# Patient Record
Sex: FEMALE | Race: WHITE | NOT HISPANIC OR LATINO | Employment: OTHER | URBAN - METROPOLITAN AREA
[De-identification: names, ages, dates, MRNs, and addresses within clinical notes are randomized per-mention and may not be internally consistent; named-entity substitution may affect disease eponyms.]

---

## 2021-01-22 ENCOUNTER — OFFICE VISIT (OUTPATIENT)
Dept: OTOLARYNGOLOGY | Facility: CLINIC | Age: 61
End: 2021-01-22

## 2021-01-22 VITALS — WEIGHT: 244.06 LBS | DIASTOLIC BLOOD PRESSURE: 84 MMHG | SYSTOLIC BLOOD PRESSURE: 130 MMHG | HEART RATE: 74 BPM

## 2021-01-22 DIAGNOSIS — S08.112S: Primary | ICD-10-CM

## 2021-01-22 PROCEDURE — 99999 PR PBB SHADOW E&M-NEW PATIENT-LVL II: CPT | Mod: PBBFAC,,, | Performed by: OTOLARYNGOLOGY

## 2021-01-22 PROCEDURE — 99203 PR OFFICE/OUTPT VISIT, NEW, LEVL III, 30-44 MIN: ICD-10-PCS | Mod: S$PBB,,, | Performed by: OTOLARYNGOLOGY

## 2021-01-22 PROCEDURE — 99202 OFFICE O/P NEW SF 15 MIN: CPT | Mod: PBBFAC | Performed by: OTOLARYNGOLOGY

## 2021-01-22 PROCEDURE — 99203 OFFICE O/P NEW LOW 30 MIN: CPT | Mod: S$PBB,,, | Performed by: OTOLARYNGOLOGY

## 2021-01-22 PROCEDURE — 99999 PR PBB SHADOW E&M-NEW PATIENT-LVL II: ICD-10-PCS | Mod: PBBFAC,,, | Performed by: OTOLARYNGOLOGY

## 2023-05-22 ENCOUNTER — TELEPHONE (OUTPATIENT)
Dept: NEUROSURGERY | Facility: CLINIC | Age: 63
End: 2023-05-22
Payer: OTHER GOVERNMENT

## 2023-05-22 NOTE — TELEPHONE ENCOUNTER
Spoke with patient confirmed that we accept her insurance and patient has been scheduled with Dr. Walters. Patient has been informed that she needs [facility].

## 2023-05-22 NOTE — TELEPHONE ENCOUNTER
----- Message from Barb Vaca sent at 5/17/2023 11:58 AM CDT -----  Regarding: Need Call back  Hi, pt was told to contact you about her insurance from billing. Pt has a question because she would like to see Dr Walters. Pls call the pt at

## 2023-05-25 ENCOUNTER — TELEPHONE (OUTPATIENT)
Dept: NEUROSURGERY | Facility: CLINIC | Age: 63
End: 2023-05-25
Payer: OTHER GOVERNMENT

## 2023-05-25 DIAGNOSIS — M54.16 LUMBAR RADICULOPATHY: Primary | ICD-10-CM

## 2023-05-25 NOTE — TELEPHONE ENCOUNTER
Spoke to patient and confirmed r/s time and date for appointment with Anne Marie. She confirmed she will bring Xrays and MRIs on disc to appointment        ----- Message from Daysi Herrmann sent at 5/25/2023  2:04 PM CDT -----  Regarding: PATIENT REQUEST  Name of Who is Calling: RENETTA CHIANG [[id number]]      What is the request in detail: Pt has an appt scheduled for 5/30 and she is requesting to reschedule to 6/1/2023. Please advise!        Can the clinic reply by [facility]: NO        [facility] [phone]

## 2023-06-01 ENCOUNTER — OFFICE VISIT (OUTPATIENT)
Dept: NEUROSURGERY | Facility: CLINIC | Age: 63
End: 2023-06-01
Payer: OTHER GOVERNMENT

## 2023-06-01 ENCOUNTER — HOSPITAL ENCOUNTER (OUTPATIENT)
Dept: RADIOLOGY | Facility: HOSPITAL | Age: 63
Discharge: HOME | End: 2023-06-01
Attending: NURSE PRACTITIONER
Payer: OTHER GOVERNMENT

## 2023-06-01 VITALS — WEIGHT: 235.69 LBS | DIASTOLIC BLOOD PRESSURE: 80 MMHG | HEART RATE: 73 BPM | SYSTOLIC BLOOD PRESSURE: 120 MMHG

## 2023-06-01 DIAGNOSIS — M48.07 SPINAL STENOSIS, LUMBOSACRAL REGION: ICD-10-CM

## 2023-06-01 DIAGNOSIS — M54.16 LUMBAR RADICULOPATHY: ICD-10-CM

## 2023-06-01 DIAGNOSIS — M54.9 DORSALGIA, UNSPECIFIED: Primary | ICD-10-CM

## 2023-06-01 DIAGNOSIS — Z98.890 HX OF LAMINECTOMY: ICD-10-CM

## 2023-06-01 PROCEDURE — 72110 X-RAY EXAM L-2 SPINE 4/>VWS: CPT | Mod: TC

## 2023-06-01 PROCEDURE — 72110 XR LUMBAR SPINE AP AND LAT WITH FLEX/EXT: ICD-10-PCS | Mod: 26,,, | Performed by: RADIOLOGY

## 2023-06-01 PROCEDURE — 99204 PR OFFICE/OUTPT VISIT, NEW, LEVL IV, 45-59 MIN: ICD-10-PCS | Mod: S$PBB,,, | Performed by: NURSE PRACTITIONER

## 2023-06-01 PROCEDURE — 99204 OFFICE O/P NEW MOD 45 MIN: CPT | Mod: S$PBB,,, | Performed by: NURSE PRACTITIONER

## 2023-06-01 PROCEDURE — 72110 X-RAY EXAM L-2 SPINE 4/>VWS: CPT | Mod: 26,,, | Performed by: RADIOLOGY

## 2023-06-01 PROCEDURE — 99999 PR PBB SHADOW E&M-EST. PATIENT-LVL III: ICD-10-PCS | Mod: PBBFAC,,, | Performed by: NURSE PRACTITIONER

## 2023-06-01 PROCEDURE — 99213 OFFICE O/P EST LOW 20 MIN: CPT | Mod: PBBFAC | Performed by: NURSE PRACTITIONER

## 2023-06-01 PROCEDURE — 99999 PR PBB SHADOW E&M-EST. PATIENT-LVL III: CPT | Mod: PBBFAC,,, | Performed by: NURSE PRACTITIONER
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
## 2023-07-31 ENCOUNTER — TELEPHONE (OUTPATIENT)
Dept: NEUROSURGERY | Facility: CLINIC | Age: 63
End: 2023-07-31
Payer: OTHER GOVERNMENT

## 2023-07-31 NOTE — TELEPHONE ENCOUNTER
Spoke to patient and confirmed rescheduled date and time for imaging and follow up appointment with Dr. Walters          ----- Message from Radha Patel sent at 7/31/2023  3:51 PM CDT -----  Type:  Appointment Request    Name of Caller:RENETTA CHIANG [[id number]]  When is the first available appointment?No access  Symptoms: reschedule   Would the patient rather a call back or a response via [facility]? Call   Best Call Back Number:[phone]  Additional Information: Patient indicates she needs to reschedule all her appointments for 8/1/23. Patient indicates she will not be able to make the appointment for 8/1/23. Patient indicates she would like to schedule for the next appointment available what the provider. Please call patient back with further assistance in scheduling labs and follow up appointment. Please call back with further information.

## 2023-08-11 DIAGNOSIS — M25.552 BILATERAL HIP PAIN: Primary | ICD-10-CM

## 2023-08-11 DIAGNOSIS — M25.551 BILATERAL HIP PAIN: Primary | ICD-10-CM

## 2023-08-14 ENCOUNTER — HOSPITAL ENCOUNTER (OUTPATIENT)
Dept: RADIOLOGY | Facility: HOSPITAL | Age: 63
Discharge: HOME | End: 2023-08-14
Attending: ORTHOPAEDIC SURGERY
Payer: OTHER GOVERNMENT

## 2023-08-14 ENCOUNTER — TELEPHONE (OUTPATIENT)
Dept: ORTHOPEDICS | Facility: CLINIC | Age: 63
End: 2023-08-14

## 2023-08-14 ENCOUNTER — OFFICE VISIT (OUTPATIENT)
Dept: ORTHOPEDICS | Facility: CLINIC | Age: 63
End: 2023-08-14
Payer: OTHER GOVERNMENT

## 2023-08-14 VITALS — BODY MASS INDEX: 36.89 KG/M2 | HEIGHT: 68 IN | WEIGHT: 243.38 LBS

## 2023-08-14 DIAGNOSIS — Z96.642 PRESENCE OF LEFT ARTIFICIAL HIP JOINT: ICD-10-CM

## 2023-08-14 DIAGNOSIS — M25.552 PAIN OF LEFT HIP: Primary | ICD-10-CM

## 2023-08-14 DIAGNOSIS — Z96.642 STATUS POST LEFT HIP REPLACEMENT: ICD-10-CM

## 2023-08-14 DIAGNOSIS — M25.552 BILATERAL HIP PAIN: ICD-10-CM

## 2023-08-14 DIAGNOSIS — M25.551 BILATERAL HIP PAIN: ICD-10-CM

## 2023-08-14 PROCEDURE — 99213 OFFICE O/P EST LOW 20 MIN: CPT | Mod: PBBFAC | Performed by: ORTHOPAEDIC SURGERY

## 2023-08-14 PROCEDURE — 73521 XR HIPS BILATERAL 2 VIEW INCL AP PELVIS: ICD-10-PCS | Mod: 26,,, | Performed by: RADIOLOGY

## 2023-08-14 PROCEDURE — 99204 OFFICE O/P NEW MOD 45 MIN: CPT | Mod: S$PBB,,, | Performed by: ORTHOPAEDIC SURGERY

## 2023-08-14 PROCEDURE — 99999 PR PBB SHADOW E&M-EST. PATIENT-LVL III: ICD-10-PCS | Mod: PBBFAC,,, | Performed by: ORTHOPAEDIC SURGERY

## 2023-08-14 PROCEDURE — 73521 X-RAY EXAM HIPS BI 2 VIEWS: CPT | Mod: 26,,, | Performed by: RADIOLOGY

## 2023-08-14 PROCEDURE — 73521 X-RAY EXAM HIPS BI 2 VIEWS: CPT | Mod: TC

## 2023-08-14 PROCEDURE — 99999 PR PBB SHADOW E&M-EST. PATIENT-LVL III: CPT | Mod: PBBFAC,,, | Performed by: ORTHOPAEDIC SURGERY

## 2023-08-14 PROCEDURE — 99204 PR OFFICE/OUTPT VISIT, NEW, LEVL IV, 45-59 MIN: ICD-10-PCS | Mod: S$PBB,,, | Performed by: ORTHOPAEDIC SURGERY

## 2023-08-14 RX ORDER — LEVETIRACETAM 750 MG/1
500 TABLET ORAL 2 TIMES DAILY
COMMUNITY
End: 2024-02-08

## 2023-08-14 RX ORDER — BACLOFEN 10 MG/1
10 TABLET ORAL 3 TIMES DAILY
COMMUNITY

## 2023-08-14 RX ORDER — PREGABALIN 150 MG/1
150 CAPSULE ORAL 2 TIMES DAILY
COMMUNITY

## 2023-08-14 RX ORDER — VENLAFAXINE HYDROCHLORIDE 75 MG/1
75 CAPSULE, EXTENDED RELEASE ORAL DAILY
Status: ON HOLD | COMMUNITY
End: 2023-10-24 | Stop reason: HOSPADM

## 2023-08-14 RX ORDER — PANTOPRAZOLE SODIUM 20 MG/1
20 TABLET, DELAYED RELEASE ORAL DAILY
COMMUNITY

## 2023-08-14 RX ORDER — FERROUS SULFATE, DRIED 160(50) MG
1 TABLET, EXTENDED RELEASE ORAL 2 TIMES DAILY WITH MEALS
Status: ON HOLD | COMMUNITY
End: 2023-10-24 | Stop reason: HOSPADM

## 2023-08-14 RX ORDER — MELATONIN 10 MG
CAPSULE ORAL NIGHTLY
COMMUNITY

## 2023-08-14 RX ORDER — CARBOXYMETHYLCELLULOSE SODIUM 5 MG/ML
1 SOLUTION/ DROPS OPHTHALMIC 3 TIMES DAILY PRN
COMMUNITY

## 2023-08-14 RX ORDER — FERROUS GLUCONATE 324(38)MG
324 TABLET ORAL
COMMUNITY

## 2023-08-14 RX ORDER — IBUPROFEN 200 MG
200 TABLET ORAL EVERY 6 HOURS PRN
Status: ON HOLD | COMMUNITY
End: 2023-10-24 | Stop reason: HOSPADM

## 2023-08-14 RX ORDER — LEVOCETIRIZINE DIHYDROCHLORIDE 5 MG/1
5 TABLET, FILM COATED ORAL NIGHTLY
COMMUNITY

## 2023-08-14 RX ORDER — UBIDECARENONE 75 MG
500 CAPSULE ORAL DAILY
Status: ON HOLD | COMMUNITY
End: 2023-10-24 | Stop reason: HOSPADM

## 2023-08-14 RX ORDER — ZINC GLUCONATE 50 MG
50 TABLET ORAL DAILY
Status: ON HOLD | COMMUNITY
End: 2023-10-24 | Stop reason: HOSPADM

## 2023-08-14 RX ORDER — LANOLIN ALCOHOL/MO/W.PET/CERES
400 CREAM (GRAM) TOPICAL DAILY
COMMUNITY

## 2023-08-14 RX ORDER — ERGOCALCIFEROL 1.25 MG/1
50000 CAPSULE ORAL
Status: ON HOLD | COMMUNITY
End: 2023-10-24 | Stop reason: HOSPADM

## 2023-08-14 NOTE — TELEPHONE ENCOUNTER
Called patient and results given. Patient verbalized understanding.    ----- Message from João Mckeon MD sent at 8/14/2023  3:44 PM CDT -----  Please call pt.  No infection.  We will call her after her MRI

## 2023-08-14 NOTE — PROGRESS NOTES
Subjective:      Patient ID: Fior Dc is a 62 y.o. female.    Chief Complaint: Pain of the Left Hip    HPI  Fior Dc is a 62 year old female here with a 5 year history of left hip pain.  Bilateral hip replacements in [facility].  The right was in 2018 the left was in 2016.  She initially did well with the left hip started having problems a few years later.  She also has significant lumbar disease for which he is being treated.  She has an upcoming appointment with Dr. Walters for that.  The patient is on disability. She fell three weeks ago.   The pain is moderate The pain is located in the lateral.  There is is radiation.  The pain radaites to the lateral thigh.  The pain is described as throbbing. It is aggravated by sitting, standing, and walking.  It  is alleviated by rest. There is numbness or tingling of the lower extremity.  There is back pain.  She  has not tried medications or injections recently.   She does have difficulty getting in or out of a car, getting dressed, or going up or down stairs.  The patient does use an assistive device.    Past Medical History:   Diagnosis Date    Arthritis     Bulging lumbar disc     Cancer     Osteoarthritis      Past Surgical History:   Procedure Laterality Date    ABDOMINOPLASTY      APPENDECTOMY      BACK SURGERY      breast aug Bilateral     CHOLECYSTECTOMY      FOOT SURGERY      GANGLION CYST EXCISION      GASTRIC BYPASS      HIP SURGERY      HYSTERECTOMY      KNEE SURGERY      SHOULDER SURGERY       Family History   Problem Relation Age of Onset    Hypertension Mother     Hypertension Father     Diabetes Father     Cancer Father      Social History     Socioeconomic History    Marital status: Single   Tobacco Use    Smoking status: Every Day     Current packs/day: 0.50     Average packs/day: 0.5 packs/day for 0.3 years (0.1 ttl pk-yrs)     Types: Vaping with nicotine, Cigarettes     Start date: 4/1/2023    Smokeless tobacco:  Never   Substance and Sexual Activity    Alcohol use: Yes    Drug use: Never     Current Outpatient Medications on File Prior to Visit   Medication Sig Dispense Refill    baclofen (LIORESAL) 10 MG tablet Take 10 mg by mouth 3 (three) times daily.      calcium-vitamin D3 (CALCIUM 500 + D) 500 mg-5 mcg (200 unit) per tablet Take 1 tablet by mouth 2 (two) times daily with meals.      carboxymethylcellulose (REFRESH PLUS) 0.5 % Dpet 1 drop 3 (three) times daily as needed.      cyanocobalamin 500 MCG tablet Take 500 mcg by mouth once daily.      ergocalciferol (VITAMIN D2) 50,000 unit Cap Take 50,000 Units by mouth every 7 days.      ferrous gluconate (FERGON) 324 MG tablet Take 324 mg by mouth daily with breakfast.      ibuprofen (ADVIL,MOTRIN) 200 MG tablet Take 200 mg by mouth every 6 (six) hours as needed for Pain.      levETIRAcetam (KEPPRA) 750 MG Tab Take 500 mg by mouth 2 (two) times daily.      levocetirizine (XYZAL) 5 MG tablet Take 5 mg by mouth every evening.      magnesium oxide (MAG-OX) 400 mg (241.3 mg magnesium) tablet Take 400 mg by mouth once daily.      melatonin 10 mg Cap Take by mouth.      pantoprazole (PROTONIX) 20 MG tablet Take 20 mg by mouth once daily.      pregabalin (LYRICA) 150 MG capsule Take 150 mg by mouth 2 (two) times daily.      venlafaxine (EFFEXOR-XR) 75 MG 24 hr capsule Take 75 mg by mouth once daily.      zinc gluconate 50 mg tablet Take 50 mg by mouth once daily.       No current facility-administered medications on file prior to visit.     Review of patient's allergies indicates:   Allergen Reactions    Adhesive tape-silicones     Codeine     Flunisolide     Gabapentin     Hydrocodone     Oxycodone        Review of Systems   Constitutional: Negative for chills, fever and night sweats.   HENT:  Negative for hearing loss.    Eyes:  Negative for blurred vision and double vision.   Cardiovascular:  Negative for chest pain, claudication and leg swelling.   Respiratory:  Negative for  "shortness of breath.    Endocrine: Negative for polydipsia, polyphagia and polyuria.   Hematologic/Lymphatic: Negative for adenopathy and bleeding problem. Does not bruise/bleed easily.   Skin:  Negative for poor wound healing.   Gastrointestinal:  Negative for diarrhea and heartburn.   Genitourinary:  Negative for bladder incontinence.   Neurological:  Negative for focal weakness, headaches, numbness, paresthesias and sensory change.   Psychiatric/Behavioral:  The patient is not nervous/anxious.    Allergic/Immunologic: Negative for persistent infections.         Objective:      Body mass index is 37.01 kg/m².  Vitals:    08/14/23 1332   Weight: 110.4 kg (243 lb 6.2 oz)   Height: 5' 8" (1.727 m)         General    Constitutional: She is oriented to person, place, and time. She appears well-developed and well-nourished.   HENT:   Head: Normocephalic and atraumatic.   Eyes: EOM are normal.   Cardiovascular:  Normal rate.            Pulmonary/Chest: Effort normal.   Neurological: She is alert and oriented to person, place, and time.   Psychiatric: She has a normal mood and affect. Her behavior is normal.     General Musculoskeletal Exam   Gait: normal   Pelvic Obliquity: none      Right Knee Exam     Inspection   Alignment:  normal  Effusion: absent    Left Knee Exam     Inspection   Alignment:  normal  Effusion: absent    Right Hip Exam     Inspection   Scars: present (anterior)  Swelling: absent  Bruising: absent  No deformity of hip.  Quadriceps Atrophy:  Negative  Erythema: absent    Range of Motion   Abduction:  25   Adduction:  20   Extension:  0   Flexion:  100   External rotation:  30   Internal rotation:  25     Tests   Pain w/ forced internal rotation (GARTH): absent  Stinchfield test: negative    Other   Sensation: normal  Left Hip Exam     Inspection   Scars: present (anterior)  Swelling: absent  No deformity of hip.  Quadriceps Atrophy:  negative  Erythema: absent  Bruising: absent    Tenderness   The " patient tender to palpation of the psoas tendon.    Range of Motion   Abduction:  25   Adduction:  20   Extension:  0   Flexion:  100   External rotation:  30   Internal rotation: 25     Tests   Pain w/ forced internal rotation (GARTH): absent  Stinchfield test: negative    Other   Sensation: normal      Back (L-Spine & T-Spine) / Neck (C-Spine) Exam   Back exam is normal.      Muscle Strength   Right Lower Extremity   Hip Abduction: 5/5   Hip Adduction: 5/5   Hip Flexion: 5/5   Ankle Dorsiflexion:  5/5   Left Lower Extremity   Hip Abduction: 5/5   Hip Adduction: 5/5   Hip Flexion: 5/5   Ankle Dorsiflexion:  5/5     Reflexes     Left Side  Quadriceps:  2+    Right Side   Quadriceps:  2+    Vascular Exam     Right Pulses  Dorsalis Pedis:      2+          Left Pulses  Dorsalis Pedis:      2+          Capillary Refill  Right Hand: normal capillary refill  Left Hand: normal capillary refill        Edema  Right Upper Leg: absent  Left Upper Leg: absent      Radiographs taken today were reviewed by me.  There is a prosthetic replacement of the bilateral hip(s).  The prostheses is well positioned.  There is not evidence of bone loss, osteolysis, or loosening. There is not a fracture.            Assessment:       Encounter Diagnoses   Name Primary?    Pain of left hip Yes    Status post left hip replacement     Presence of left artificial hip joint           Plan:       Fior was seen today for pain.    Diagnoses and all orders for this visit:    Pain of left hip  -     C-Reactive Protein; Future  -     Sedimentation rate; Future    Status post left hip replacement  -     C-Reactive Protein; Future  -     Sedimentation rate; Future    Presence of left artificial hip joint  -     C-Reactive Protein; Future  -     Sedimentation rate; Future  -     MRI Hip Without Contrast Left; Future        Options discussed.  ESR/CRP.  MARS MRI LEFT HIP

## 2023-08-28 ENCOUNTER — TELEPHONE (OUTPATIENT)
Dept: NEUROSURGERY | Facility: CLINIC | Age: 63
End: 2023-08-28
Payer: OTHER GOVERNMENT

## 2023-08-28 NOTE — TELEPHONE ENCOUNTER
----- Message from Patricia Daley sent at 8/28/2023  8:26 AM CDT -----  Regarding: Pt called to cancel/reschedule her 8/29/23 appts and would like a call back to r/s  Appointment Access Request:    Pt called to cancel/reschedule her 8/29/23 appts and would like a call back to r/s    Pt can be reached at [phone]

## 2023-09-19 ENCOUNTER — OFFICE VISIT (OUTPATIENT)
Dept: NEUROSURGERY | Facility: CLINIC | Age: 63
End: 2023-09-19
Payer: OTHER GOVERNMENT

## 2023-09-19 DIAGNOSIS — Z98.890 HX OF LAMINECTOMY: ICD-10-CM

## 2023-09-19 DIAGNOSIS — M54.16 LUMBAR RADICULOPATHY: Primary | ICD-10-CM

## 2023-09-19 PROCEDURE — 99214 PR OFFICE/OUTPT VISIT, EST, LEVL IV, 30-39 MIN: ICD-10-PCS | Mod: 95,,, | Performed by: NEUROLOGICAL SURGERY

## 2023-09-19 PROCEDURE — 99214 OFFICE O/P EST MOD 30 MIN: CPT | Mod: 95,,, | Performed by: NEUROLOGICAL SURGERY

## 2023-09-28 DIAGNOSIS — M48.061 SPINAL STENOSIS OF LUMBAR REGION, UNSPECIFIED WHETHER NEUROGENIC CLAUDICATION PRESENT: Primary | ICD-10-CM

## 2023-09-28 DIAGNOSIS — M48.07 SPINAL STENOSIS, LUMBOSACRAL REGION: ICD-10-CM
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
## 2023-10-03 ENCOUNTER — PATIENT MESSAGE (OUTPATIENT)
Dept: NEUROSURGERY | Facility: CLINIC | Age: 63
End: 2023-10-03
Payer: OTHER GOVERNMENT

## 2023-10-04 ENCOUNTER — TELEPHONE (OUTPATIENT)
Dept: PREADMISSION TESTING | Facility: HOSPITAL | Age: 63
End: 2023-10-04
Payer: OTHER GOVERNMENT

## 2023-10-04 DIAGNOSIS — Z01.818 PREOPERATIVE TESTING: Primary | ICD-10-CM

## 2023-10-04 NOTE — PRE-PROCEDURE INSTRUCTIONS
Patient stated has not had any problem with anesthesia in the past. Will need labs and ua.  Our  will call to set up these appts.    Preop instructions given. Hold aspirin, aspirin containing products, nsaids(aleve, advil, motrin, ibuprofen, naprosyn, naproxen, voltaren, diclofenac), vitamins( Calcium with Vitamin D, cyanocobalamin, D2) and supplements( Collagen, Zinc) one week prior to surgery.     May take Tylenol.  Medication instructions given:  baclofen (LIORESAL) 10 MG tablet -- --  --   Sig: Take 10 mg by mouth 3 (three) times daily.   Class: Historical Med   Route: Oral       Baylee Cerrato RN 10/4/2023 10:02 AM   TAKE IN AM OF SURGERY.              calcium-vitamin D3 (CALCIUM 500 + D) 500 mg-5 mcg (200 unit) per tablet -- --  --   Sig: Take 1 tablet by mouth 2 (two) times daily with meals.   Class: Historical Med   Route: Baylee Waters RN 10/4/2023 10:01 AM   HOLD ONE WEEK PRIOR TO SURGERY.             carboxymethylcellulose (REFRESH PLUS) 0.5 % Dpet -- --  --   Si drop 3 (three) times daily as needed.   Class: Historical Med       Baylee Cerrato RN 10/4/2023 10:03 AM   TAKE IF NEEDED            collagen/biotin/ascorbic acid (COLLAGEN 1500 PLUS C ORAL) -- --  --   Sig: Take by mouth Daily.   Class: Historical Med   Route: Baylee Waters RN 10/4/2023 10:07 AM   HOLD ONE WEEK PRIOR TO SURGERY.             cyanocobalamin 500 MCG tablet -- --  --   Sig: Take 500 mcg by mouth once daily.   Class: Historical Med   Route: Oral       Baylee Cerrato RN 10/4/2023 10:01 AM   HOLD ONE WEEK PRIOR TO SURGERY.             ergocalciferol (VITAMIN D2) 50,000 unit Cap -- --  --   Sig: Take 50,000 Units by mouth every 7 days.   Class: Historical Med   Route: Baylee Waters RN 10/4/2023 10:01 AM   HOLD ONE WEEK PRIOR TO SURGERY.             ferrous gluconate (FERGON) 324 MG tablet -- --  --   Sig: Take 324 mg by mouth daily with breakfast.   Class: Historical Med   Route: Oral        Baylee Cerrato RN 10/4/2023 10:03 AM   HOLD IN AM OF SURGERY.             ibuprofen (ADVIL,MOTRIN) 200 MG tablet -- --  --   Sig: Take 200 mg by mouth every 6 (six) hours as needed for Pain.   Class: Historical Med   Route: Baylee Waters RN 10/4/2023 10:01 AM   HOLD ONE WEEK PRIOR TO SURGERY.             levETIRAcetam (KEPPRA) 750 MG Tab -- --  --   Sig: Take 500 mg by mouth 2 (two) times daily.   Class: Historical Med   Route: Baylee Waters RN 10/4/2023 10:03 AM   TAKE IN AM OF SURGERY.              levocetirizine (XYZAL) 5 MG tablet -- --  --   Sig: Take 5 mg by mouth every evening.   Class: Historical Med   Route: Baylee Waters RN 10/4/2023 10:03 AM   TAKE THE NIGHT BEFORE SURGERY.              magnesium oxide (MAG-OX) 400 mg (241.3 mg magnesium) tablet -- --  --   Sig: Take 400 mg by mouth once daily.   Class: Historical Med   Route: Baylee Waters RN 10/4/2023 10:02 AM   HOLD IN AM OF SURGERY.             melatonin 10 mg Cap -- --  --   Sig: Take by mouth every evening.   Class: Historical Med   Route: Baylee Waters RN 10/4/2023 10:04 AM   TAKE THE NIGHT BEFORE SURGERY.              pantoprazole (PROTONIX) 20 MG tablet -- --  --   Sig: Take 20 mg by mouth once daily.   Class: Historical Med   Route: Baylee Waters RN 10/4/2023 10:04 AM   TAKE IN AM OF SURGERY.              pregabalin (LYRICA) 150 MG capsule -- --  --   Sig: Take 150 mg by mouth 2 (two) times daily.   Class: Historical Med   Route: Baylee Waters RN 10/4/2023 10:04 AM   TAKE IN AM OF SURGERY.              venlafaxine (EFFEXOR-XR) 75 MG 24 hr capsule -- --  --   Sig: Take 75 mg by mouth once daily.   Class: Historical Med   Route: Baylee Waters RN 10/4/2023 10:04 AM   TAKE IN AM OF SURGERY.              zinc gluconate 50 mg tablet -- --  --   Sig: Take 50 mg by mouth once daily.   Class: Historical Med   Route: Oral       Baylee Cerrato RN 10/4/2023 10:02 AM   HOLD  ONE WEEK PRIOR TO SURGERY            Your surgery has been scheduled for:_Monday 10/23/2023_________________________________________  [facility] ( [facility]) [phone]  You should report to:  ____[facility], located on the [place] side of the first floor of the           [facility] ([phone])  _x___The Second Floor [facility] Center, located on the [facility] side of the            Second floor of the [facility] ([phone])  ____3rd Floor [facility] located on the [facility] side of the [facility] [phone]  Please Note   Tell your doctor if you take Aspirin, products containing Aspirin, herbal medications  or blood thinners, such as Coumadin, Ticlid, or Plavix.  (Consult your provider regarding holding or stopping before surgery).  Arrange for someone to drive you home following surgery.  You will not be allowed to leave the surgical facility alone or drive yourself home following sedation and anesthesia.  Before Surgery  Stop taking all vitamins/ herbal medications 14days prior to surgery  No Motrin/Advil (Ibuprofen) 7 days before surgery  No Aleve (Naproxen) 7 days before surgery  Stop Taking Asprin, products containing Asprin _7____days before surgery  Stop taking blood thinners_______days before surgery  No Goody's/BC  Powder 7 days before surgery  Refrain from drinking alcoholic beverages for 24hours before and after surgery  Stop or limit smoking / vaping____7_____days before surgery. No vaping the day of surgery.  You may take Tylenol for pain  Night before Surgery  Nothing to eat or drink after midnight.  Take a shower or bath (shower is recommended).  Bathe with Hibiclens soap or an antibacterial soap from the neck down.  If not supplied by your surgeon, hibiclens soap will need to be purchased over the counter in pharmacy.  Rinse soap off thoroughly.  Shampoo your hair with your regular shampoo  The Day of  Surgery  NOTHING TO  DRINK 2 hours before arrival time. If you are told to take medication on the morning of surgery, it may be taken with a sip of water.   Take another bath or shower with hibiclens or any antibacterial soap, to reduce the chance of infection.  Take heart and blood pressure medications with a small sip of water, as advised by the perioperative team.  Do not take fluid pills  You may brush your teeth and rinse your mouth, but do not swall any additional water.   Do not apply perfumes, powder, body lotions or deodorant on the day of surgery.  Nail polish should be removed.  Do not wear makeup or moisturizer  Wear comfortable clothes, such as a button front shirt and loose fitting pants.  Leave all jewelry, including body piercings, and valuables at home.    Bring any devices you will neeed after surgery such as crutches or canes.  If you have sleep apnea, please bring your CPAP machine  In the event that your physical condition changes including the onset of a cold or respiratory illness, or if you have to delay or cancel your surgery, please notify your surgeon.    Directions given to the surgery center. Verbalizes understanding. Also sent preop and med instructions to [facility] portal.
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
## 2023-10-04 NOTE — TELEPHONE ENCOUNTER
----- Message from Baylee Cerrato RN sent at 10/4/2023 10:32 AM CDT -----  Surgery 10/23  Please schedule labs and ua.  Thanks!

## 2023-10-09 ENCOUNTER — LAB VISIT (OUTPATIENT)
Dept: LAB | Facility: HOSPITAL | Age: 63
End: 2023-10-09
Attending: ANESTHESIOLOGY
Payer: OTHER GOVERNMENT

## 2023-10-09 DIAGNOSIS — Z01.818 PREOPERATIVE TESTING: ICD-10-CM

## 2023-10-09 LAB
ABO + RH BLD: NORMAL
ANION GAP SERPL CALC-SCNC: 10 MMOL/L (ref 8–16)
APTT PPP: 25.9 SEC (ref 21–32)
BLD GP AB SCN CELLS X3 SERPL QL: NORMAL
BUN SERPL-MCNC: 11 MG/DL (ref 8–23)
CALCIUM SERPL-MCNC: 9.4 MG/DL (ref 8.7–10.5)
CHLORIDE SERPL-SCNC: 103 MMOL/L (ref 95–110)
CO2 SERPL-SCNC: 25 MMOL/L (ref 23–29)
CREAT SERPL-MCNC: 0.8 MG/DL (ref 0.5–1.4)
ERYTHROCYTE [DISTWIDTH] IN BLOOD BY AUTOMATED COUNT: 14.7 % (ref 11.5–14.5)
EST. GFR  (NO RACE VARIABLE): >60 ML/MIN/1.73 M^2
GLUCOSE SERPL-MCNC: 81 MG/DL (ref 70–110)
HCT VFR BLD AUTO: 42.7 % (ref 37–48.5)
HGB BLD-MCNC: 13.4 G/DL (ref 12–16)
INR PPP: 1 (ref 0.8–1.2)
MCH RBC QN AUTO: 29.5 PG (ref 27–31)
MCHC RBC AUTO-ENTMCNC: 31.4 G/DL (ref 32–36)
MCV RBC AUTO: 94 FL (ref 82–98)
PLATELET # BLD AUTO: 250 K/UL (ref 150–450)
PMV BLD AUTO: 11.8 FL (ref 9.2–12.9)
POTASSIUM SERPL-SCNC: 4.4 MMOL/L (ref 3.5–5.1)
PROTHROMBIN TIME: 10.3 SEC (ref 9–12.5)
RBC # BLD AUTO: 4.54 M/UL (ref 4–5.4)
SODIUM SERPL-SCNC: 138 MMOL/L (ref 136–145)
SPECIMEN OUTDATE: NORMAL
WBC # BLD AUTO: 20.94 K/UL (ref 3.9–12.7)

## 2023-10-09 PROCEDURE — 86900 BLOOD TYPING SEROLOGIC ABO: CPT | Performed by: ANESTHESIOLOGY

## 2023-10-09 PROCEDURE — 85730 THROMBOPLASTIN TIME PARTIAL: CPT | Performed by: ANESTHESIOLOGY

## 2023-10-09 PROCEDURE — 80048 BASIC METABOLIC PNL TOTAL CA: CPT | Performed by: ANESTHESIOLOGY

## 2023-10-09 PROCEDURE — 85610 PROTHROMBIN TIME: CPT | Performed by: ANESTHESIOLOGY

## 2023-10-09 PROCEDURE — 36415 COLL VENOUS BLD VENIPUNCTURE: CPT | Performed by: ANESTHESIOLOGY

## 2023-10-09 PROCEDURE — 85027 COMPLETE CBC AUTOMATED: CPT | Performed by: ANESTHESIOLOGY

## 2023-10-18 ENCOUNTER — TELEPHONE (OUTPATIENT)
Dept: INTERVENTIONAL RADIOLOGY/VASCULAR | Facility: CLINIC | Age: 63
End: 2023-10-18
Payer: OTHER GOVERNMENT

## 2023-10-19 ENCOUNTER — PATIENT MESSAGE (OUTPATIENT)
Dept: NEUROSURGERY | Facility: CLINIC | Age: 63
End: 2023-10-19
Payer: OTHER GOVERNMENT

## 2023-10-20 ENCOUNTER — TELEPHONE (OUTPATIENT)
Dept: NEUROSURGERY | Facility: CLINIC | Age: 63
End: 2023-10-20
Payer: OTHER GOVERNMENT

## 2023-10-20 NOTE — TELEPHONE ENCOUNTER
Spoke to pt regarding message requesting call back.  She stated she had a question, but has since figured it out.  We confirmed time and location for sx arrival on 10/23/23

## 2023-10-21 ENCOUNTER — ANESTHESIA EVENT (OUTPATIENT)
Dept: SURGERY | Facility: HOSPITAL | Age: 63
DRG: 519 | End: 2023-10-21
Payer: OTHER GOVERNMENT

## 2023-10-23 ENCOUNTER — ANESTHESIA (OUTPATIENT)
Dept: SURGERY | Facility: HOSPITAL | Age: 63
DRG: 519 | End: 2023-10-23
Payer: OTHER GOVERNMENT

## 2023-10-23 ENCOUNTER — HOSPITAL ENCOUNTER (INPATIENT)
Facility: HOSPITAL | Age: 63
LOS: 1 days | Discharge: HOME | DRG: 519 | End: 2023-10-24
Attending: NEUROLOGICAL SURGERY | Admitting: NEUROLOGICAL SURGERY
Payer: OTHER GOVERNMENT

## 2023-10-23 VITALS — RESPIRATION RATE: 16 BRPM

## 2023-10-23 DIAGNOSIS — M48.061 LUMBAR STENOSIS: ICD-10-CM

## 2023-10-23 PROBLEM — G40.909 SEIZURE DISORDER: Chronic | Status: ACTIVE | Noted: 2023-10-23

## 2023-10-23 PROBLEM — M51.36 DDD (DEGENERATIVE DISC DISEASE), LUMBAR: Status: ACTIVE | Noted: 2023-10-23

## 2023-10-23 PROBLEM — C91.10 CLL (CHRONIC LYMPHOCYTIC LEUKEMIA): Status: ACTIVE | Noted: 2023-10-23

## 2023-10-23 PROBLEM — F32.A DEPRESSION: Status: ACTIVE | Noted: 2023-10-23

## 2023-10-23 PROBLEM — C91.10 CLL (CHRONIC LYMPHOCYTIC LEUKEMIA): Chronic | Status: ACTIVE | Noted: 2023-10-23

## 2023-10-23 PROBLEM — F32.A DEPRESSION: Chronic | Status: ACTIVE | Noted: 2023-10-23

## 2023-10-23 PROBLEM — E66.09 CLASS 2 OBESITY DUE TO EXCESS CALORIES WITH BODY MASS INDEX (BMI) OF 37.0 TO 37.9 IN ADULT: Status: ACTIVE | Noted: 2023-10-23

## 2023-10-23 PROBLEM — E66.812 CLASS 2 OBESITY DUE TO EXCESS CALORIES WITH BODY MASS INDEX (BMI) OF 37.0 TO 37.9 IN ADULT: Status: ACTIVE | Noted: 2023-10-23

## 2023-10-23 PROBLEM — G40.909 SEIZURE DISORDER: Status: ACTIVE | Noted: 2023-10-23

## 2023-10-23 PROBLEM — M51.369 DDD (DEGENERATIVE DISC DISEASE), LUMBAR: Status: ACTIVE | Noted: 2023-10-23

## 2023-10-23 LAB
ABO + RH BLD: NORMAL
BLD GP AB SCN CELLS X3 SERPL QL: NORMAL
SPECIMEN OUTDATE: NORMAL

## 2023-10-23 PROCEDURE — 94761 N-INVAS EAR/PLS OXIMETRY MLT: CPT

## 2023-10-23 PROCEDURE — 71000015 HC POSTOP RECOV 1ST HR: Performed by: NEUROLOGICAL SURGERY

## 2023-10-23 PROCEDURE — 37000009 HC ANESTHESIA EA ADD 15 MINS: Performed by: NEUROLOGICAL SURGERY

## 2023-10-23 PROCEDURE — 25000003 PHARM REV CODE 250: Performed by: NEUROLOGICAL SURGERY

## 2023-10-23 PROCEDURE — 27201423 OPTIME MED/SURG SUP & DEVICES STERILE SUPPLY: Performed by: NEUROLOGICAL SURGERY

## 2023-10-23 PROCEDURE — 25000003 PHARM REV CODE 250: Performed by: NURSE ANESTHETIST, CERTIFIED REGISTERED

## 2023-10-23 PROCEDURE — D9220A PRA ANESTHESIA: Mod: ANES,,, | Performed by: SURGERY

## 2023-10-23 PROCEDURE — 86901 BLOOD TYPING SEROLOGIC RH(D): CPT | Performed by: NEUROLOGICAL SURGERY

## 2023-10-23 PROCEDURE — 37000008 HC ANESTHESIA 1ST 15 MINUTES: Performed by: NEUROLOGICAL SURGERY

## 2023-10-23 PROCEDURE — 63600175 PHARM REV CODE 636 W HCPCS: Performed by: NEUROLOGICAL SURGERY

## 2023-10-23 PROCEDURE — D9220A PRA ANESTHESIA: ICD-10-PCS | Mod: ANES,,, | Performed by: SURGERY

## 2023-10-23 PROCEDURE — 27000221 HC OXYGEN, UP TO 24 HOURS

## 2023-10-23 PROCEDURE — 36000710: Performed by: NEUROLOGICAL SURGERY

## 2023-10-23 PROCEDURE — 71000033 HC RECOVERY, INTIAL HOUR: Performed by: NEUROLOGICAL SURGERY

## 2023-10-23 PROCEDURE — 63047 LAM FACETEC & FORAMOT LUMBAR: CPT | Mod: 22,,, | Performed by: NEUROLOGICAL SURGERY

## 2023-10-23 PROCEDURE — 63600175 PHARM REV CODE 636 W HCPCS: Performed by: STUDENT IN AN ORGANIZED HEALTH CARE EDUCATION/TRAINING PROGRAM

## 2023-10-23 PROCEDURE — 11000001 HC ACUTE MED/SURG PRIVATE ROOM

## 2023-10-23 PROCEDURE — D9220A PRA ANESTHESIA: Mod: CRNA,,, | Performed by: NURSE ANESTHETIST, CERTIFIED REGISTERED

## 2023-10-23 PROCEDURE — 63600175 PHARM REV CODE 636 W HCPCS: Performed by: NURSE ANESTHETIST, CERTIFIED REGISTERED

## 2023-10-23 PROCEDURE — D9220A PRA ANESTHESIA: ICD-10-PCS | Mod: CRNA,,, | Performed by: NURSE ANESTHETIST, CERTIFIED REGISTERED

## 2023-10-23 PROCEDURE — 36000711: Performed by: NEUROLOGICAL SURGERY

## 2023-10-23 PROCEDURE — 25000003 PHARM REV CODE 250: Performed by: STUDENT IN AN ORGANIZED HEALTH CARE EDUCATION/TRAINING PROGRAM

## 2023-10-23 PROCEDURE — 63047 PR LAMINEC/FACETECT/FORAMIN,LUMBAR 1 SEG: ICD-10-PCS | Mod: 22,,, | Performed by: NEUROLOGICAL SURGERY

## 2023-10-23 PROCEDURE — 99900035 HC TECH TIME PER 15 MIN (STAT)

## 2023-10-23 RX ORDER — ENOXAPARIN SODIUM 100 MG/ML
40 INJECTION SUBCUTANEOUS EVERY 24 HOURS
Status: DISCONTINUED | OUTPATIENT
Start: 2023-10-24 | End: 2023-10-24 | Stop reason: HOSPADM

## 2023-10-23 RX ORDER — MUPIROCIN 20 MG/G
1 OINTMENT TOPICAL 2 TIMES DAILY
Status: DISCONTINUED | OUTPATIENT
Start: 2023-10-23 | End: 2023-10-23 | Stop reason: HOSPADM

## 2023-10-23 RX ORDER — DEXAMETHASONE SODIUM PHOSPHATE 4 MG/ML
INJECTION, SOLUTION INTRA-ARTICULAR; INTRALESIONAL; INTRAMUSCULAR; INTRAVENOUS; SOFT TISSUE
Status: DISCONTINUED | OUTPATIENT
Start: 2023-10-23 | End: 2023-10-23

## 2023-10-23 RX ORDER — PROPOFOL 10 MG/ML
VIAL (ML) INTRAVENOUS
Status: DISCONTINUED | OUTPATIENT
Start: 2023-10-23 | End: 2023-10-23

## 2023-10-23 RX ORDER — IBUPROFEN 200 MG
16 TABLET ORAL
Status: DISCONTINUED | OUTPATIENT
Start: 2023-10-23 | End: 2023-10-24 | Stop reason: HOSPADM

## 2023-10-23 RX ORDER — DIPHENHYDRAMINE HYDROCHLORIDE 50 MG/ML
12.5 INJECTION INTRAMUSCULAR; INTRAVENOUS EVERY 4 HOURS PRN
Status: DISCONTINUED | OUTPATIENT
Start: 2023-10-23 | End: 2023-10-24 | Stop reason: HOSPADM

## 2023-10-23 RX ORDER — METHYLPREDNISOLONE ACETATE 40 MG/ML
INJECTION, SUSPENSION INTRA-ARTICULAR; INTRALESIONAL; INTRAMUSCULAR; SOFT TISSUE
Status: DISCONTINUED | OUTPATIENT
Start: 2023-10-23 | End: 2023-10-23 | Stop reason: HOSPADM

## 2023-10-23 RX ORDER — HYDROMORPHONE HYDROCHLORIDE 1 MG/ML
0.2 INJECTION, SOLUTION INTRAMUSCULAR; INTRAVENOUS; SUBCUTANEOUS EVERY 5 MIN PRN
Status: DISCONTINUED | OUTPATIENT
Start: 2023-10-23 | End: 2023-10-23 | Stop reason: HOSPADM

## 2023-10-23 RX ORDER — CETIRIZINE HYDROCHLORIDE 10 MG/1
10 TABLET ORAL NIGHTLY
Status: DISCONTINUED | OUTPATIENT
Start: 2023-10-23 | End: 2023-10-24 | Stop reason: HOSPADM

## 2023-10-23 RX ORDER — SODIUM CHLORIDE 0.9 % (FLUSH) 0.9 %
10 SYRINGE (ML) INJECTION
Status: DISCONTINUED | OUTPATIENT
Start: 2023-10-23 | End: 2023-10-23 | Stop reason: HOSPADM

## 2023-10-23 RX ORDER — HYDROMORPHONE HYDROCHLORIDE 1 MG/ML
1 INJECTION, SOLUTION INTRAMUSCULAR; INTRAVENOUS; SUBCUTANEOUS EVERY 6 HOURS PRN
Status: DISCONTINUED | OUTPATIENT
Start: 2023-10-23 | End: 2023-10-24 | Stop reason: HOSPADM

## 2023-10-23 RX ORDER — ONDANSETRON 2 MG/ML
4 INJECTION INTRAMUSCULAR; INTRAVENOUS EVERY 6 HOURS PRN
Status: DISCONTINUED | OUTPATIENT
Start: 2023-10-23 | End: 2023-10-24 | Stop reason: HOSPADM

## 2023-10-23 RX ORDER — ACETAMINOPHEN 500 MG
500 TABLET ORAL EVERY 6 HOURS PRN
Status: ON HOLD | COMMUNITY
End: 2023-10-24 | Stop reason: HOSPADM

## 2023-10-23 RX ORDER — PREGABALIN 150 MG/1
150 CAPSULE ORAL 2 TIMES DAILY
Status: DISCONTINUED | OUTPATIENT
Start: 2023-10-23 | End: 2023-10-24 | Stop reason: HOSPADM

## 2023-10-23 RX ORDER — PANTOPRAZOLE SODIUM 20 MG/1
20 TABLET, DELAYED RELEASE ORAL DAILY
Status: DISCONTINUED | OUTPATIENT
Start: 2023-10-23 | End: 2023-10-24 | Stop reason: HOSPADM

## 2023-10-23 RX ORDER — EPHEDRINE SULFATE 50 MG/ML
INJECTION, SOLUTION INTRAVENOUS
Status: DISCONTINUED | OUTPATIENT
Start: 2023-10-23 | End: 2023-10-23

## 2023-10-23 RX ORDER — VENLAFAXINE HYDROCHLORIDE 75 MG/1
75 CAPSULE, EXTENDED RELEASE ORAL DAILY
Status: DISCONTINUED | OUTPATIENT
Start: 2023-10-23 | End: 2023-10-23

## 2023-10-23 RX ORDER — FENTANYL CITRATE 50 UG/ML
25 INJECTION, SOLUTION INTRAMUSCULAR; INTRAVENOUS EVERY 5 MIN PRN
Status: DISCONTINUED | OUTPATIENT
Start: 2023-10-23 | End: 2023-10-23 | Stop reason: HOSPADM

## 2023-10-23 RX ORDER — OXYCODONE HYDROCHLORIDE 5 MG/1
5 TABLET ORAL
Status: DISCONTINUED | OUTPATIENT
Start: 2023-10-23 | End: 2023-10-24 | Stop reason: HOSPADM

## 2023-10-23 RX ORDER — MIDAZOLAM HYDROCHLORIDE 1 MG/ML
INJECTION, SOLUTION INTRAMUSCULAR; INTRAVENOUS
Status: DISCONTINUED | OUTPATIENT
Start: 2023-10-23 | End: 2023-10-23

## 2023-10-23 RX ORDER — BACLOFEN 10 MG/1
10 TABLET ORAL 3 TIMES DAILY
Status: DISCONTINUED | OUTPATIENT
Start: 2023-10-23 | End: 2023-10-24 | Stop reason: HOSPADM

## 2023-10-23 RX ORDER — MUPIROCIN 20 MG/G
OINTMENT TOPICAL
Status: DISCONTINUED | OUTPATIENT
Start: 2023-10-23 | End: 2023-10-23 | Stop reason: HOSPADM

## 2023-10-23 RX ORDER — LEVETIRACETAM 500 MG/1
500 TABLET ORAL 2 TIMES DAILY
Status: DISCONTINUED | OUTPATIENT
Start: 2023-10-23 | End: 2023-10-24 | Stop reason: HOSPADM

## 2023-10-23 RX ORDER — CEFAZOLIN SODIUM 1 G/3ML
INJECTION, POWDER, FOR SOLUTION INTRAMUSCULAR; INTRAVENOUS
Status: DISCONTINUED | OUTPATIENT
Start: 2023-10-23 | End: 2023-10-23

## 2023-10-23 RX ORDER — PHENYLEPHRINE HYDROCHLORIDE 10 MG/ML
INJECTION INTRAVENOUS CONTINUOUS PRN
Status: DISCONTINUED | OUTPATIENT
Start: 2023-10-23 | End: 2023-10-23

## 2023-10-23 RX ORDER — GLUCAGON 1 MG
1 KIT INJECTION
Status: DISCONTINUED | OUTPATIENT
Start: 2023-10-23 | End: 2023-10-24 | Stop reason: HOSPADM

## 2023-10-23 RX ORDER — FENTANYL CITRATE 50 UG/ML
INJECTION, SOLUTION INTRAMUSCULAR; INTRAVENOUS
Status: DISCONTINUED | OUTPATIENT
Start: 2023-10-23 | End: 2023-10-23

## 2023-10-23 RX ORDER — KETAMINE HCL IN 0.9 % NACL 50 MG/5 ML
SYRINGE (ML) INTRAVENOUS
Status: DISCONTINUED | OUTPATIENT
Start: 2023-10-23 | End: 2023-10-23

## 2023-10-23 RX ORDER — LIDOCAINE HYDROCHLORIDE 20 MG/ML
INJECTION, SOLUTION EPIDURAL; INFILTRATION; INTRACAUDAL; PERINEURAL
Status: DISCONTINUED | OUTPATIENT
Start: 2023-10-23 | End: 2023-10-23

## 2023-10-23 RX ORDER — ROCURONIUM BROMIDE 10 MG/ML
INJECTION, SOLUTION INTRAVENOUS
Status: DISCONTINUED | OUTPATIENT
Start: 2023-10-23 | End: 2023-10-23

## 2023-10-23 RX ORDER — ACETAMINOPHEN 500 MG
1000 TABLET ORAL EVERY 8 HOURS
Status: DISCONTINUED | OUTPATIENT
Start: 2023-10-23 | End: 2023-10-24 | Stop reason: HOSPADM

## 2023-10-23 RX ORDER — REMIFENTANIL HYDROCHLORIDE 1 MG/ML
INJECTION, POWDER, LYOPHILIZED, FOR SOLUTION INTRAVENOUS CONTINUOUS PRN
Status: DISCONTINUED | OUTPATIENT
Start: 2023-10-23 | End: 2023-10-23

## 2023-10-23 RX ORDER — AMOXICILLIN 250 MG
2 CAPSULE ORAL 2 TIMES DAILY
Status: DISCONTINUED | OUTPATIENT
Start: 2023-10-23 | End: 2023-10-24 | Stop reason: HOSPADM

## 2023-10-23 RX ORDER — SODIUM CHLORIDE 9 MG/ML
INJECTION, SOLUTION INTRAVENOUS CONTINUOUS
Status: DISCONTINUED | OUTPATIENT
Start: 2023-10-23 | End: 2023-10-24 | Stop reason: HOSPADM

## 2023-10-23 RX ORDER — METHOCARBAMOL 500 MG/1
1000 TABLET, FILM COATED ORAL 3 TIMES DAILY
Status: DISCONTINUED | OUTPATIENT
Start: 2023-10-23 | End: 2023-10-23

## 2023-10-23 RX ORDER — LIDOCAINE HYDROCHLORIDE AND EPINEPHRINE 10; 10 MG/ML; UG/ML
INJECTION, SOLUTION INFILTRATION; PERINEURAL
Status: DISCONTINUED | OUTPATIENT
Start: 2023-10-23 | End: 2023-10-23 | Stop reason: HOSPADM

## 2023-10-23 RX ORDER — ONDANSETRON 2 MG/ML
INJECTION INTRAMUSCULAR; INTRAVENOUS
Status: DISCONTINUED | OUTPATIENT
Start: 2023-10-23 | End: 2023-10-23

## 2023-10-23 RX ORDER — TALC
6 POWDER (GRAM) TOPICAL NIGHTLY PRN
Status: DISCONTINUED | OUTPATIENT
Start: 2023-10-23 | End: 2023-10-24 | Stop reason: HOSPADM

## 2023-10-23 RX ORDER — LIDOCAINE HYDROCHLORIDE 10 MG/ML
1 INJECTION, SOLUTION EPIDURAL; INFILTRATION; INTRACAUDAL; PERINEURAL ONCE AS NEEDED
Status: COMPLETED | OUTPATIENT
Start: 2023-10-23 | End: 2023-10-23

## 2023-10-23 RX ORDER — POLYETHYLENE GLYCOL 3350 17 G/17G
17 POWDER, FOR SOLUTION ORAL DAILY
Status: DISCONTINUED | OUTPATIENT
Start: 2023-10-23 | End: 2023-10-24 | Stop reason: HOSPADM

## 2023-10-23 RX ORDER — PHENYLEPHRINE HCL IN 0.9% NACL 1 MG/10 ML
SYRINGE (ML) INTRAVENOUS
Status: DISCONTINUED | OUTPATIENT
Start: 2023-10-23 | End: 2023-10-23

## 2023-10-23 RX ORDER — IBUPROFEN 200 MG
24 TABLET ORAL
Status: DISCONTINUED | OUTPATIENT
Start: 2023-10-23 | End: 2023-10-24 | Stop reason: HOSPADM

## 2023-10-23 RX ADMIN — EPHEDRINE SULFATE 10 MG: 50 INJECTION INTRAVENOUS at 07:10

## 2023-10-23 RX ADMIN — ACETAMINOPHEN 1000 MG: 500 TABLET ORAL at 03:10

## 2023-10-23 RX ADMIN — MUPIROCIN: 20 OINTMENT TOPICAL at 06:10

## 2023-10-23 RX ADMIN — EPHEDRINE SULFATE 5 MG: 50 INJECTION INTRAVENOUS at 08:10

## 2023-10-23 RX ADMIN — CEFAZOLIN 2 G: 330 INJECTION, POWDER, FOR SOLUTION INTRAMUSCULAR; INTRAVENOUS at 07:10

## 2023-10-23 RX ADMIN — BACLOFEN 10 MG: 10 TABLET ORAL at 03:10

## 2023-10-23 RX ADMIN — EPHEDRINE SULFATE 10 MG: 50 INJECTION INTRAVENOUS at 08:10

## 2023-10-23 RX ADMIN — Medication 200 MCG: at 07:10

## 2023-10-23 RX ADMIN — EPHEDRINE SULFATE 10 MG: 50 INJECTION INTRAVENOUS at 09:10

## 2023-10-23 RX ADMIN — ONDANSETRON 4 MG: 2 INJECTION INTRAMUSCULAR; INTRAVENOUS at 09:10

## 2023-10-23 RX ADMIN — SODIUM CHLORIDE, SODIUM GLUCONATE, SODIUM ACETATE, POTASSIUM CHLORIDE, MAGNESIUM CHLORIDE, SODIUM PHOSPHATE, DIBASIC, AND POTASSIUM PHOSPHATE: .53; .5; .37; .037; .03; .012; .00082 INJECTION, SOLUTION INTRAVENOUS at 08:10

## 2023-10-23 RX ADMIN — HYDROMORPHONE HYDROCHLORIDE 1 MG: 1 INJECTION, SOLUTION INTRAMUSCULAR; INTRAVENOUS; SUBCUTANEOUS at 01:10

## 2023-10-23 RX ADMIN — LIDOCAINE HYDROCHLORIDE 0.2 MG: 10 INJECTION, SOLUTION EPIDURAL; INFILTRATION; INTRACAUDAL; PERINEURAL at 06:10

## 2023-10-23 RX ADMIN — BACLOFEN 10 MG: 10 TABLET ORAL at 08:10

## 2023-10-23 RX ADMIN — LEVETIRACETAM 500 MG: 500 TABLET, FILM COATED ORAL at 10:10

## 2023-10-23 RX ADMIN — LIDOCAINE HYDROCHLORIDE 100 MG: 20 INJECTION, SOLUTION EPIDURAL; INFILTRATION; INTRACAUDAL at 07:10

## 2023-10-23 RX ADMIN — SENNOSIDES AND DOCUSATE SODIUM 2 TABLET: 50; 8.6 TABLET ORAL at 08:10

## 2023-10-23 RX ADMIN — SUGAMMADEX 200 MG: 100 INJECTION, SOLUTION INTRAVENOUS at 09:10

## 2023-10-23 RX ADMIN — EPHEDRINE SULFATE 5 MG: 50 INJECTION INTRAVENOUS at 09:10

## 2023-10-23 RX ADMIN — Medication 6 MG: at 10:10

## 2023-10-23 RX ADMIN — Medication 20 MG: at 07:10

## 2023-10-23 RX ADMIN — PROPOFOL 120 MG: 10 INJECTION, EMULSION INTRAVENOUS at 07:10

## 2023-10-23 RX ADMIN — REMIFENTANIL HYDROCHLORIDE 0.1 MCG/KG/MIN: 5 INJECTION, POWDER, LYOPHILIZED, FOR SOLUTION INTRAVENOUS at 08:10

## 2023-10-23 RX ADMIN — ROCURONIUM BROMIDE 50 MG: 10 INJECTION INTRAVENOUS at 07:10

## 2023-10-23 RX ADMIN — SODIUM CHLORIDE: 0.9 INJECTION, SOLUTION INTRAVENOUS at 06:10

## 2023-10-23 RX ADMIN — CEFAZOLIN 2 G: 2 INJECTION, POWDER, FOR SOLUTION INTRAMUSCULAR; INTRAVENOUS at 11:10

## 2023-10-23 RX ADMIN — SENNOSIDES AND DOCUSATE SODIUM 2 TABLET: 50; 8.6 TABLET ORAL at 12:10

## 2023-10-23 RX ADMIN — OXYCODONE HYDROCHLORIDE 5 MG: 5 TABLET ORAL at 08:10

## 2023-10-23 RX ADMIN — MIDAZOLAM 2 MG: 1 INJECTION INTRAMUSCULAR; INTRAVENOUS at 06:10

## 2023-10-23 RX ADMIN — CETIRIZINE HYDROCHLORIDE 10 MG: 10 TABLET, FILM COATED ORAL at 08:10

## 2023-10-23 RX ADMIN — FENTANYL CITRATE 50 MCG: 50 INJECTION INTRAMUSCULAR; INTRAVENOUS at 07:10

## 2023-10-23 RX ADMIN — POLYETHYLENE GLYCOL 3350 17 G: 17 POWDER, FOR SOLUTION ORAL at 12:10

## 2023-10-23 RX ADMIN — HYDROMORPHONE HYDROCHLORIDE 1 MG: 1 INJECTION, SOLUTION INTRAMUSCULAR; INTRAVENOUS; SUBCUTANEOUS at 10:10

## 2023-10-23 RX ADMIN — DEXAMETHASONE SODIUM PHOSPHATE 4 MG: 4 INJECTION INTRA-ARTICULAR; INTRALESIONAL; INTRAMUSCULAR; INTRAVENOUS; SOFT TISSUE at 07:10

## 2023-10-23 RX ADMIN — CEFAZOLIN 2 G: 2 INJECTION, POWDER, FOR SOLUTION INTRAMUSCULAR; INTRAVENOUS at 03:10

## 2023-10-23 RX ADMIN — ROCURONIUM BROMIDE 10 MG: 10 INJECTION INTRAVENOUS at 08:10

## 2023-10-23 RX ADMIN — DIPHENHYDRAMINE HYDROCHLORIDE 12.5 MG: 50 INJECTION, SOLUTION INTRAMUSCULAR; INTRAVENOUS at 11:10

## 2023-10-23 RX ADMIN — SODIUM CHLORIDE: 9 INJECTION, SOLUTION INTRAVENOUS at 06:10

## 2023-10-23 RX ADMIN — PREGABALIN 150 MG: 150 CAPSULE ORAL at 08:10

## 2023-10-23 RX ADMIN — PANTOPRAZOLE SODIUM 20 MG: 20 TABLET, DELAYED RELEASE ORAL at 12:10

## 2023-10-23 RX ADMIN — OXYCODONE HYDROCHLORIDE 5 MG: 5 TABLET ORAL at 04:10

## 2023-10-23 RX ADMIN — PHENYLEPHRINE HYDROCHLORIDE 0.4 MCG/KG/MIN: 10 INJECTION INTRAVENOUS at 07:10

## 2023-10-23 RX ADMIN — ACETAMINOPHEN 1000 MG: 500 TABLET ORAL at 10:10

## 2023-10-23 RX ADMIN — OXYCODONE HYDROCHLORIDE 5 MG: 5 TABLET ORAL at 12:10

## 2023-10-23 NOTE — OP NOTE
[facility] ([facility])  Neurosurgery  Operative Note    OP Note      Date of Procedure: 10/23/2023       Pre-Operative Diagnosis: Spinal stenosis of lumbar region, unspecified whether neurogenic claudication present [M48.061]    Post-Operative Diagnosis: Post-Op Diagnosis Codes:     * Spinal stenosis of lumbar region, unspecified whether neurogenic claudication present [M48.061]    Anesthesia: General    Procedures performed:  A posterior approach for L4 decompressive laminectomy with bilateral decompression and foraminotomies with medial facetectomy with microsurgical techniques.    Surgeon: Adam Walters MD    Assistant::  Gregory Myers MD    Indication for Procedure:  This is a 63-year-old female with multilevel cervical disc disease with severe lumbar stenosis at L4-5 with neurogenic claudication.  We felt patient would benefit from surgical intervention after failing conservative management.       Operative Note:  Patient was anesthetized intubated by anesthesia.  Patient was flipped onto a prone position patient was flipped onto the post Alo table.  Back was prepped and draped in sterile fashion.  Fluoroscopy was used to localize the appropriate level.  Localization was difficult due to patient's body habitus patient was a BMI of 38.  After several localization x-rays were able to confirm we are at L4-5 level.  We made a small paramedian skin incision and then dilated up to an 18 mm 6. Tube that was parked over the right L4 lamina.  Microscope was brought in the field soft tissue was removed all microsurgical technique and then we drilled the ipsilateral L4 lamina the drilled across the contralateral lamina.  We went up to the attachment oblique was flavum but then we did a medial facetectomy then we detached removed ligamentum flavum.  We decompressed laterally inferiorly superiorly across the contralateral side for bilateral foraminotomy.  While we were decompressing inferiorly I noted little bit  of clear fluid concerning for CSF leak we did not see any obvious dural tear but because of that we elected to laid down Surgicel in fibrin glue.  We placed Depo-Medrol and then removed the metrics tube closed the wound in layers meticulous closure to all 3 layers.  That was done we closed the in layers used glue.  Patient was extubated brought to recovery room without any problems or complication.      EBL:  Minimal  Specimen Sent:  None    Case warranted a 22 modifier due to fact that the patient's BMI was 38 making this much more challenging decompression surgery.

## 2023-10-23 NOTE — CARE UPDATE
POSS Unit Based EMELI Update    Patient lay-flat for at least 24 hours. Purewick will not adhere appropriately, saturating linens frequently. High risk patient for turning frequently. Blair order placed- cleared by KYLAH ULRICH.       Lisa Tena PA-C  P[facility] Unit [facility] EMELI

## 2023-10-23 NOTE — NURSING TRANSFER
Nursing Transfer Note      10/23/2023   10:17 AM    Nurse giving handoff:Luis VAUGHN Rn  Nurse receiving handoff: Viky Zhu    Reason patient is being transferred: postop    Transfer To: [id number]    Transfer via bed    Transfer with n/a    Transported by Pacu pct    Transfer Vital Signs:  Blood Pressure:132/73  Heart Rate:96  O2:92  Temperature:97.6  Respirations:16    Telemetry: n/a  Order for Tele Monitor? N/a    Additional Lines: n/a      Medicines sent: n/a    Any special needs or follow-up needed: remain flat    Patient belongings transferred with patient: Yes, belongings bag and crutches    Chart send with patient: Yes    Notified: son    Patient reassessed at: 10/23/23  1  Upon arrival to floor: bed in lowest position

## 2023-10-23 NOTE — PLAN OF CARE
[facility] - [facility]  Initial Discharge Assessment       Primary Care Provider: Corina Harden MD    Admission Diagnosis: Spinal stenosis of lumbar region, unspecified whether neurogenic claudication present [M48.061]  Lumbar stenosis [M48.061]    Admission Date: 10/23/2023  Expected Discharge Date: 10/25/2023         Payor: [employer] / Plan: [place] [employer] / Product Type: Government /     Extended Emergency Contact Information  Primary Emergency Contact: MIHAI QIU   [place]  Mobile Phone: [phone]  Relation: Daughter  Preferred language: English   needed? No  Secondary Emergency Contact: WayneCynthia person  Address: [address] [place] [address] [address]           [place], [place] [id number] [place] [address]  Home Phone: [phone]  Mobile Phone: [phone]  Relation: Son    Discharge Plan A: Home with family, Home Health  Discharge Plan B: Home with family    No Pharmacies Listed    Initial Assessment (most recent)       Adult Discharge Assessment - 10/23/23 1500          Discharge Assessment    Assessment Type Discharge Planning Assessment     Confirmed/corrected address, phone number and insurance Yes     Confirmed Demographics Correct on Facesheet     Source of Information patient     Does patient/caregiver understand observation status Yes     Communicated GILMER with patient/caregiver Yes     People in Home child(ron), adult     Do you expect to return to your current living situation? Yes     Do you have help at home or someone to help you manage your care at home? Yes     Who are your caregiver(s) and their phone number(s)? Cynthia Black (Son)     Prior to hospitilization cognitive status: Alert/Oriented     Current cognitive status: Alert/Oriented     Walking or Climbing Stairs ambulation difficulty, requires equipment     Equipment Currently Used at Home crutches;grab bar     Readmission within 30 days? No     Patient currently being followed by outpatient case management? No      Do you currently have service(s) that help you manage your care at home? No     Do you take prescription medications? Yes     Do you have prescription coverage? Yes     Do you have any problems affording any of your prescribed medications? No     Is the patient taking medications as prescribed? yes     Who is going to help you get home at discharge? SonPati     How do you get to doctors appointments? family or friend will provide     Are you on dialysis? No     Do you take coumadin? No     DME Needed Upon Discharge  none     Discharge Plan A Home with family;Home Health     Discharge Plan B Home with family        Physical Activity    On average, how many days per week do you engage in moderate to strenuous exercise (like a brisk walk)? 0 days     On average, how many minutes do you engage in exercise at this level? 0 min        Financial Resource Strain    How hard is it for you to pay for the very basics like food, housing, medical care, and heating? Not hard at all        Housing Stability    In the last 12 months, was there a time when you were not able to pay the mortgage or rent on time? No     In the last 12 months, how many places have you lived? 1     In the last 12 months, was there a time when you did not have a steady place to sleep or slept in a shelter (including now)? No        Transportation Needs    In the past 12 months, has lack of transportation kept you from medical appointments or from getting medications? No     In the past 12 months, has lack of transportation kept you from meetings, work, or from getting things needed for daily living? No        Food Insecurity    Within the past 12 months, you worried that your food would run out before you got the money to buy more. Never true     Within the past 12 months, the food you bought just didn't last and you didn't have money to get more. Never true        Stress    Do you feel stress - tense, restless, nervous, or anxious, or unable to  sleep at night because your mind is troubled all the time - these days? Not at all        Social Connections    In a typical week, how many times do you talk on the phone with family, friends, or neighbors? More than three times a week     How often do you get together with friends or relatives? More than three times a week     How often do you attend Yarsanism or Gnosticist services? More than 4 times per year     Do you belong to any clubs or organizations such as Yarsanism groups, unions, fraternal or athletic groups, or school groups? No     How often do you attend meetings of the clubs or organizations you belong to? Never     Are you , , , , never , or living with a partner?         Alcohol Use    Q1: How often do you have a drink containing alcohol? Never     Q2: How many drinks containing alcohol do you have on a typical day when you are drinking? Patient does not drink     Q3: How often do you have six or more drinks on one occasion? Never                      Spoke with patient at bedside to complete d/c planning assessment. Patient lives with son in a single story home without steps to enter. She is Independent with ADL's and has home crutches at bedside. Verified PCP, Pharmacy and health insurance. PT/OT eval pending as patient to lay flat for 24 hours. Patient will have help at home per son. Will continue to follow for needs. Discharge Plan A and Plan B have been determined by review of patient's clinical status, future medical and therapeutic needs, and coverage/benefits for post-acute care in coordination with multidisciplinary team members.        Leslie WASHINGTON  Case Management  [facility]  [phone]

## 2023-10-23 NOTE — BRIEF OP NOTE
[facility] - [facility] (2nd Fl)  Brief Operative Note    SUMMARY     Surgery Date: 10/23/2023     Surgeon(s) and Role:     * Adam Walters MD - Primary     * Gregory Myers MD - Resident - Assisting        Pre-op Diagnosis:  Spinal stenosis of lumbar region, unspecified whether neurogenic claudication present [M48.061]    Post-op Diagnosis:  Post-Op Diagnosis Codes:     * Spinal stenosis of lumbar region, unspecified whether neurogenic claudication present [M48.061]    Procedure(s) (LRB):  MINIMALLY INVASIVE LAMINECTOMY, SPINE, LUMBAR L4-5 (N/A)    Anesthesia: General    Implants:  * No implants in log *    Operative Findings: adequate central and lateral decompression bilaterally at L4/5. Small intraop durotomy inferior to interlaminar space on right side. Appeared tamponaded with surgicel gelfoam and adheris. No obvious persistent leak. Hemostasis achieved and skin closed in usual fashion.    Estimated Blood Loss: see full op note    Estimated Blood Loss has been documented.         Specimens:   Specimen (24h ago, onward)      None            [id number]

## 2023-10-23 NOTE — TRANSFER OF CARE
"Anesthesia Transfer of Care Note    Patient: Fior Dc    Procedure(s) Performed: Procedure(s) (LRB):  MINIMALLY INVASIVE LAMINECTOMY, SPINE, LUMBAR L4-5 (N/A)    Patient location: PACU    Anesthesia Type: general    Transport from OR: Transported from OR on 6-10 L/min O2 by face mask with adequate spontaneous ventilation    Post pain: adequate analgesia    Post assessment: no apparent anesthetic complications and tolerated procedure well    Post vital signs: stable    Level of consciousness: awake, alert and oriented    Nausea/Vomiting: no nausea/vomiting    Complications: none    Transfer of care protocol was followed      Last vitals:   Visit Vitals  /68   Pulse 100   Temp 36.4 °C (97.6 °F) (Temporal)   Resp 14   Ht 5' 8" (1.727 m)   Wt 111.1 kg (245 lb)   SpO2 97%   Breastfeeding No   BMI 37.25 kg/m²     "

## 2023-10-23 NOTE — ANESTHESIA POSTPROCEDURE EVALUATION
Anesthesia Post Evaluation    Patient: Fior Dc    Procedure(s) Performed: Procedure(s) (LRB):  MINIMALLY INVASIVE LAMINECTOMY, SPINE, LUMBAR L4-5 (N/A)    Final Anesthesia Type: general      Patient location during evaluation: PACU  Patient participation: Yes- Able to Participate  Level of consciousness: awake and alert  Post-procedure vital signs: reviewed and stable  Pain management: adequate  Airway patency: patent  NAN mitigation strategies: Multimodal analgesia, Preoperative use of mandibular advancement devices or oral appliances, Intraoperative administration of CPAP, nasopharyngeal airway, or oral appliance during sedation, Verification of full reversal of neuromuscular block and Extubation while patient is awake  PONV status at discharge: No PONV  Anesthetic complications: no      Cardiovascular status: blood pressure returned to baseline, hemodynamically stable and stable  Respiratory status: unassisted and spontaneous ventilation  Hydration status: euvolemic  Follow-up not needed.          Vitals Value Taken Time   /83 10/23/23 1136   Temp 35.6 °C (96 °F) 10/23/23 1136   Pulse 96 10/23/23 1136   Resp 16 10/23/23 1136   SpO2 91 % 10/23/23 1136         Event Time   Out of Recovery 10:45:00         Pain/Alysha Score: Pain Rating Prior to Med Admin: 0 (10/23/2023 10:00 AM)  Pain Rating Post Med Admin: 0 (10/23/2023 10:30 AM)  Alysha Score: 10 (10/23/2023 11:00 AM)

## 2023-10-23 NOTE — ANESTHESIA PROCEDURE NOTES
Intubation    Date/Time: 10/23/2023 7:12 AM    Performed by: Carey Guzman CRNA  Authorized by: Sachin Fernández MD    Intubation:     Induction:  Intravenous    Intubated:  Postinduction    Mask Ventilation:  Easy mask    Attempts:  1    Attempted By:  CRNA    Method of Intubation:  Video laryngoscopy    Blade:  Calderón 3    Laryngeal View Grade: Grade I - full view of cords      Difficult Airway Encountered?: No      Complications:  None    Airway Device:  Oral endotracheal tube    Airway Device Size:  7.5    Style/Cuff Inflation:  Cuffed (inflated to minimal occlusive pressure)    Inflation Amount (mL):  7    Tube secured:  22    Secured at:  The lips    Placement Verified By:  Capnometry    Complicating Factors:  None    Findings Post-Intubation:  BS equal bilateral and atraumatic/condition of teeth unchanged

## 2023-10-23 NOTE — PLAN OF CARE
I have reviewed the chart of Fior Dc and collaborated with Adam Walters MD in the care of the patient who is hospitalized for the following:    Active Hospital Problems    Diagnosis    *Lumbar stenosis    CLL (chronic lymphocytic leukemia)    Seizure disorder    Depression    Class 2 obesity due to excess calories with body mass index (BMI) of 37.0 to 37.9 in adult    DDD (degenerative disc disease), lumbar          I have reviewed the Fior Neilyajaira Dc with the multidisciplinary team during discharge huddle.       Lisa Tena PA-C  [facility]

## 2023-10-23 NOTE — NURSING
Nurses Note -- 4 Eyes      10/23/2023   2:33 PM      Skin assessed during: Daily Assessment      [x] No Altered Skin Integrity Present    []Prevention Measures Documented      [] Yes- Altered Skin Integrity Present or Discovered   [] LDA Added if Not in Epic (Describe Wound)   [] New Altered Skin Integrity was Present on Admit and Documented in LDA   [] Wound Image Taken    Wound Care Consulted? No    Attending Nurse:   Viky Benedict LPN    Second RN/Staff Member:   Mario Alberto GonzalesRN

## 2023-10-23 NOTE — PLAN OF CARE
Patient AAOX4, call light and belongings in reach, siderails up x2. Surgical site c/d/I,  patient remains flat per orders, narvaez in place now and improved for post op orders, pain controlled with current regimen.  Tolerating diet with no complaints of n/v  Remains free from falls and safety maintained.
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
## 2023-10-24 ENCOUNTER — TELEPHONE (OUTPATIENT)
Dept: SURGERY | Facility: CLINIC | Age: 63
End: 2023-10-24
Payer: OTHER GOVERNMENT

## 2023-10-24 VITALS
RESPIRATION RATE: 18 BRPM | BODY MASS INDEX: 37.13 KG/M2 | TEMPERATURE: 97 F | OXYGEN SATURATION: 95 % | SYSTOLIC BLOOD PRESSURE: 142 MMHG | DIASTOLIC BLOOD PRESSURE: 89 MMHG | HEART RATE: 62 BPM | WEIGHT: 245 LBS | HEIGHT: 68 IN

## 2023-10-24 PROBLEM — R73.9 HYPERGLYCEMIA: Status: ACTIVE | Noted: 2023-10-24

## 2023-10-24 PROBLEM — D72.829 LEUKOCYTOSIS: Status: ACTIVE | Noted: 2023-10-24

## 2023-10-24 LAB
ANION GAP SERPL CALC-SCNC: 8 MMOL/L (ref 8–16)
BASOPHILS # BLD AUTO: 0.04 K/UL (ref 0–0.2)
BASOPHILS NFR BLD: 0.2 % (ref 0–1.9)
BUN SERPL-MCNC: 12 MG/DL (ref 8–23)
CALCIUM SERPL-MCNC: 9.3 MG/DL (ref 8.7–10.5)
CHLORIDE SERPL-SCNC: 103 MMOL/L (ref 95–110)
CO2 SERPL-SCNC: 26 MMOL/L (ref 23–29)
CREAT SERPL-MCNC: 0.9 MG/DL (ref 0.5–1.4)
DIFFERENTIAL METHOD: ABNORMAL
EOSINOPHIL # BLD AUTO: 0 K/UL (ref 0–0.5)
EOSINOPHIL NFR BLD: 0.2 % (ref 0–8)
ERYTHROCYTE [DISTWIDTH] IN BLOOD BY AUTOMATED COUNT: 14.2 % (ref 11.5–14.5)
EST. GFR  (NO RACE VARIABLE): >60 ML/MIN/1.73 M^2
GLUCOSE SERPL-MCNC: 169 MG/DL (ref 70–110)
HCT VFR BLD AUTO: 36.2 % (ref 37–48.5)
HGB BLD-MCNC: 11.3 G/DL (ref 12–16)
IMM GRANULOCYTES # BLD AUTO: 0.06 K/UL (ref 0–0.04)
IMM GRANULOCYTES NFR BLD AUTO: 0.3 % (ref 0–0.5)
LYMPHOCYTES # BLD AUTO: 10.6 K/UL (ref 1–4.8)
LYMPHOCYTES NFR BLD: 57.3 % (ref 18–48)
MCH RBC QN AUTO: 28.9 PG (ref 27–31)
MCHC RBC AUTO-ENTMCNC: 31.2 G/DL (ref 32–36)
MCV RBC AUTO: 93 FL (ref 82–98)
MONOCYTES # BLD AUTO: 1 K/UL (ref 0.3–1)
MONOCYTES NFR BLD: 5.3 % (ref 4–15)
NEUTROPHILS # BLD AUTO: 6.8 K/UL (ref 1.8–7.7)
NEUTROPHILS NFR BLD: 36.7 % (ref 38–73)
NRBC BLD-RTO: 0 /100 WBC
PLATELET # BLD AUTO: 228 K/UL (ref 150–450)
PMV BLD AUTO: 10.7 FL (ref 9.2–12.9)
POTASSIUM SERPL-SCNC: 4 MMOL/L (ref 3.5–5.1)
RBC # BLD AUTO: 3.91 M/UL (ref 4–5.4)
SODIUM SERPL-SCNC: 137 MMOL/L (ref 136–145)
WBC # BLD AUTO: 18.42 K/UL (ref 3.9–12.7)

## 2023-10-24 PROCEDURE — 85007 BL SMEAR W/DIFF WBC COUNT: CPT | Performed by: STUDENT IN AN ORGANIZED HEALTH CARE EDUCATION/TRAINING PROGRAM

## 2023-10-24 PROCEDURE — 85060 BLOOD SMEAR INTERPRETATION: CPT | Mod: ,,, | Performed by: PATHOLOGY

## 2023-10-24 PROCEDURE — 80048 BASIC METABOLIC PNL TOTAL CA: CPT | Performed by: STUDENT IN AN ORGANIZED HEALTH CARE EDUCATION/TRAINING PROGRAM

## 2023-10-24 PROCEDURE — 25000003 PHARM REV CODE 250: Performed by: STUDENT IN AN ORGANIZED HEALTH CARE EDUCATION/TRAINING PROGRAM

## 2023-10-24 PROCEDURE — 63600175 PHARM REV CODE 636 W HCPCS: Performed by: STUDENT IN AN ORGANIZED HEALTH CARE EDUCATION/TRAINING PROGRAM

## 2023-10-24 PROCEDURE — 99024 POSTOP FOLLOW-UP VISIT: CPT | Mod: ,,, | Performed by: PHYSICIAN ASSISTANT

## 2023-10-24 PROCEDURE — 99024 PR POST-OP FOLLOW-UP VISIT: ICD-10-PCS | Mod: ,,, | Performed by: PHYSICIAN ASSISTANT

## 2023-10-24 PROCEDURE — 85027 COMPLETE CBC AUTOMATED: CPT | Performed by: STUDENT IN AN ORGANIZED HEALTH CARE EDUCATION/TRAINING PROGRAM

## 2023-10-24 PROCEDURE — 85060 PATHOLOGIST REVIEW: ICD-10-PCS | Mod: ,,, | Performed by: PATHOLOGY

## 2023-10-24 PROCEDURE — 36415 COLL VENOUS BLD VENIPUNCTURE: CPT | Performed by: STUDENT IN AN ORGANIZED HEALTH CARE EDUCATION/TRAINING PROGRAM

## 2023-10-24 RX ORDER — ACETAMINOPHEN 500 MG
1000 TABLET ORAL EVERY 8 HOURS
Refills: 0
Start: 2023-10-24

## 2023-10-24 RX ORDER — OXYCODONE HYDROCHLORIDE 5 MG/1
5 TABLET ORAL EVERY 4 HOURS PRN
Qty: 50 TABLET | Refills: 0 | Status: SHIPPED | OUTPATIENT
Start: 2023-10-24 | End: 2024-01-08 | Stop reason: ALTCHOICE

## 2023-10-24 RX ADMIN — PANTOPRAZOLE SODIUM 20 MG: 20 TABLET, DELAYED RELEASE ORAL at 08:10

## 2023-10-24 RX ADMIN — POLYETHYLENE GLYCOL 3350 17 G: 17 POWDER, FOR SOLUTION ORAL at 08:10

## 2023-10-24 RX ADMIN — PREGABALIN 150 MG: 150 CAPSULE ORAL at 08:10

## 2023-10-24 RX ADMIN — DIPHENHYDRAMINE HYDROCHLORIDE 12.5 MG: 50 INJECTION, SOLUTION INTRAMUSCULAR; INTRAVENOUS at 05:10

## 2023-10-24 RX ADMIN — ENOXAPARIN SODIUM 40 MG: 40 INJECTION SUBCUTANEOUS at 05:10

## 2023-10-24 RX ADMIN — BACLOFEN 10 MG: 10 TABLET ORAL at 08:10

## 2023-10-24 RX ADMIN — HYDROMORPHONE HYDROCHLORIDE 1 MG: 1 INJECTION, SOLUTION INTRAMUSCULAR; INTRAVENOUS; SUBCUTANEOUS at 08:10

## 2023-10-24 RX ADMIN — SENNOSIDES AND DOCUSATE SODIUM 2 TABLET: 50; 8.6 TABLET ORAL at 08:10

## 2023-10-24 RX ADMIN — OXYCODONE HYDROCHLORIDE 5 MG: 5 TABLET ORAL at 04:10

## 2023-10-24 RX ADMIN — ACETAMINOPHEN 1000 MG: 500 TABLET ORAL at 05:10

## 2023-10-24 RX ADMIN — LEVETIRACETAM 500 MG: 500 TABLET, FILM COATED ORAL at 08:10

## 2023-10-24 NOTE — NURSING
Pt given written and verbal discharge instructions. Pt verbalized understanding and follow up appts were fully explained. Pt IV access removed, nad or pain noted, dressing applied. Pt also given prescriptions at the bedside  and currently waiting for transport to arrive.

## 2023-10-24 NOTE — NURSING
Moderate frequently throughout the night; minor relief with Tylenol, Oxycodone, Dilaudid; also c/o frequently itching receiving Benadryl IV; vss; good urine output; continues flat in bed until 10am.

## 2023-10-24 NOTE — PLAN OF CARE
Problem: Adult Inpatient Plan of Care  Goal: Plan of Care Review  Outcome: Ongoing, Progressing  Goal: Patient-Specific Goal (Individualized)  Outcome: Ongoing, Progressing  Goal: Absence of Hospital-Acquired Illness or Injury  Outcome: Ongoing, Progressing  Goal: Optimal Comfort and Wellbeing  Outcome: Ongoing, Progressing  Goal: Readiness for Transition of Care  Outcome: Ongoing, Progressing     Problem: Fall Injury Risk  Goal: Absence of Fall and Fall-Related Injury  Outcome: Ongoing, Progressing     Problem: Skin Injury Risk Increased  Goal: Skin Health and Integrity  Outcome: Ongoing, Progressing     Problem: Infection  Goal: Absence of Infection Signs and Symptoms  Outcome: Ongoing, Progressing     Problem: Pain Acute  Goal: Acceptable Pain Control and Functional Ability  Outcome: Ongoing, Progressing

## 2023-10-24 NOTE — DISCHARGE INSTRUCTIONS
Neurosurgery Patient Information. Please follow ONLY the instructions that are checked below.    Activity Restrictions:  [x]  Return to work will be determined on an individual basis.  [x]  No lifting greater than 5-10 pounds.  [x]  Avoid bending and twisting the area of your surgery more than 45 degrees from neutral position in any direction.  [x]  No driving or operating machinery:  [x]  until cleared by your surgeon.  [x]  while taking narcotic pain medications or muscle relaxants.  [x]  No brace needed  [x]  Slowly increase your ambulation [walking] over the next 2 weeks as tolerated. The goal is to be walking 1-2 miles by the time of your 2 week post op appointment.   [x]  Walk on paved surfaces only. It is okay to walk up and down stairs while holding onto a side rail.  [x]  No sexual activity for 6 weeks.    Discharge Medication/Follow-up:  [x]  Please refer to discharge medication reconciliation form.  [x]  Do not take ANY Aspirin or Aspirin containing products for 2 weeks after surgery.   [x]  Do not take ANY herbal supplements for 2 weeks after surgery.    [x]  Do not take ANY non-steroidal anti-inflammatory drugs (NSAIDS), including the following: ibuprofen, naprosyn, Aleve, Advil, Indocin, Mobic, or Celebrex for 6 weeks after surgery.   [x]  Prescriptions for appropriate medication will be given upon discharge.  [x]  Take docusate (Colace 100 mg): take one capsule a day as needed for constipation. You can get this over the counter.  [x]  Follow-up appointment:  [x]  10-14 days post-op for wound check by physician assistant/nurse  [x]  4-6 weeks with MD:    [x]  An appointment will be mailed to you.    Wound Care:  [x]  No bandage required. Keep your incision open to the air.  [x]  You may shower on the 2nd day after your surgery. Keep the incision clean and dry at all times. Please cover the incision while showering and REMOVE once you have completed taking your shower. Do not allow the force of water to  hit the incision. If the incision gets damp, pat it dry. Do not rub or scrub the incision.  [x]  You cannot take a bath until 8 weeks after surgery.  [x]  The incision does not need to be cleaned with any water, soap, alcohol, peroxide, or other substance.      Call your doctor or go to the Emergency Room for any signs of infection, including: increased redness, drainage, pain, or fever (temperature ?101.5 for 24 hours). Call your doctor or go to the Emergency Room if there are any localized neurological changes; problems with speech, vision, numbness, tingling, weakness, or severe headache; or for other concerns.    Special Instructions:  [x]  No use of tobacco products.  [x]  Diet: Please eat a regular diet as tolerated.      Physicians need 3 days' notice for pain medicine to be refilled. Pain medicine will only be refilled between 8 AM and 5 PM, Monday through Friday, due to Food and Drug Administration regulation of documentation.        [facility] Neurosurgery Office: [phone]              [facility] Neurosurgery Office: [phone]   [place] Neurosurgery Office: [phone]

## 2023-10-24 NOTE — NURSING
Pt up at the bedside ambulating independently and getting dressed for discharge as well as packing up personal belongings.

## 2023-10-24 NOTE — DISCHARGE SUMMARY
[facility]  Neurosurgery  Discharge Summary      Patient Name: Fior Dc  MRN: [id number]  Admission Date: 10/23/2023  Hospital Length of Stay: 1 days  Discharge Date and Time: 10/24/23   Attending Physician: Adam Walters MD   Discharging Provider: Maryanne Ford PA-C  Primary Care Provider: Corina Harden MD    HPI:   This is a 63-year-old female with multilevel cervical disc disease with severe lumbar stenosis at L4-5 with neurogenic claudication.  We felt patient would benefit from surgical intervention after failing conservative management.          Procedure(s) (LRB):  MINIMALLY INVASIVE LAMINECTOMY, SPINE, LUMBAR L4-5 (N/A)     Hospital Course:   10/24: NAEON. Patient up sitting up this AM without any positional headaches. Some incisional back pain. Denies leg pain or weakness. Incision without underlying fluid collection, erythema, or edema. Blair to be discontinued, patient to void and ambulate with nursing prior to dc. Dc instructions discussed with patient and she voiced understanding.     Exam day of discharge:   General: well developed, well nourished, no distress.   Head: normocephalic, atraumatic  Neurologic: Alert and oriented. Thought content appropriate.  GCS: Motor: 6/Verbal: 5/Eyes: 4 GCS Total: 15  Mental Status: Awake, Alert, Oriented x 4  Language: No aphasia  Speech: No dysarthria  Cranial nerves: face symmetric, tongue midline, CN II-XII grossly intact.   Eyes: pupils equal, round, reactive to light with accommodation, EOMI.   Pulmonary: normal respirations, no signs of respiratory distress  Abdomen: soft, non-distended, not tender to palpation  Skin: Skin is warm, dry and intact.  Sensory: intact to light touch throughout    Motor Strength:Moves all extremities spontaneously with good tone.  Full strength upper and lower extremities. No abnormal movements seen.     Strength  Deltoids Triceps Biceps Wrist Extension Wrist Flexion Hand    Upper: R 5/5 5/5  5/5 5/5 5/5 5/5    L 5/5 5/5 5/5 5/5 5/5 5/5     Iliopsoas Quadriceps Knee  Flexion Tibialis  anterior Gastro- cnemius EHL   Lower: R 5/5 5/5 5/5 5/5 5/5 5/5    L 5/5 5/5 5/5 5/5 5/5 5/5     Incision with dermabond in place. Incision well aligned. No underlying fluid collection or drainage.               Goals of Care Treatment Preferences:  Code Status: Full Code      Consults: N/A    Significant Diagnostic Studies: N/A    Pending Diagnostic Studies:     None        Final Active Diagnoses:    Diagnosis Date Noted POA    PRINCIPAL PROBLEM:  Lumbar stenosis [M48.061] 10/23/2023 Yes    Leukocytosis [D72.829] 10/24/2023 Yes    Hyperglycemia [R73.9] 10/24/2023 No    CLL (chronic lymphocytic leukemia) [C91.10] 10/23/2023 Yes     Chronic    Seizure disorder [G40.909] 10/23/2023 Yes     Chronic    Depression [F32.A] 10/23/2023 Yes     Chronic    Class 2 obesity due to excess calories with body mass index (BMI) of 37.0 to 37.9 in adult [E66.09, Z68.37] 10/23/2023 Not Applicable    DDD (degenerative disc disease), lumbar [M51.36] 10/23/2023 Yes      Problems Resolved During this Admission:      Discharged Condition: good     Disposition: Home or Self Care    Follow Up:    Patient Instructions:      Notify your health care provider if you experience any of the following:  temperature >100.4     Notify your health care provider if you experience any of the following:  persistent nausea and vomiting or diarrhea     Notify your health care provider if you experience any of the following:  severe uncontrolled pain     Notify your health care provider if you experience any of the following:  redness, tenderness, or signs of infection (pain, swelling, redness, odor or green/yellow discharge around incision site)     Notify your health care provider if you experience any of the following:  difficulty breathing or increased cough     Notify your health care provider if you experience any of the following:  severe persistent  headache     Notify your health care provider if you experience any of the following:  worsening rash     Notify your health care provider if you experience any of the following:  persistent dizziness, light-headedness, or visual disturbances     Notify your health care provider if you experience any of the following:  increased confusion or weakness     Activity as tolerated     Medications:  Reconciled Home Medications:      Medication List      START taking these medications    oxyCODONE 5 MG immediate release tablet  Commonly known as: ROXICODONE  Take 1 tablet (5 mg total) by mouth every 4 (four) hours as needed for Pain.        CHANGE how you take these medications    acetaminophen 500 MG tablet  Commonly known as: TYLENOL  Take 2 tablets (1,000 mg total) by mouth every 8 (eight) hours.  What changed:   · how much to take  · when to take this  · reasons to take this        CONTINUE taking these medications    baclofen 10 MG tablet  Commonly known as: LIORESAL  Take 10 mg by mouth 3 (three) times daily.     carboxymethylcellulose 0.5 % Dpet  Commonly known as: REFRESH PLUS  1 drop 3 (three) times daily as needed.     ferrous gluconate 324 MG tablet  Commonly known as: FERGON  Take 324 mg by mouth daily with breakfast.     levETIRAcetam 750 MG Tab  Commonly known as: KEPPRA  Take 500 mg by mouth 2 (two) times daily.     levocetirizine 5 MG tablet  Commonly known as: XYZAL  Take 5 mg by mouth every evening.     magnesium oxide 400 mg (241.3 mg magnesium) tablet  Commonly known as: MAG-OX  Take 400 mg by mouth once daily.     melatonin 10 mg Cap  Take by mouth every evening.     pantoprazole 20 MG tablet  Commonly known as: PROTONIX  Take 20 mg by mouth once daily.     pregabalin 150 MG capsule  Commonly known as: LYRICA  Take 150 mg by mouth 2 (two) times daily.        STOP taking these medications    aspirin-acetaminophen-caffeine 250-250-65 mg 250-250-65 mg per tablet  Commonly known as: EXCEDRIN MIGRAINE      CALCIUM 500 + D 500 mg-5 mcg (200 unit) per tablet  Generic drug: calcium-vitamin D3     COLLAGEN 1500 PLUS C ORAL     cyanocobalamin 500 MCG tablet     ibuprofen 200 MG tablet  Commonly known as: ADVIL,MOTRIN     venlafaxine 75 MG 24 hr capsule  Commonly known as: EFFEXOR-XR     VITAMIN D2 50,000 unit Cap  Generic drug: ergocalciferol     zinc gluconate 50 mg tablet            Maryanne Ford PA-C  Neurosurgery  [facility] - Surgery

## 2023-10-24 NOTE — TELEPHONE ENCOUNTER
Called patient reference to a referral to  Ambulatory Colorectal Surgery for colon cancer screening.an inpatient at [facility]. The patient requested to be called back on 10/25/2023 re: the colon cancer screening referal

## 2023-10-24 NOTE — PLAN OF CARE
[facility]  Discharge Final Note    Primary Care Provider: Corina Harden MD    Expected Discharge Date: 10/24/2023    Final Discharge Note (most recent)       Final Note - 10/24/23 1349          Final Note    Assessment Type Final Discharge Note     Anticipated Discharge Disposition Home or Self Care     What phone number can be called within the next 1-3 days to see how you are doing after discharge? --   [phone]                    Important Message from Medicare           Future Appointments   Date Time Provider Department Center   11/9/2023  9:30 AM Trang Sorensen RN [facility] NEUROS8 [facility]   12/5/2023 10:15 AM Adam Walters MD [facility]     Patient discharged home to care of family on 10/24/23.    Leslie May RNCM  Case Management  [facility]  [phone]

## 2023-10-24 NOTE — HPI
This is a 63-year-old female with multilevel cervical disc disease with severe lumbar stenosis at L4-5 with neurogenic claudication.  We felt patient would benefit from surgical intervention after failing conservative management.

## 2023-10-24 NOTE — HOSPITAL COURSE
10/24: OSMANY. Patient up sitting up this AM without any positional headaches. Some incisional back pain. Denies leg pain or weakness. Incision without underlying fluid collection, erythema, or edema. Blair to be discontinued, patient to void and ambulate with nursing prior to dc. Dc instructions discussed with patient and she voiced understanding.     Exam day of discharge:   General: well developed, well nourished, no distress.   Head: normocephalic, atraumatic  Neurologic: Alert and oriented. Thought content appropriate.  GCS: Motor: 6/Verbal: 5/Eyes: 4 GCS Total: 15  Mental Status: Awake, Alert, Oriented x 4  Language: No aphasia  Speech: No dysarthria  Cranial nerves: face symmetric, tongue midline, CN II-XII grossly intact.   Eyes: pupils equal, round, reactive to light with accommodation, EOMI.   Pulmonary: normal respirations, no signs of respiratory distress  Abdomen: soft, non-distended, not tender to palpation  Skin: Skin is warm, dry and intact.  Sensory: intact to light touch throughout    Motor Strength:Moves all extremities spontaneously with good tone.  Full strength upper and lower extremities. No abnormal movements seen.     Strength  Deltoids Triceps Biceps Wrist Extension Wrist Flexion Hand    Upper: R 5/5 5/5 5/5 5/5 5/5 5/5    L 5/5 5/5 5/5 5/5 5/5 5/5     Iliopsoas Quadriceps Knee  Flexion Tibialis  anterior Gastro- cnemius EHL   Lower: R 5/5 5/5 5/5 5/5 5/5 5/5    L 5/5 5/5 5/5 5/5 5/5 5/5     Incision with dermabond in place. Incision well aligned. No underlying fluid collection or drainage.

## 2023-10-26 LAB — PATH REV BLD -IMP: NORMAL

## 2023-11-08 ENCOUNTER — TELEPHONE (OUTPATIENT)
Dept: NEUROSURGERY | Facility: CLINIC | Age: 63
End: 2023-11-08
Payer: OTHER GOVERNMENT

## 2023-11-08 ENCOUNTER — PATIENT MESSAGE (OUTPATIENT)
Dept: NEUROSURGERY | Facility: CLINIC | Age: 63
End: 2023-11-08
Payer: OTHER GOVERNMENT

## 2023-11-08 NOTE — TELEPHONE ENCOUNTER
"Spoke to the patient, she is doing a little better today, she has not been taking the pain medication, doesn't want "to get hooked". I advised in the immediate post op period it is beneficial to take. She has tramadol at home that she will try  "

## 2023-11-08 NOTE — TELEPHONE ENCOUNTER
"----- Message from Ramy Marte MA sent at 11/6/2023  4:46 PM CST -----  Regarding: FW: pt advice  Contact: [phone]  I called, but she asked to specifically speak to you instead.   She said she is still having back pain   ----- Message -----  From: Leah Stephen  Sent: 11/6/2023  12:39 PM CST  To: [facility] Staff  Subject: pt advice                                        Name Of Caller: self     Contact Preference?: [phone]     What is the nature of the call?: requesting a call back from Trang today regarding her surgery that she had     Additional Notes:    "Thank you for all that you do for our patients"          "

## 2023-11-09 ENCOUNTER — PATIENT MESSAGE (OUTPATIENT)
Dept: NEUROSURGERY | Facility: CLINIC | Age: 63
End: 2023-11-09

## 2023-11-09 ENCOUNTER — CLINICAL SUPPORT (OUTPATIENT)
Dept: NEUROSURGERY | Facility: CLINIC | Age: 63
End: 2023-11-09
Payer: OTHER GOVERNMENT

## 2023-11-09 DIAGNOSIS — Z98.890 S/P LUMBAR LAMINECTOMY: Primary | ICD-10-CM

## 2023-11-09 NOTE — PROGRESS NOTES
Patient seen via video visit  for 2 week post op s/p L4-5 Laminectomy with Dr Walters 10/23/2023. Patient is doing much better today from previous phone calls. She has no complaints of pain now, is taking baclofen for the muscle spasms.        Incision on lumbar spine assessed, dissolvable sutures used for closure, little redness, no swelling, or drainage, edges well approximated.     Patient was instructed as follows:   Discontinue Bacitracin after tonight.  May shower normally but pat dry after shower.  Do not submerge wound in bath tub or go swimming until released by the physician  Keep incision clean, dry and open to air as much as possible.  Patient encouraged to walk as much as possible but advised to walk with family member or friend and rest as necessary.  No lifting >10lbs.  Avoid bending and twisting the area of your surgery more than 45 degrees from neutral position in any direction.      Patient verbalized understanding of all instructions.    All questions were answered. Patient will follow up with Dr Walters 12/05/2023 . Patient was encouraged to call clinic with any future concerns prior to follow up appt. If any worsening symptoms, patient should report to ED.       Trang Sorensen, MSN, BSN, RN  Neurosurgery Nurse Navigator

## 2023-12-05 ENCOUNTER — OFFICE VISIT (OUTPATIENT)
Dept: NEUROSURGERY | Facility: CLINIC | Age: 63
End: 2023-12-05
Payer: OTHER GOVERNMENT

## 2023-12-05 VITALS — TEMPERATURE: 97 F

## 2023-12-05 DIAGNOSIS — M48.061 SPINAL STENOSIS OF LUMBAR REGION WITHOUT NEUROGENIC CLAUDICATION: Primary | ICD-10-CM

## 2023-12-05 PROCEDURE — 99213 OFFICE O/P EST LOW 20 MIN: CPT | Mod: PBBFAC | Performed by: PHYSICIAN ASSISTANT

## 2023-12-05 PROCEDURE — 99999 PR PBB SHADOW E&M-EST. PATIENT-LVL III: CPT | Mod: PBBFAC,,, | Performed by: PHYSICIAN ASSISTANT

## 2023-12-05 PROCEDURE — 99024 POSTOP FOLLOW-UP VISIT: CPT | Mod: ,,, | Performed by: PHYSICIAN ASSISTANT

## 2023-12-05 PROCEDURE — 99024 PR POST-OP FOLLOW-UP VISIT: ICD-10-PCS | Mod: ,,, | Performed by: PHYSICIAN ASSISTANT

## 2023-12-05 PROCEDURE — 99999 PR PBB SHADOW E&M-EST. PATIENT-LVL III: ICD-10-PCS | Mod: PBBFAC,,, | Performed by: PHYSICIAN ASSISTANT

## 2023-12-06 NOTE — PROGRESS NOTES
Neurosurgery  Established Patient    SCRIBE #1 NOTE: IIdalia, am scribing for, and in the presence of,  Mayi Manzano PA-C. I have scribed the entire note.       SUBJECTIVE:     History of Present Illness:  Patient is here for follow-up s/p minimally invasive laminectomy of L4-5 on 10/23/2023. This is a 63 year-old female who reports continued low back pain and right hip pain post op. She denies any radiating pain down her leg, new numbness or paresthesias. Her pain is primarily exacerbated with positional changes. She has had delayed wound healing and she has been applying Medihoney with improvement. She denies fevers, chills, or drainage.     Review of patient's allergies indicates:   Allergen Reactions    Adhesive tape-silicones     Codeine      Has to take benadryl with      Flunisolide     Gabapentin     Hydrocodone Itching     Can take with benadryl    Oxycodone Itching     Can take with benadryl        Current Outpatient Medications   Medication Sig Dispense Refill    acetaminophen (TYLENOL) 500 MG tablet Take 2 tablets (1,000 mg total) by mouth every 8 (eight) hours.  0    baclofen (LIORESAL) 10 MG tablet Take 10 mg by mouth 3 (three) times daily.      carboxymethylcellulose (REFRESH PLUS) 0.5 % Dpet 1 drop 3 (three) times daily as needed.      ferrous gluconate (FERGON) 324 MG tablet Take 324 mg by mouth daily with breakfast.      levETIRAcetam (KEPPRA) 750 MG Tab Take 500 mg by mouth 2 (two) times daily.      levocetirizine (XYZAL) 5 MG tablet Take 5 mg by mouth every evening.      magnesium oxide (MAG-OX) 400 mg (241.3 mg magnesium) tablet Take 400 mg by mouth once daily.      melatonin 10 mg Cap Take by mouth every evening.      pantoprazole (PROTONIX) 20 MG tablet Take 20 mg by mouth once daily.      pregabalin (LYRICA) 150 MG capsule Take 150 mg by mouth 2 (two) times daily.      oxyCODONE (ROXICODONE) 5 MG immediate release tablet Take 1 tablet (5 mg total) by mouth every 4 (four) hours  as needed for Pain. 50 tablet 0     No current facility-administered medications for this visit.       Past Medical History:   Diagnosis Date    Arthritis     Bulging lumbar disc     Cancer     Osteoarthritis      Past Surgical History:   Procedure Laterality Date    ABDOMINOPLASTY      APPENDECTOMY      BACK SURGERY      breast aug Bilateral     CHOLECYSTECTOMY      FOOT SURGERY      GANGLION CYST EXCISION      GASTRIC BYPASS      HIP SURGERY      HYSTERECTOMY      KNEE SURGERY      LUMBAR LAMINECTOMY N/A 10/23/2023    Procedure: MINIMALLY INVASIVE LAMINECTOMY, SPINE, LUMBAR L4-5;  Surgeon: Adam Walters MD;  Location: [facility] OR [address] [place] [address];  Service: Neurosurgery;  Laterality: N/A;    SHOULDER SURGERY       Family History       Problem Relation (Age of Onset)    Cancer Father    Diabetes Father    Hypertension Mother, Father          Social History     Socioeconomic History    Marital status: Single   Tobacco Use    Smoking status: Every Day     Current packs/day: 0.50     Average packs/day: 0.5 packs/day for 0.6 years (0.3 ttl pk-yrs)     Types: Vaping with nicotine, Cigarettes     Start date: 4/1/2023    Smokeless tobacco: Never   Substance and Sexual Activity    Alcohol use: Yes    Drug use: Never     Social Determinants of Health     Financial Resource Strain: Low Risk  (10/23/2023)    Overall Financial Resource Strain (CARDIA)     Difficulty of Paying Living Expenses: Not hard at all   Food Insecurity: No Food Insecurity (10/23/2023)    Hunger Vital Sign     Worried About Running Out of Food in the Last Year: Never true     Ran Out of Food in the Last Year: Never true   Transportation Needs: No Transportation Needs (10/23/2023)    PRAPARE - Transportation     Lack of Transportation (Medical): No     Lack of Transportation (Non-Medical): No   Physical Activity: Inactive (10/23/2023)    Exercise Vital Sign     Days of Exercise per Week: 0 days     Minutes of Exercise per Session: 0 min   Stress: No Stress Concern  Present (10/23/2023)    [employer] - Occupational Stress Questionnaire     Feeling of Stress : Not at all   Social Connections: Moderately Isolated (10/23/2023)    Social Connection and Isolation Panel [NHANES]     Frequency of Communication with Friends and Family: More than three times a week     Frequency of Social Gatherings with Friends and Family: More than three times a week     Attends Catholic Services: More than 4 times per year     Active Member of Clubs or Organizations: No     Attends Club or Organization Meetings: Never     Marital Status:    Housing Stability: Low Risk  (10/23/2023)    Housing Stability Vital Sign     Unable to Pay for Housing in the Last Year: No     Number of Places Lived in the Last Year: 1     Unstable Housing in the Last Year: No       Review of Systems   Genitourinary:  Positive for frequency. Negative for dysuria.   Musculoskeletal:  Positive for back pain.       OBJECTIVE:     Vital Signs  Temp: 97 °F (36.1 °C)  Pain Score:   5  There is no height or weight on file to calculate BMI.    Physical Exam:    Constitutional: She appears well-developed and well-nourished. She is not diaphoretic. No distress.     Skin:   Lumbar incision with midline scab, no surrounding edema, erythema or drainage     Psych/Behavior: She is alert. She is oriented to person, place, and time. She has a normal mood and affect.     Musculoskeletal: Gait is normal.      Comments: SI joint tenderness and GTB tenderness bilaterally, left greater than right. 4+ right hip flexor. Otherwise 5/5 strength of the bilateral lower extremities with flexion and extension.   Normal sensation throughout       Diagnostic Results:  X-ray of the bilateral hip and pelvis on 8/14/2023.    FINDINGS:  Pelvis:     There is a marker in place.  The bone mineralization is within normal limits.  There is no cortical step-off.  There is no evidence of periostitis.     There is narrowing of the  sacroiliac joints.  There is also narrowing of the pubic symphysis.     There is no evidence of acute fracture or traumatic malalignment.     Hips:     There are postop changes of bilateral total hip arthroplasty.  The hardware is intact.  No periprosthetic lucency is identified.     There is no evidence of a fracture or malalignment    ASSESSMENT/PLAN:     62 yo female s/p minimally invasive laminectomy of L4-5 on 10/23/2023 who presents for 6 week follow up. She continues to report low back and hip pain with walking and standing. I would like to get her involved with PT and refer her to interventional pain management for consideration of GTB injections. We also discussed the importance of weight loss in longterm spine health. We will see her back in the 3 months to reassess her symptoms.       Claudia Manzano PA-C  Neurosurgery      Note dictated with voice recognition software, please excuse any grammatical errors.      Scribe Attestation:   Scribe #1: I performed the above scribed service and the documentation accurately describes the services I performed. I attest to the accuracy of the note.    Provider Attestation for Scribe: I, Claudia Manzano, reviewed documentation as scribed in my presence, which is both accurate and complete.

## 2023-12-28 ENCOUNTER — PATIENT MESSAGE (OUTPATIENT)
Dept: NEUROSURGERY | Facility: CLINIC | Age: 63
End: 2023-12-28
Payer: OTHER GOVERNMENT

## 2023-12-29 ENCOUNTER — TELEPHONE (OUTPATIENT)
Dept: NEUROSURGERY | Facility: CLINIC | Age: 63
End: 2023-12-29
Payer: OTHER GOVERNMENT

## 2024-01-08 DIAGNOSIS — M54.16 LUMBAR RADICULOPATHY: ICD-10-CM

## 2024-01-08 DIAGNOSIS — M48.061 SPINAL STENOSIS OF LUMBAR REGION WITHOUT NEUROGENIC CLAUDICATION: ICD-10-CM

## 2024-01-08 DIAGNOSIS — Z98.890 S/P LUMBAR LAMINECTOMY: Primary | ICD-10-CM

## 2024-01-08 RX ORDER — TRAMADOL HYDROCHLORIDE 50 MG/1
50 TABLET ORAL EVERY 8 HOURS PRN
Qty: 42 TABLET | Refills: 0 | Status: SHIPPED | OUTPATIENT
Start: 2024-01-08

## 2024-02-08 ENCOUNTER — OFFICE VISIT (OUTPATIENT)
Dept: PAIN MEDICINE | Facility: CLINIC | Age: 64
End: 2024-02-08
Payer: OTHER GOVERNMENT

## 2024-02-08 VITALS
SYSTOLIC BLOOD PRESSURE: 103 MMHG | BODY MASS INDEX: 38.6 KG/M2 | WEIGHT: 253.88 LBS | DIASTOLIC BLOOD PRESSURE: 64 MMHG | HEART RATE: 106 BPM

## 2024-02-08 DIAGNOSIS — M47.816 LUMBAR SPONDYLOSIS: ICD-10-CM

## 2024-02-08 DIAGNOSIS — M48.061 SPINAL STENOSIS OF LUMBAR REGION WITHOUT NEUROGENIC CLAUDICATION: ICD-10-CM

## 2024-02-08 DIAGNOSIS — M51.36 DDD (DEGENERATIVE DISC DISEASE), LUMBAR: Primary | ICD-10-CM

## 2024-02-08 PROCEDURE — 99204 OFFICE O/P NEW MOD 45 MIN: CPT | Mod: S$PBB,,, | Performed by: PAIN MEDICINE

## 2024-02-08 PROCEDURE — 99213 OFFICE O/P EST LOW 20 MIN: CPT | Mod: PBBFAC,PN | Performed by: PAIN MEDICINE

## 2024-02-08 PROCEDURE — 99999 PR PBB SHADOW E&M-EST. PATIENT-LVL III: CPT | Mod: PBBFAC,,, | Performed by: PAIN MEDICINE

## 2024-02-08 NOTE — PROGRESS NOTES
Subjective:     Patient ID: Fior Dc is a 63 y.o. female    Chief Complaint: Low-back Pain      Referred by: Self, Aaareferral      HPI:    Initial Encounter (2/8/24):  Fior Dc is a 63 y.o. female who presents today with chronic bilateral low back pain.  This pain has been present for many years.  Has undergone low back surgery twice.  Most recently in October of 2023.  Localizes pain primarily to the bilateral lower lumbar regions.  Pain does not extend into the lower extremities.  She denies any associated numbness, tingling, weakness, bowel bladder dysfunction.  Pain is constant and worsened with activity.    Physical Therapy:  Yes.  Not effective.    Non-pharmacologic Treatment:  Rest helps         TENS?  No    Pain Medications:         Currently taking:  Lyrica, tramadol, baclofen, Tylenol    Has tried in the past:  NSAIDs    Has not tried:  TCAs, SNRIs, topical creams    Blood thinners:  None    Interventional Therapies:   Not recently    Relevant Surgeries:    Previous lumbar laminectomy   10/23/23 - MIS lumbar laminectomy at L4-5    Affecting sleep?  Yes    Affecting daily activities? yes    Depressive symptoms? No          SI/HI? No    Work status: Disabled    Pain Scores:    Best:       4/10  Worst:     10/10  Usually:   6/10  Today:    7/10    Pain Disability Index  Family/Home Responsibilities:: 7  Recreation:: 7  Social Activity:: 5  Occupation:: 8  Sexual Behavior:: 0  Self Care:: 3  Life-Support Activities:: 0  Pain Disability Index (PDI): 30    Review of Systems   Constitutional:  Negative for activity change, appetite change, chills, fatigue, fever and unexpected weight change.   HENT:  Negative for hearing loss.    Eyes:  Negative for visual disturbance.   Respiratory:  Negative for chest tightness and shortness of breath.    Cardiovascular:  Negative for chest pain.   Gastrointestinal:  Negative for abdominal pain, constipation, diarrhea, nausea and vomiting.    Genitourinary:  Negative for difficulty urinating.   Musculoskeletal:  Positive for arthralgias, back pain, gait problem and myalgias. Negative for neck pain.   Skin:  Negative for rash.   Neurological:  Negative for dizziness, weakness, light-headedness, numbness and headaches.   Psychiatric/Behavioral:  Positive for sleep disturbance. Negative for hallucinations and suicidal ideas. The patient is not nervous/anxious.        Past Medical History:   Diagnosis Date    Arthritis     Bulging lumbar disc     Cancer     Osteoarthritis        Past Surgical History:   Procedure Laterality Date    ABDOMINOPLASTY      APPENDECTOMY      BACK SURGERY      breast aug Bilateral     CHOLECYSTECTOMY      FOOT SURGERY      GANGLION CYST EXCISION      GASTRIC BYPASS      HIP SURGERY      HYSTERECTOMY      KNEE SURGERY      LUMBAR LAMINECTOMY N/A 10/23/2023    Procedure: MINIMALLY INVASIVE LAMINECTOMY, SPINE, LUMBAR L4-5;  Surgeon: Adam Walters MD;  Location: [facility] OR [address] [place] [address];  Service: Neurosurgery;  Laterality: N/A;    SHOULDER SURGERY         Social History     Socioeconomic History    Marital status: Single   Tobacco Use    Smoking status: Every Day     Current packs/day: 0.50     Average packs/day: 0.5 packs/day for 0.8 years (0.4 ttl pk-yrs)     Types: Vaping with nicotine, Cigarettes     Start date: 4/1/2023    Smokeless tobacco: Never   Substance and Sexual Activity    Alcohol use: Yes    Drug use: Never     Social Determinants of Health     Financial Resource Strain: Low Risk  (10/23/2023)    Overall Financial Resource Strain (CARDIA)     Difficulty of Paying Living Expenses: Not hard at all   Food Insecurity: No Food Insecurity (10/23/2023)    Hunger Vital Sign     Worried About Running Out of Food in the Last Year: Never true     Ran Out of Food in the Last Year: Never true   Transportation Needs: No Transportation Needs (10/23/2023)    PRAPARE - Transportation     Lack of Transportation (Medical): No     Lack of  Transportation (Non-Medical): No   Physical Activity: Inactive (10/23/2023)    Exercise Vital Sign     Days of Exercise per Week: 0 days     Minutes of Exercise per Session: 0 min   Stress: No Stress Concern Present (10/23/2023)    [employer] [place] [employer] - Occupational Stress Questionnaire     Feeling of Stress : Not at all   Social Connections: Moderately Isolated (10/23/2023)    Social Connection and Isolation Panel [NHANES]     Frequency of Communication with Friends and Family: More than three times a week     Frequency of Social Gatherings with Friends and Family: More than three times a week     Attends Adventist Services: More than 4 times per year     Active Member of Clubs or Organizations: No     Attends Club or Organization Meetings: Never     Marital Status:    Housing Stability: Low Risk  (10/23/2023)    Housing Stability Vital Sign     Unable to Pay for Housing in the Last Year: No     Number of Places Lived in the Last Year: 1     Unstable Housing in the Last Year: No       Review of patient's allergies indicates:   Allergen Reactions    Adhesive tape-silicones     Codeine      Has to take benadryl with      Flunisolide     Gabapentin     Hydrocodone Itching     Can take with benadryl    Oxycodone Itching     Can take with benadryl        Current Outpatient Medications on File Prior to Visit   Medication Sig Dispense Refill    acetaminophen (TYLENOL) 500 MG tablet Take 2 tablets (1,000 mg total) by mouth every 8 (eight) hours.  0    baclofen (LIORESAL) 10 MG tablet Take 10 mg by mouth 3 (three) times daily.      carboxymethylcellulose (REFRESH PLUS) 0.5 % Dpet 1 drop 3 (three) times daily as needed.      ferrous gluconate (FERGON) 324 MG tablet Take 324 mg by mouth daily with breakfast.      levocetirizine (XYZAL) 5 MG tablet Take 5 mg by mouth every evening.      magnesium oxide (MAG-OX) 400 mg (241.3 mg magnesium) tablet Take 400 mg by mouth once daily.      melatonin 10 mg  Cap Take by mouth every evening.      pantoprazole (PROTONIX) 20 MG tablet Take 20 mg by mouth once daily.      pregabalin (LYRICA) 150 MG capsule Take 150 mg by mouth 2 (two) times daily.      traMADoL (ULTRAM) 50 mg tablet Take 1 tablet (50 mg total) by mouth every 8 (eight) hours as needed for Pain. 42 tablet 0     No current facility-administered medications on file prior to visit.       Objective:      /64 (BP Location: Left arm, Patient Position: Sitting)   Pulse 106   Wt 115.2 kg (253 lb 13.8 oz)   BMI 38.60 kg/m²     Exam:  GEN:  Well developed, well nourished.  No acute distress.  Normal pain behavior.  HEENT:  No trauma.  Mucous membranes moist.  Nares patent bilaterally.  PSYCH: Normal affect. Thought content appropriate.  CHEST:  Breathing symmetric.  No audible wheezing.  ABD: Soft, non-distended.  SKIN:  Warm, pink, dry.  No rash on exposed areas.    EXT:  No cyanosis, clubbing, or edema.  No color change or changes in nail or hair growth.  NEURO/MUSCULOSKELETAL:  Fully alert, oriented, and appropriate. Speech normal greg. No cranial nerve deficits.   Gait:  Antalgic.  No trendelenburg sign bilaterally.   Motor Strength:  5/5 motor strength throughout lower extremities.   Sensory:  No sensory deficit in the lower extremities.   Reflexes:  Unable to elicit bilateral patellar or Achilles DTRs.  No clonus or spasticity.  L-Spine:  Limited ROM with pain on extension more than flexion.  Positive pain with axial/facet loading bilaterally.  Negative SLR bilaterally.    Positive TTP over bilateral lower lumbar paraspinals      Imaging:    Narrative & Impression    EXAMINATION:  CT LUMBAR SPINE WITHOUT CONTRAST     CLINICAL HISTORY:  Spinal stenosis, lumbar;  Spinal stenosis, lumbosacral region     TECHNIQUE:  Low-dose axial, sagittal and coronal reformations are obtained through the lumbar spine.  Contrast was not administered.     COMPARISON:  None.     FINDINGS:  Alignment: Multilevel lumbar  spondylosis with mild scoliosis lumbar spine convex LEFT.     Vertebrae: No fracture. No lytic or blastic lesion.     Discs: Diffuse disc space narrowing all levels excepting L5-S1 with vacuum phenomenon     Sacroiliac joints: Normal.     Degenerative findings:     T12-L1: Mild broad-based bulging of disc material result in mild central canal narrowing     L1-L2: Broad-based bulging of disc material and facet hypertrophy resulting in moderate central canal narrowing     L2-L3: Mild broad-based bulging of disc material with eccentric LEFT lateral bulging.  Moderate-severe LEFT neural foraminal encroachment     L3-L4: Mild broad-based bulging of disc material resulting in mild bilateral neural foraminal encroachment     L4-L5: Broad-based bulging of disc material with facet hypertrophy resulting in severe central canal narrowing and severe bilateral neural foraminal encroachment.     L5-S1: Moderate facet hypertrophy, ligamentum thickening and broad-based bulging of disc material result in at least moderate central canal narrowing.     Paraspinal muscles & soft tissues: Unremarkable.     Impression:     Multilevel lumbar spondylosis, with disc space narrowing vacuum phenomenon and multilevel central canal narrowing, worst at L4-L5 and to a slightly lesser degree L4-5-S1.  Level by level details as above.        Electronically signed by: Paxtno Pineda MD  Date:                                            09/14/2023  Time:                                           07:46    Encounter    View Encounter             Assessment:       Encounter Diagnoses   Name Primary?    DDD (degenerative disc disease), lumbar Yes    Spinal stenosis of lumbar region without neurogenic claudication     Lumbar spondylosis          Plan:       Fior was seen today for low-back pain.    Diagnoses and all orders for this visit:    DDD (degenerative disc disease), lumbar    Spinal stenosis of lumbar region without neurogenic  claudication    Lumbar spondylosis        Fior Dc is a 63 y.o. female with chronic bilateral low back pain.  At this time, pain appears to be mostly axial and likely related to lower lumbar facet joints.  Not having overt signs or symptoms of neurogenic claudication or radiculopathy.    Pertinent imaging studies reviewed by me. Imaging results were discussed with patient.  Schedule for bilateral L3, L4, L5 medial branch blocks x2.  If diagnostic can proceed with radiofrequency ablations.    Return to clinic after procedure to discuss results.            This note was created by combination of typed  and M-Modal dictation. Transcription and phonetic errors may be present.  If there are any questions, please contact me.

## 2024-02-20 ENCOUNTER — OFFICE VISIT (OUTPATIENT)
Dept: NEUROSURGERY | Facility: CLINIC | Age: 64
End: 2024-02-20
Payer: OTHER GOVERNMENT

## 2024-02-20 DIAGNOSIS — M48.061 SPINAL STENOSIS OF LUMBAR REGION WITHOUT NEUROGENIC CLAUDICATION: Primary | ICD-10-CM

## 2024-02-20 PROCEDURE — 99213 OFFICE O/P EST LOW 20 MIN: CPT | Mod: PBBFAC | Performed by: NEUROLOGICAL SURGERY

## 2024-02-20 PROCEDURE — 99214 OFFICE O/P EST MOD 30 MIN: CPT | Mod: S$PBB,,, | Performed by: NEUROLOGICAL SURGERY

## 2024-02-20 PROCEDURE — 99999 PR PBB SHADOW E&M-EST. PATIENT-LVL III: CPT | Mod: PBBFAC,,, | Performed by: NEUROLOGICAL SURGERY

## 2024-02-20 RX ORDER — ZINC SULFATE 50(220)MG
1 CAPSULE ORAL DAILY
COMMUNITY
Start: 2023-05-19

## 2024-02-20 RX ORDER — ASCORBIC ACID 250 MG
250 TABLET ORAL DAILY
COMMUNITY

## 2024-02-20 RX ORDER — ERGOCALCIFEROL (VITAMIN D2) 10 MCG
400 TABLET ORAL WEEKLY
COMMUNITY

## 2024-02-20 RX ORDER — BACLOFEN 20 MG/1
10 TABLET ORAL
COMMUNITY
Start: 2024-01-16

## 2024-02-20 RX ORDER — CLOTRIMAZOLE 1 %
CREAM (GRAM) TOPICAL
COMMUNITY
Start: 2023-11-08

## 2024-02-20 RX ORDER — BUTALB/ACETAMINOPHEN/CAFFEINE 50-325-40
1 TABLET ORAL 2 TIMES DAILY
COMMUNITY
Start: 2023-10-18

## 2024-02-20 RX ORDER — PANTOPRAZOLE SODIUM 40 MG/1
40 TABLET, DELAYED RELEASE ORAL
COMMUNITY
Start: 2023-05-19

## 2024-02-22 NOTE — PROGRESS NOTES
Neurosurgery  Established Patient    SUBJECTIVE:     History of Present Illness:  Patient comes back to see me for follow-up after last evaluation the patient on 12/05/2023.  This is a patient was who status post L4-5 MIS laminectomy on 10/23/2023.  Patient has multilevel disc disease a previous decompression at L5-S1.  At that point time she would predominantly L5 radiculopathy and some claudication so we we can try a minimally invasive decompression 1 level see how she does but she was definitely a risk meeting much bigger operation in multilevel decompression fusion.  She did well for awhile now having increasing lower back pain with the right sided hip pain.  She denies any weakness or numbness.  She has had updated injections without any significant we.  He has had physical therapy without relief.  She denies any bowel bladder issues    Review of patient's allergies indicates:   Allergen Reactions    Adhesive tape-silicones     Codeine      Has to take benadryl with      Flunisolide     Gabapentin     Hydrocodone Itching     Can take with benadryl    Oxycodone Itching     Can take with benadryl        Current Outpatient Medications   Medication Sig Dispense Refill    acetaminophen (TYLENOL) 500 MG tablet Take 2 tablets (1,000 mg total) by mouth every 8 (eight) hours.  0    ascorbic acid, vitamin C, (VITAMIN C) 250 MG tablet Take 250 mg by mouth once daily.      baclofen (LIORESAL) 20 MG tablet 10 mg. TAKES 10 MG      carboxymethylcellulose (REFRESH PLUS) 0.5 % Dpet 1 drop 3 (three) times daily as needed.      clotrimazole (LOTRIMIN) 1 % cream APPLY MODERATE AMOUNT TOPICALLY TWICE A DAY FOR FUNGAL INFECTION      COLLAGEN MISC by Misc.(Non-Drug; Combo Route) route once daily.      ELDERBERRY FRUIT ORAL Take by mouth once daily.      ergocalciferol, vitamin D2, 10 mcg (400 unit) Tab Take 400 Units by mouth once a week.      ferrous gluconate (FERGON) 324 MG tablet Take 324 mg by mouth daily with breakfast.       INV folate 400 MCG tablet Take 400 mcg by mouth once daily. FOR INVESTIGATIONAL USE ONLY      levocetirizine (XYZAL) 5 MG tablet Take 5 mg by mouth every evening.      magnesium oxide (MAG-OX) 400 mg (241.3 mg magnesium) tablet Take 400 mg by mouth once daily.      melatonin 10 mg Cap Take by mouth every evening.      pantoprazole (PROTONIX) 40 MG tablet 40 mg.      pregabalin (LYRICA) 150 MG capsule Take 150 mg by mouth 2 (two) times daily.      traMADoL (ULTRAM) 50 mg tablet Take 1 tablet (50 mg total) by mouth every 8 (eight) hours as needed for Pain. 42 tablet 0    zinc sulfate (ZINCATE) 50 mg zinc (220 mg) capsule Take 1 capsule by mouth once daily.      baclofen (LIORESAL) 10 MG tablet Take 10 mg by mouth 3 (three) times daily.      calcium citrate-vitamin D3 315-200 mg (CITRACAL+D) 315 mg-5 mcg (200 unit) per tablet Take 1 tablet by mouth 2 (two) times daily.      pantoprazole (PROTONIX) 20 MG tablet Take 20 mg by mouth once daily.       No current facility-administered medications for this visit.       Past Medical History:   Diagnosis Date    Arthritis     Bulging lumbar disc     Cancer     Osteoarthritis      Past Surgical History:   Procedure Laterality Date    ABDOMINOPLASTY      APPENDECTOMY      BACK SURGERY      breast aug Bilateral     CHOLECYSTECTOMY      FOOT SURGERY      GANGLION CYST EXCISION      GASTRIC BYPASS      HIP SURGERY      HYSTERECTOMY      KNEE SURGERY      LUMBAR LAMINECTOMY N/A 10/23/2023    Procedure: MINIMALLY INVASIVE LAMINECTOMY, SPINE, LUMBAR L4-5;  Surgeon: Adam Walters MD;  Location: [facility] OR [address] [place] [address];  Service: Neurosurgery;  Laterality: N/A;    SHOULDER SURGERY       Family History       Problem Relation (Age of Onset)    Cancer Father    Diabetes Father    Hypertension Mother, Father          Social History     Socioeconomic History    Marital status: Single   Tobacco Use    Smoking status: Every Day     Current packs/day: 0.50     Average packs/day: 0.5 packs/day for 0.8  years (0.4 ttl pk-yrs)     Types: Vaping with nicotine, Cigarettes     Start date: 4/1/2023    Smokeless tobacco: Never   Substance and Sexual Activity    Alcohol use: Yes    Drug use: Never     Social Determinants of Health     Financial Resource Strain: Low Risk  (10/23/2023)    Overall Financial Resource Strain (CARDIA)     Difficulty of Paying Living Expenses: Not hard at all   Food Insecurity: No Food Insecurity (10/23/2023)    Hunger Vital Sign     Worried About Running Out of Food in the Last Year: Never true     Ran Out of Food in the Last Year: Never true   Transportation Needs: No Transportation Needs (10/23/2023)    PRAPARE - Transportation     Lack of Transportation (Medical): No     Lack of Transportation (Non-Medical): No   Physical Activity: Inactive (10/23/2023)    Exercise Vital Sign     Days of Exercise per Week: 0 days     Minutes of Exercise per Session: 0 min   Stress: No Stress Concern Present (10/23/2023)    [employer] [place] [employer] - Occupational Stress Questionnaire     Feeling of Stress : Not at all   Social Connections: Moderately Isolated (10/23/2023)    Social Connection and Isolation Panel [NHANES]     Frequency of Communication with Friends and Family: More than three times a week     Frequency of Social Gatherings with Friends and Family: More than three times a week     Attends Zoroastrianism Services: More than 4 times per year     Active Member of Clubs or Organizations: No     Attends Club or Organization Meetings: Never     Marital Status:    Housing Stability: Low Risk  (10/23/2023)    Housing Stability Vital Sign     Unable to Pay for Housing in the Last Year: No     Number of Places Lived in the Last Year: 1     Unstable Housing in the Last Year: No       Review of Systems    OBJECTIVE:     Vital Signs  Pain Score:   7  There is no height or weight on file to calculate BMI.    Neurosurgery Physical Exam      Diagnostic Results:  Narrative &  Impression  EXAMINATION:  CT LUMBAR SPINE WITHOUT CONTRAST     CLINICAL HISTORY:  Spinal stenosis, lumbar;  Spinal stenosis, lumbosacral region     TECHNIQUE:  Low-dose axial, sagittal and coronal reformations are obtained through the lumbar spine.  Contrast was not administered.     COMPARISON:  None.     FINDINGS:  Alignment: Multilevel lumbar spondylosis with mild scoliosis lumbar spine convex LEFT.     Vertebrae: No fracture. No lytic or blastic lesion.     Discs: Diffuse disc space narrowing all levels excepting L5-S1 with vacuum phenomenon     Sacroiliac joints: Normal.     Degenerative findings:     T12-L1: Mild broad-based bulging of disc material result in mild central canal narrowing     L1-L2: Broad-based bulging of disc material and facet hypertrophy resulting in moderate central canal narrowing     L2-L3: Mild broad-based bulging of disc material with eccentric LEFT lateral bulging.  Moderate-severe LEFT neural foraminal encroachment     L3-L4: Mild broad-based bulging of disc material resulting in mild bilateral neural foraminal encroachment     L4-L5: Broad-based bulging of disc material with facet hypertrophy resulting in severe central canal narrowing and severe bilateral neural foraminal encroachment.     L5-S1: Moderate facet hypertrophy, ligamentum thickening and broad-based bulging of disc material result in at least moderate central canal narrowing.     Paraspinal muscles & soft tissues: Unremarkable.     Impression:     Multilevel lumbar spondylosis, with disc space narrowing vacuum phenomenon and multilevel central canal narrowing, worst at L4-L5 and to a slightly lesser degree L4-5-S1.  Level by level details as above.       ASSESSMENT/PLAN:     At this point I think patient is having more lower back pain.  Patient was multiple level disc disease in may need a bigger decompression in fusion.  My partner Dr. Sharad Coe evaluate the patient were consideration of a bigger operation.  I would  like to try an L5-S1 facet injection to see if his any relief.  We will get updated MRI scan uploaded into the system.        Note dictated with voice recognition software, please excuse any grammatical errors.

## 2024-03-05 ENCOUNTER — TELEPHONE (OUTPATIENT)
Dept: PAIN MEDICINE | Facility: CLINIC | Age: 64
End: 2024-03-05
Payer: OTHER GOVERNMENT

## 2024-03-08 ENCOUNTER — TELEPHONE (OUTPATIENT)
Dept: INTERVENTIONAL RADIOLOGY/VASCULAR | Facility: CLINIC | Age: 64
End: 2024-03-08
Payer: OTHER GOVERNMENT

## 2024-03-15 ENCOUNTER — PATIENT MESSAGE (OUTPATIENT)
Dept: NEUROSURGERY | Facility: CLINIC | Age: 64
End: 2024-03-15
Payer: OTHER GOVERNMENT

## 2024-03-15 ENCOUNTER — TELEPHONE (OUTPATIENT)
Dept: INTERVENTIONAL RADIOLOGY/VASCULAR | Facility: CLINIC | Age: 64
End: 2024-03-15
Payer: OTHER GOVERNMENT

## 2024-03-22 ENCOUNTER — TELEPHONE (OUTPATIENT)
Dept: OPHTHALMOLOGY | Facility: CLINIC | Age: 64
End: 2024-03-22
Payer: OTHER GOVERNMENT

## 2024-03-25 ENCOUNTER — HOSPITAL ENCOUNTER (OUTPATIENT)
Dept: INTERVENTIONAL RADIOLOGY/VASCULAR | Facility: HOSPITAL | Age: 64
Discharge: HOME | End: 2024-03-25
Attending: NEUROLOGICAL SURGERY
Payer: OTHER GOVERNMENT

## 2024-03-25 VITALS
SYSTOLIC BLOOD PRESSURE: 118 MMHG | DIASTOLIC BLOOD PRESSURE: 79 MMHG | HEART RATE: 66 BPM | RESPIRATION RATE: 18 BRPM | OXYGEN SATURATION: 98 %

## 2024-03-25 DIAGNOSIS — M48.061 SPINAL STENOSIS OF LUMBAR REGION WITHOUT NEUROGENIC CLAUDICATION: ICD-10-CM

## 2024-03-25 PROCEDURE — 63600175 PHARM REV CODE 636 W HCPCS: Performed by: RADIOLOGY

## 2024-03-25 PROCEDURE — 25000003 PHARM REV CODE 250: Performed by: RADIOLOGY

## 2024-03-25 PROCEDURE — A4215 STERILE NEEDLE: HCPCS

## 2024-03-25 PROCEDURE — 25500020 PHARM REV CODE 255: Performed by: RADIOLOGY

## 2024-03-25 PROCEDURE — 64493 INJ PARAVERT F JNT L/S 1 LEV: CPT | Mod: 50 | Performed by: RADIOLOGY

## 2024-03-25 RX ORDER — METHYLPREDNISOLONE ACETATE 80 MG/ML
INJECTION, SUSPENSION INTRA-ARTICULAR; INTRALESIONAL; INTRAMUSCULAR; SOFT TISSUE
Status: COMPLETED | OUTPATIENT
Start: 2024-03-25 | End: 2024-03-25

## 2024-03-25 RX ORDER — LIDOCAINE HYDROCHLORIDE 10 MG/ML
INJECTION INFILTRATION; PERINEURAL
Status: COMPLETED | OUTPATIENT
Start: 2024-03-25 | End: 2024-03-25

## 2024-03-25 RX ADMIN — LIDOCAINE HYDROCHLORIDE 8 ML: 10 INJECTION, SOLUTION INFILTRATION; PERINEURAL at 01:03

## 2024-03-25 RX ADMIN — METHYLPREDNISOLONE ACETATE 80 MG: 80 INJECTION, SUSPENSION INTRA-ARTICULAR; INTRALESIONAL; INTRAMUSCULAR; SOFT TISSUE at 01:03

## 2024-03-25 RX ADMIN — IOHEXOL 2 ML: 180 INJECTION INTRAVENOUS at 01:03

## 2024-03-25 NOTE — H&P
Interventional Neuroradiology Pre-Procedure Note    Chief Complaint: Low back pain    History of Present Illness:  Fior Dc is a 63 y.o. female with low back pain is referred to [facility] for bilateral L5-S1 facet joint injections.       Past Medical History:   Diagnosis Date    Arthritis     Bulging lumbar disc     Cancer     Osteoarthritis      Past Surgical History:   Procedure Laterality Date    ABDOMINOPLASTY      APPENDECTOMY      BACK SURGERY      breast aug Bilateral     CHOLECYSTECTOMY      FOOT SURGERY      GANGLION CYST EXCISION      GASTRIC BYPASS      HIP SURGERY      HYSTERECTOMY      KNEE SURGERY      LUMBAR LAMINECTOMY N/A 10/23/2023    Procedure: MINIMALLY INVASIVE LAMINECTOMY, SPINE, LUMBAR L4-5;  Surgeon: Adam Walters MD;  Location: [facility] OR [address] [place] [address];  Service: Neurosurgery;  Laterality: N/A;    SHOULDER SURGERY         Home Meds:   Prior to Admission medications    Medication Sig Start Date End Date Taking? Authorizing Provider   acetaminophen (TYLENOL) 500 MG tablet Take 2 tablets (1,000 mg total) by mouth every 8 (eight) hours. 10/24/23   Maryanne Ford PA-C   ascorbic acid, vitamin C, (VITAMIN C) 250 MG tablet Take 250 mg by mouth once daily.    Provider, Historical   baclofen (LIORESAL) 10 MG tablet Take 10 mg by mouth 3 (three) times daily.    Provider, Historical   baclofen (LIORESAL) 20 MG tablet 10 mg. TAKES 10 MG 1/16/24   Provider, Historical   calcium citrate-vitamin D3 315-200 mg (CITRACAL+D) 315 mg-5 mcg (200 unit) per tablet Take 1 tablet by mouth 2 (two) times daily. 10/18/23   Provider, Historical   carboxymethylcellulose (REFRESH PLUS) 0.5 % Dpet 1 drop 3 (three) times daily as needed.    Provider, Historical   clotrimazole (LOTRIMIN) 1 % cream APPLY MODERATE AMOUNT TOPICALLY TWICE A DAY FOR FUNGAL INFECTION 11/8/23   Provider, Historical   COLLAGEN MISC by Misc.(Non-Drug; Combo Route) route once daily.    Provider, Historical   ELDERBERRY FRUIT ORAL Take by  mouth once daily.    Provider, Historical   ergocalciferol, vitamin D2, 10 mcg (400 unit) Tab Take 400 Units by mouth once a week.    Provider, Historical   ferrous gluconate (FERGON) 324 MG tablet Take 324 mg by mouth daily with breakfast.    Provider, Historical   INV folate 400 MCG tablet Take 400 mcg by mouth once daily. FOR INVESTIGATIONAL USE ONLY    Provider, Historical   levocetirizine (XYZAL) 5 MG tablet Take 5 mg by mouth every evening.    Provider, Historical   magnesium oxide (MAG-OX) 400 mg (241.3 mg magnesium) tablet Take 400 mg by mouth once daily.    Provider, Historical   melatonin 10 mg Cap Take by mouth every evening.    Provider, Historical   pantoprazole (PROTONIX) 20 MG tablet Take 20 mg by mouth once daily.    Provider, Historical   pantoprazole (PROTONIX) 40 MG tablet 40 mg. 5/19/23   Provider, Historical   pregabalin (LYRICA) 150 MG capsule Take 150 mg by mouth 2 (two) times daily.    Provider, Historical   traMADoL (ULTRAM) 50 mg tablet Take 1 tablet (50 mg total) by mouth every 8 (eight) hours as needed for Pain. 1/8/24   Claudia Manzano, MOJGAN   zinc sulfate (ZINCATE) 50 mg zinc (220 mg) capsule Take 1 capsule by mouth once daily. 5/19/23   Provider, Historical       Allergies:   Review of patient's allergies indicates:   Allergen Reactions    Adhesive tape-silicones     Codeine      Has to take benadryl with      Flunisolide     Gabapentin     Hydrocodone Itching     Can take with benadryl    Oxycodone Itching     Can take with benadryl      Sedation History:  have not been any systemic reactions    Review of Systems:   Hematological: negative  no known coagulopathies  Respiratory: no cough, shortness of breath, or wheezing  no shortness of breath  Cardiovascular: no chest pain or dyspnea on exertion  no chest pain  Gastrointestinal: no abdominal pain, change in bowel habits, or black or bloody stools  no abdominal pain  Genito-Urinary: no dysuria, trouble voiding, or hematuria  no  dysuria  Musculoskeletal: +back pain  Neurological: no TIA or stroke symptoms         OBJECTIVE:     Vital Signs (Most Recent)       Physical Exam:  ASA: 2  Mallampati: 2    General: no acute distress  Mental Status: alert and oriented to person, place and time  HEENT: normocephalic, atraumatic  Chest: unlabored breathing  Heart: regular heart rate  Abdomen: nondistended  Extremity: moves all extremities    Laboratory  Lab Results   Component Value Date    INR 1.0 10/09/2023       Lab Results   Component Value Date    WBC 18.42 (H) 10/24/2023    HGB 11.3 (L) 10/24/2023    HCT 36.2 (L) 10/24/2023    MCV 93 10/24/2023     10/24/2023      Lab Results   Component Value Date     (H) 10/24/2023     10/24/2023    K 4.0 10/24/2023     10/24/2023    CO2 26 10/24/2023    BUN 12 10/24/2023    CREATININE 0.9 10/24/2023    CALCIUM 9.3 10/24/2023    ALT 51 (H) 04/07/2023     (H) 04/07/2023    ALBUMIN 4.4 04/07/2023    BILITOT 0.8 04/07/2023       ASSESSMENT/PLAN:   -Sedation Plan: Up to local  -Patient will undergo: fluoroscopy guided epidural steroid & lidocaine injections at bilateral L5-S1 facet joints                Mark Jackson MD, MHA  Fellow, NeuroEndovascular Surgery, [facility]  Neurologist, [facility] [place], [place]

## 2024-03-25 NOTE — NURSING
Pt arrived to 190 ambulatory for lumbarESI. Pt oriented to unit and staff. Plan of care reviewed with patient, patient verbalizes understanding. Comfort measures utilized. Pt safely transferred to procedural table. Fall risk reviewed with patient, fall risk interventions maintained. Safety strap applied, positioner pillows utilized to minimize pressure points. Blankets applied. Pt prepped and draped utilizing standard sterile technique. Patient placed on continuous monitoring, as required by sedation policy. Timeouts completed utilizing standard universal time-out, per department and facility policy. RN to remain at bedside, continuous monitoring maintained. Pt resting comfortably. Denies pain/discomfort. Will continue to monitor. See flow sheets for monitoring, medication administration, and updates.

## 2024-03-25 NOTE — NURSING
Lumbar SHERLY  complete. Pt tolerated well. VSS. No signs or symptoms of distress noted. Pt escorted to waiting area to meet family. Written (in [facility]) and verbal discharge instructions  given and voices understanding.

## 2024-03-25 NOTE — DISCHARGE INSTRUCTIONS
For scheduling: Call Shreya at [phone]    For questions or concerns call: SAY MON-FRI 8 AM- 5PM [phone]. Radiology resident on call [phone].    For immediate concerns that are not emergent, you may call our radiology clinic at: [phone]    May remove dressing in 24 hours and shower.  Do Not sit in bath water, hot tub, or swim for 7 days

## 2024-03-25 NOTE — PROCEDURES
Radiology Post-Procedure Note    Pre Op Diagnosis: LBP    Post Op Diagnosis: Same    Procedure: Lumbar Facet    Procedure performed by: Sharad Frederick MD    Written Informed Consent Obtained: Yes    Specimen Removed: NO    Estimated Blood Loss: Minimal      Level injected: L5-S1 bilateral  Needle used: 22 gauge  Dose:  80 mg Depo-methylprednisolone   4 mL  Bupivicaine 0.25% MPF    Patient tolerated procedure well.    Sharad Frederick MD  Attending Radiologist  Interventional Neuroradiology

## 2024-03-26 ENCOUNTER — TELEPHONE (OUTPATIENT)
Dept: OPHTHALMOLOGY | Facility: CLINIC | Age: 64
End: 2024-03-26
Payer: OTHER GOVERNMENT

## 2024-03-26 NOTE — TELEPHONE ENCOUNTER
----- Message from Dominique Yang sent at 3/26/2024  9:34 AM CDT -----  Regarding: Appt  Contact: [phone]  Type:  Needs Appt    Who Called: Fior  Would the patient rather a call back or a response via [facility]? Call  Best Call Back Number: [phone]  Additional Information: Patient is calling in ref to scheduling an appt for eye lift surgery

## 2024-03-28 ENCOUNTER — TELEPHONE (OUTPATIENT)
Dept: OPHTHALMOLOGY | Facility: CLINIC | Age: 64
End: 2024-03-28
Payer: OTHER GOVERNMENT

## 2024-03-28 NOTE — TELEPHONE ENCOUNTER
----- Message from Latosha Norton sent at 3/27/2024  3:01 PM CDT -----  Regarding: FW: Appt  Contact: [phone]  Can you call this pt about waitlist?   ----- Message -----  From: Latanya Dash MA  Sent: 3/26/2024  10:52 AM CDT  To: Latosha Chancemarcel  Subject: FW: Appt                                           ----- Message -----  From: Dominique Yang  Sent: 3/26/2024   9:35 AM CDT  To: [facility] Ophthalmology Triage  Subject: Appt                                             Type:  Needs Appt    Who Called: Fior  Would the patient rather a call back or a response via [facility]? Call  Best Call Back Number: [phone]  Additional Information: Patient is calling in ref to scheduling an appt for eye lift surgery

## 2024-04-12 DIAGNOSIS — Z12.31 SCREENING MAMMOGRAM FOR HIGH-RISK PATIENT: Primary | ICD-10-CM

## 2024-04-17 ENCOUNTER — PATIENT MESSAGE (OUTPATIENT)
Dept: NEUROSURGERY | Facility: CLINIC | Age: 64
End: 2024-04-17
Payer: OTHER GOVERNMENT

## 2024-04-29 ENCOUNTER — PATIENT MESSAGE (OUTPATIENT)
Dept: NEUROSURGERY | Facility: CLINIC | Age: 64
End: 2024-04-29
Payer: OTHER GOVERNMENT

## 2024-05-24 ENCOUNTER — TELEPHONE (OUTPATIENT)
Dept: NEUROSURGERY | Facility: CLINIC | Age: 64
End: 2024-05-24
Payer: OTHER GOVERNMENT

## 2024-05-24 DIAGNOSIS — N64.9 DISORDER OF BREAST: Primary | ICD-10-CM

## 2024-05-24 NOTE — TELEPHONE ENCOUNTER
Called patient to confirm next appointment with Sharad Coe MD .  No response, LVM with date and time.  Future Appointments   Date Time Provider Department Center   5/27/2024  3:00 PM Sharad Coe MD [facility]   6/13/2024  1:00 PM [facility] USOP3 [facility]   6/13/2024  1:30 PM [facility] MAMMO1 [facility] M[facility]MO [facility]

## 2024-05-27 ENCOUNTER — HOSPITAL ENCOUNTER (OUTPATIENT)
Dept: RADIOLOGY | Facility: OTHER | Age: 64
Discharge: HOME | End: 2024-05-27
Attending: NEUROLOGICAL SURGERY
Payer: OTHER GOVERNMENT

## 2024-05-27 ENCOUNTER — OFFICE VISIT (OUTPATIENT)
Dept: SPINE | Facility: CLINIC | Age: 64
End: 2024-05-27
Payer: OTHER GOVERNMENT

## 2024-05-27 VITALS — DIASTOLIC BLOOD PRESSURE: 63 MMHG | SYSTOLIC BLOOD PRESSURE: 106 MMHG | HEART RATE: 61 BPM

## 2024-05-27 DIAGNOSIS — M81.0 AGE-RELATED OSTEOPOROSIS WITHOUT CURRENT PATHOLOGICAL FRACTURE: ICD-10-CM

## 2024-05-27 DIAGNOSIS — M41.50 DEGENERATIVE SCOLIOSIS IN ADULT PATIENT: ICD-10-CM

## 2024-05-27 DIAGNOSIS — M41.50 DEGENERATIVE SCOLIOSIS IN ADULT PATIENT: Primary | ICD-10-CM

## 2024-05-27 PROCEDURE — 99999 PR PBB SHADOW E&M-EST. PATIENT-LVL III: CPT | Mod: PBBFAC,,, | Performed by: NEUROLOGICAL SURGERY

## 2024-05-27 PROCEDURE — 72082 X-RAY EXAM ENTIRE SPI 2/3 VW: CPT | Mod: 26,,, | Performed by: STUDENT IN AN ORGANIZED HEALTH CARE EDUCATION/TRAINING PROGRAM

## 2024-05-27 PROCEDURE — 99213 OFFICE O/P EST LOW 20 MIN: CPT | Mod: PBBFAC,25 | Performed by: NEUROLOGICAL SURGERY

## 2024-05-27 PROCEDURE — 99214 OFFICE O/P EST MOD 30 MIN: CPT | Mod: S$PBB,,, | Performed by: NEUROLOGICAL SURGERY

## 2024-05-27 PROCEDURE — 72082 X-RAY EXAM ENTIRE SPI 2/3 VW: CPT | Mod: TC,FY

## 2024-05-27 RX ORDER — VALSARTAN 40 MG/1
40 TABLET ORAL
COMMUNITY
Start: 2024-04-18

## 2024-05-27 RX ORDER — METOPROLOL SUCCINATE 25 MG/1
TABLET, EXTENDED RELEASE ORAL
COMMUNITY
Start: 2024-03-15

## 2024-05-27 RX ORDER — FUROSEMIDE 40 MG/1
20 TABLET ORAL
COMMUNITY
Start: 2024-03-15

## 2024-05-27 NOTE — PROGRESS NOTES
CHIEF COMPLAINT:  Back pain    I, Thalia Deyanirasaurabh, attest that this documentation has been prepared under the direction and in the presence of Sharad Coe MD.    HPI from 2/20/2024 (From Dr. Walters):  Patient comes back to see me for follow-up after last evaluation the patient on 12/05/2023.  This is a patient was who status post L4-5 MIS laminectomy on 10/23/2023.  Patient has multilevel disc disease a previous decompression at L5-S1.  At that point time she would predominantly L5 radiculopathy and some claudication so we we can try a minimally invasive decompression 1 level see how she does but she was definitely a risk meeting much bigger operation in multilevel decompression fusion.  She did well for awhile now having increasing lower back pain with the right sided hip pain.  She denies any weakness or numbness.  She has had updated injections without any significant we.  He has had physical therapy without relief.  She denies any bowel bladder issues    Interval Hx:   Today the patient reports that she has had consistent lower back pain that started getting progressively worse since 2020. The pain is centralized to her lower back and gets worse when doing housework or any strenuous activity. She is off balance and reports tripping over her own feet and walking lopsided. She denies dropping anything or any bladder issues. She says she has history of CLL and bilateral hip and knee replacements.     Patient denies any other complaints at this time.    Review of patient's allergies indicates:   Allergen Reactions    Adhesive tape-silicones     Codeine      Has to take benadryl with      Flunisolide     Gabapentin     Hydrocodone Itching     Can take with benadryl    Oxycodone Itching     Can take with benadryl        Past Medical History:   Diagnosis Date    Arthritis     Bulging lumbar disc     Cancer     Osteoarthritis      Past Surgical History:   Procedure Laterality Date    ABDOMINOPLASTY      APPENDECTOMY       BACK SURGERY      breast aug Bilateral     CHOLECYSTECTOMY      FOOT SURGERY      GANGLION CYST EXCISION      GASTRIC BYPASS      HIP SURGERY      HYSTERECTOMY      KNEE SURGERY      LUMBAR LAMINECTOMY N/A 10/23/2023    Procedure: MINIMALLY INVASIVE LAMINECTOMY, SPINE, LUMBAR L4-5;  Surgeon: Adam Walters MD;  Location: [facility] OR [address] [place] [address];  Service: Neurosurgery;  Laterality: N/A;    SHOULDER SURGERY       Family History   Problem Relation Name Age of Onset    Hypertension Mother      Hypertension Father      Diabetes Father      Cancer Father       Social History     Tobacco Use    Smoking status: Every Day     Current packs/day: 0.50     Average packs/day: 0.5 packs/day for 1.1 years (0.5 ttl pk-yrs)     Types: Vaping with nicotine, Cigarettes     Start date: 4/1/2023    Smokeless tobacco: Never   Substance Use Topics    Alcohol use: Yes    Drug use: Never        Review of Systems   Musculoskeletal:  Positive for back pain.   All other systems reviewed and are negative.      OBJECTIVE:   Vital Signs:       Physical Exam:    Vital signs: All nursing notes and vital signs reviewed -- afebrile, vital signs stable.  Constitutional: Patient sitting comfortably in chair. Appears well developed and well nourished.  Skin: Exposed areas are intact without abnormal markings, rashes or other lesions. Well healed lumbosacral incision.   HEENT: Normocephalic. Normal conjunctivae.  Cardiovascular: Normal rate and regular rhythm.  Respiratory: Chest wall rises and falls symmetrically, without signs of respiratory distress.  Abdomen: Soft and non-tender.  Extremities: Warm and without edema. Calves supple, non-tender.  Psych/Behavior: Normal affect.    Neurological:    Mental status: Alert and oriented. Conversational and appropriate.       Cranial Nerves: VFF to confrontation. PERRL. EOMI without nystagmus. Facial STLT normal and symmetric. Strong, symmetric muscles of mastication. Facial strength full and symmetric. Hearing equal  bilaterally to finger rub. Palate and uvula rise and fall normally in midline. Shoulder shrug 5/5 strength. Tongue midline.     Motor:    Upper:  Deltoids Triceps Biceps WE WF     R 5/5 5/5 5/5 5/5 5/5 5/5    L 5/5 5/5 5/5 5/5 5/5 5/5      Lower:  HF KE KF DF PF EHL    R 5/5 5/5 5/5 5/5 5/5 5/5    L 5/5 5/5 5/5 5/5 5/5 5/5     Sensory: Intact sensation to light touch in all extremities. Romberg negative.    Reflexes:          DTR: 2+ symmetrically throughout.     Mcduffie's: Negative.     Babinski's: Negative.     Clonus: Negative.    Cerebellar: Finger-to-nose and rapid alternating movements normal. Gait stable, fluid.    Spine:    Posture: Head well aligned over pelvis in front and side views.  No focal or global spinal deformity visible on inspection. Shoulders and hips even. No obvious leg length discrepancy. No scapula winging.    Bending: Full ROM with forward, back and lateral bending. No rib prominence with forward bend.    Cervical:      ROM: Full with flexion, extension, lateral rotation and ear-to-shoulder bend.      Midline TTP: Negative.     Spurling's test: Negative.     Lhermitte's: Negative.    Thoracic:     Midline TTP: Negative    Lumbar:     Midline TTP: Negative     Straight Leg Test: Negative     Crossed Straight Leg Test: Negative     Sciatic notch tenderness: Negative.    Other:     SI joint TTP: Negative.     Greater trochanter TTP: Negative.     Tenderness with external/internal hip rotation: Negative.    Diagnostic Results:  All imaging was independently reviewed by me.    MRI lumbar spine, dated 6/5/2023:  1. Diffuse spondylosis.   2. Multiple level foraminal stenosis.  3. Severe central stenosis at L2-3 and L4-5.     MRI brain, dated 6/5/2023  No acute findings.      CT lumbar spine, dated 9/13/2023:  1. Vacuum discs at all levels.     Scoliosis standing AP and Lateral X-ray, dated 9/13/2023:  1. Poor quality imaging.     ASSESSMENT/PLAN:     Fior Dc has flat back  deformity and lumbar stenosis which has failed conservative therapy. I recommend the studies below and follow up..    The patient understands and agrees with the plan of care. All questions were answered.     1. MRI C and T spine  2. Scoliosis xray  3. DEXA  4. RTC #1-3    I, Dr. Sharad Coe personally performed the services described in this documentation. All medical record entries made by the scribe, Thalia Luong, were at my direction and in my presence. I have reviewed the chart and agree that the record reflects my personal performance and is accurate and complete.      Sharad Coe M.D.  Department of Neurosurgery  [facility]

## 2024-05-28 ENCOUNTER — TELEPHONE (OUTPATIENT)
Dept: NEUROSURGERY | Facility: CLINIC | Age: 64
End: 2024-05-28
Payer: OTHER GOVERNMENT

## 2024-05-28 NOTE — TELEPHONE ENCOUNTER
Called nico and scheduled remaining imaging requests.  Future Appointments   Date Time Provider Department Center   6/13/2024  1:00 PM [facility] USOP3 [facility]   6/13/2024  1:30 PM [facility] MAMMO1 [facility] MAMMO [facility]   6/20/2024  8:45 AM [facility] MRI1 [facility] MRI [facility]   6/20/2024  9:30 AM [facility] MRI1 [facility] MRI [facility]   6/20/2024 10:30 AM [facility] DEXA1 [facility]      Additionally informed patient that Dr. Coe advises against using a brace.  Patient voiced understanding and thanked me.

## 2024-06-13 ENCOUNTER — HOSPITAL ENCOUNTER (OUTPATIENT)
Dept: RADIOLOGY | Facility: OTHER | Age: 64
Discharge: HOME | End: 2024-06-13
Attending: FAMILY MEDICINE
Payer: OTHER GOVERNMENT

## 2024-06-13 DIAGNOSIS — N64.9 DISORDER OF BREAST: ICD-10-CM

## 2024-06-13 DIAGNOSIS — N63.10 MASS OF RIGHT BREAST, UNSPECIFIED QUADRANT: ICD-10-CM

## 2024-06-13 PROCEDURE — 76642 ULTRASOUND BREAST LIMITED: CPT | Mod: TC,RT

## 2024-06-13 PROCEDURE — 76642 ULTRASOUND BREAST LIMITED: CPT | Mod: 26,RT,, | Performed by: RADIOLOGY

## 2024-06-13 PROCEDURE — 77066 DX MAMMO INCL CAD BI: CPT | Mod: TC

## 2024-07-24 ENCOUNTER — TELEPHONE (OUTPATIENT)
Dept: NEUROSURGERY | Facility: CLINIC | Age: 64
End: 2024-07-24
Payer: OTHER GOVERNMENT

## 2024-07-24 ENCOUNTER — PATIENT MESSAGE (OUTPATIENT)
Dept: NEUROSURGERY | Facility: CLINIC | Age: 64
End: 2024-07-24
Payer: OTHER GOVERNMENT

## 2024-07-24 NOTE — TELEPHONE ENCOUNTER
----- Message from Mitzi Tom RN sent at 7/24/2024  3:12 PM CDT -----  Spoke with pt.  She is aware of the findings of the MRI of neck (tongue/nodes) and will [facility] Fre[facility] ([facility]) so that she can order the recommended imaging since pt. Has a dx of CLL. Pt to see her oncologist in a few weeks.    Pt. Is scheduled to f/u with margaret in sept.  She states that in may was dx'd with CHF now has 3 stents and on plavix so can't have any surgery at the moment as she can't stop the plavix. Has an appt with [facility]ologist at [facility] and will discuss what to do if we offer surgery later this year.   Future Appointments  9/26/2024  2:30 PM    Sharad Coe MD         [facility] NEUROS8        Tyrone Carli  ----- Message -----  From: Anne Marie Agee NP  Sent: 7/24/2024  11:51 AM CDT  To: Mitzi Tom RN    So from a scoli standpoint not emergent but from her MRI findings with her tongue and cervical lymph nodes and hx of CLL urgent :( I can order the additional scans that radiology is recommending but I'm assuming that [facility] is at the [facility]. Can you do some digging about her oncologist?  ----- Message -----  From: Mitzi Tom RN  Sent: 7/23/2024   6:26 PM CDT  To: Anne Marie Agee NP; Mitzi Tom RN; #    Walters ref. This pt. To margaret for larger deform surgery  in February  But [facility]  needed [facility].  Finally it got approved and she did the imaging.  Can you review and let us know if she needs to get in asap or can it wait til sept?

## 2024-07-24 NOTE — TELEPHONE ENCOUNTER
----- Message from Karen Cain sent at 7/24/2024 12:51 PM CDT -----  Pt returning call to clinic     Confirmed patient's contact info below:  Contact Name: Fior Dc  Phone Number: [phone]

## 2024-09-26 ENCOUNTER — TELEPHONE (OUTPATIENT)
Dept: NEUROSURGERY | Facility: CLINIC | Age: 64
End: 2024-09-26
Payer: OTHER GOVERNMENT

## 2024-09-26 NOTE — TELEPHONE ENCOUNTER
Called patient to reschedule an upcoming appointment with Sharda Coe MD and provided next appointment date and time.  Future Appointments   Date Time Provider Department Center   10/24/2024  9:30 AM Sharad Coe MD [address]        Patient voiced understanding and thanked me.

## 2024-10-24 ENCOUNTER — OFFICE VISIT (OUTPATIENT)
Dept: NEUROSURGERY | Facility: CLINIC | Age: 64
End: 2024-10-24
Payer: OTHER GOVERNMENT

## 2024-10-24 VITALS — DIASTOLIC BLOOD PRESSURE: 67 MMHG | SYSTOLIC BLOOD PRESSURE: 103 MMHG | HEART RATE: 60 BPM

## 2024-10-24 DIAGNOSIS — M48.07 SPINAL STENOSIS, LUMBOSACRAL REGION: ICD-10-CM

## 2024-10-24 DIAGNOSIS — M48.062 LUMBAR STENOSIS WITH NEUROGENIC CLAUDICATION: Primary | ICD-10-CM

## 2024-10-24 PROCEDURE — 99214 OFFICE O/P EST MOD 30 MIN: CPT | Mod: S$PBB,,, | Performed by: PHYSICIAN ASSISTANT

## 2024-10-24 PROCEDURE — 99999 PR PBB SHADOW E&M-EST. PATIENT-LVL III: CPT | Mod: PBBFAC,,, | Performed by: PHYSICIAN ASSISTANT

## 2024-10-24 PROCEDURE — 99213 OFFICE O/P EST LOW 20 MIN: CPT | Mod: PBBFAC | Performed by: PHYSICIAN ASSISTANT

## 2024-10-24 NOTE — PROGRESS NOTES
CHIEF COMPLAINT:  Low back pain    HPI:  Patient presents for follow-up of low back pain. S/p L4-5 MIS laminectomy with Dr. Walters 10/23/23. Previous decompression at L5-S1 at OSH. Progressively worsening since 2020. She has tried L5-S1 facet injections, PT, and massage therapy without relief. The pain is constant. Occasional neck stiffness. Denies arm pain, leg pain, b/b dysfunction, hand clumsiness. She walks flexed forward as she is unable to stand upright without pain. She reports intermittent foot numbness following surgeries in both feet in the early 2000s. She underwent cardiac stent placement 3/2024 and is on plavix now. She states her cardiologist won't clear her until 1 year post-op to undergo spine fusion. She also uses nicotine but says she will quit in order to have surgery. [facility].     Patient denies any other complaints at this time.    Review of patient's allergies indicates:   Allergen Reactions    Adhesive tape-silicones     Codeine      Has to take benadryl with      Flunisolide     Gabapentin     Hydrocodone Itching     Can take with benadryl    Oxycodone Itching     Can take with benadryl        Past Medical History:   Diagnosis Date    Arthritis     Bulging lumbar disc     Cancer     Osteoarthritis      Past Surgical History:   Procedure Laterality Date    ABDOMINOPLASTY      APPENDECTOMY      BACK SURGERY      breast aug Bilateral     CHOLECYSTECTOMY      FOOT SURGERY      GANGLION CYST EXCISION      GASTRIC BYPASS      HIP SURGERY      HYSTERECTOMY      KNEE SURGERY      LUMBAR LAMINECTOMY N/A 10/23/2023    Procedure: MINIMALLY INVASIVE LAMINECTOMY, SPINE, LUMBAR L4-5;  Surgeon: Adam Walters MD;  Location: [facility] OR [address] [place] [address];  Service: Neurosurgery;  Laterality: N/A;    SHOULDER SURGERY      TOTAL REDUCTION MAMMOPLASTY       Family History   Problem Relation Name Age of Onset    Hypertension Mother      Hypertension Father      Diabetes Father       Cancer Father       Social History     Tobacco Use    Smoking status: Every Day     Current packs/day: 0.50     Average packs/day: 0.5 packs/day for 1.5 years (0.7 ttl pk-yrs)     Types: Vaping with nicotine, Cigarettes     Start date: 4/1/2023    Smokeless tobacco: Never   Substance Use Topics    Alcohol use: Yes    Drug use: Never        Review of Systems   Musculoskeletal:  Positive for back pain.   Neurological:  Positive for numbness.   All other systems reviewed and are negative.      OBJECTIVE:   Vital Signs:  Pulse: 60 (10/24/24 0908)  BP: 103/67 (10/24/24 0908)    Physical Exam:  General: well developed, well nourished, no distress.   Head: normocephalic, atraumatic  Neurologic: Alert and oriented. Thought content appropriate.  GCS: Motor: 6/Verbal: 5/Eyes: 4 GCS Total: 15  Mental Status: Awake, Alert, Oriented x 4  Language: No aphasia  Speech: No dysarthria  Cranial nerves: face symmetric, tongue midline, CN II-XII grossly intact.   Eyes: pupils equal, round, reactive to light with accomodation, EOMI.  Pulmonary: normal respirations, no signs of respiratory distress  Sensory: intact to light touch throughout  Motor Strength: Moves all extremities spontaneously with good tone.  Full strength upper and lower extremities. No abnormal movements seen.     Strength  Deltoids Triceps Biceps Wrist Extension Wrist Flexion Hand    Upper: R 5/5 5/5 5/5 5/5 5/5 5/5    L 5/5 5/5 5/5 5/5 5/5 5/5     Iliopsoas Quadriceps Knee  Flexion Tibialis  anterior Gastro- cnemius EHL   Lower: R 5/5 5/5 5/5 5/5 5/5 5/5    L 5/5 5/5 5/5 5/5 5/5 5/5     Mcduffie: absent  Clonus: absent  Skin: Skin is warm, dry and intact.  Gait: stable, fluid   B/l SI joint TTP.  Incisions well healed.    Diagnostic Results:  All imaging was independently reviewed by me.    DXA bone density, dated 7/12/2024:  1. No significant bone density loss.     MRI cervical spine, dated 7/12/2024:  1. No significant canal or foraminal stenosis.     MRI  thoracic spine, dated 7/12/2024:  1. No significant canal stenosis.     Scoliosis x-ray, dated 5/27/2024:  1. Dextroscoliosis in the thoracic spine.    ASSESSMENT/PLAN:     Fior Dc has flat back deformity and lumbar stenosis with neurogenic claudication. She had cardiac stents placed in March/April of this year and is on Plavix. [facility], she will likely be cleared for surgery at 1 year post-op to hold her antiplatelet therapy. Plan to follow-up in January 2025 to discuss T10-pelvis fusion with an updated MRI lumbar spine for this visit. Discussed need for nicotine cessation prior to surgery. Patient v/u. Referral placed to prehabilitation. She will need hematology clearance prior to surgery given her history of PLL. We discussed concerning signs and symptoms that would prompt return to clinic or urgent medical attention, patient v/u. All questions answered. Encouraged to call the clinic with questions/concerns prior to the next visit. F/u 2-3 months.       Connie Epperson PA-C  Neurosurgery  [facility]

## 2024-10-28 DIAGNOSIS — M48.061 SPINAL STENOSIS OF LUMBAR REGION WITHOUT NEUROGENIC CLAUDICATION: Primary | ICD-10-CM

## 2024-11-01 ENCOUNTER — PATIENT MESSAGE (OUTPATIENT)
Dept: NEUROSURGERY | Facility: CLINIC | Age: 64
End: 2024-11-01
Payer: OTHER GOVERNMENT

## 2024-11-01 NOTE — PROGRESS NOTES
Prehabilitation Initial Assessment      Ms. Fior Dc is a 64 y.o. female presented to the clinic for Prehabilitation. She arrives to clinic alone from [place], referred by Neurosurgery IVETT Steen and Dr. Coe. [facility], prior  service.     Past History:   Hx of CLL, s/p bilateral hip and knee replacements  2020: progressively worsening consistent lower back pain  10/2023: s/p L4-5 MIS laminectomy   3/2024: s/p cardiac stent placement    1/2025: tentatively planning T10-pelvis fusion     SUBJECTIVE:     Vital Signs:  Vitals:    11/04/24 1020   BP: (!) 96/53   Pulse: (!) 58      Body mass index is 36.69 kg/m².     Admits back pain limits her activity. She has participated in PT previously ( mostly recently outside PT in [place] last year), has had several ortho surgeries in the past including B knees, B feet, hips, laminectomy x 2, head fracture 2017. She is currently living with family. She continues to drive, run errands, is capable of her ADLs and helps with household chores. She enjoys gardening and outdoors. Previously she enjoyed biking and kayaking.   Hx of gastric bypass so states she is familiar with high protein diet, eats protein at all meals. States she has a good appetite, tolerating oral diet without N/V.   Remains on Plavix    ASSESSMENTS:     Smoking Hx Yes    Ever smoked? Yes  Started smoking age 18,  quit smoking cigarettes, started vaping 5/2023   Currently vaping? Yes    Functional Assessment    Mets  >4 Mets  Yes    Functional Screening Questions   Can you get out of bed to chair yourself?  Yes  Can you dress and bathe yourself?  Yes  Can you make your own meals?  Yes  Can you do your own shopping?  No  Result: Does some ADLS, has impairment        MANAGEMENT:     We discussed the rationale for Prehab and education regarding functional losses/risks/possible contraindications for surgical resection. The patient would like to participate in the  PREHAB program.     We discussed the vital components of the prehabilitation program and that it is designed to improve preoperative functional status, physical condition, nutritional status in order to improve perioperative outcomes. This is accomplished through a multimodal Prehab treatment protocol:     1) Nutritional Intervention  2) Physical Exercise/Pulmonary Strengthening  3) Smoking Cessation/Harm Reduction  4) Psychosocial evaluation/referral/treatment    1. Spinal stenosis of lumbar region without neurogenic claudication            Treatment Plan    1) Patient is going to start IMT (Inspiratory Muscle Training) to prevent pneumonia.   2) Not enrolled into the [facility] program.  3)Patient is going to start whey protein.   4) Patient is going to start Vitamin D.    5) Patient is going to start Omega-3 supplement.   6) Patient is referred to physical therapy  7) Patient is referred to dietitian  8) Patient is referred to psychology oncology.  9) Patient is going to stop vaping, complete tobacco cessation.       Please schedule prehab f/u [facility] in 10-14 days.       Emily Rojo NP  Nurse Practitioner, Prehabilitation Program and Surgical Oncology   [facility]  [address], [place] [address]

## 2024-11-04 ENCOUNTER — OFFICE VISIT (OUTPATIENT)
Dept: PSYCHIATRY | Facility: CLINIC | Age: 64
End: 2024-11-04
Payer: OTHER GOVERNMENT

## 2024-11-04 ENCOUNTER — OFFICE VISIT (OUTPATIENT)
Dept: SURGERY | Facility: CLINIC | Age: 64
End: 2024-11-04
Payer: OTHER GOVERNMENT

## 2024-11-04 ENCOUNTER — CLINICAL SUPPORT (OUTPATIENT)
Dept: NUTRITION | Facility: CLINIC | Age: 64
End: 2024-11-04
Payer: OTHER GOVERNMENT

## 2024-11-04 ENCOUNTER — CLINICAL SUPPORT (OUTPATIENT)
Dept: REHABILITATION | Facility: HOSPITAL | Age: 64
End: 2024-11-04
Payer: OTHER GOVERNMENT

## 2024-11-04 VITALS
OXYGEN SATURATION: 96 % | HEART RATE: 58 BPM | DIASTOLIC BLOOD PRESSURE: 53 MMHG | SYSTOLIC BLOOD PRESSURE: 96 MMHG | WEIGHT: 241.31 LBS | BODY MASS INDEX: 36.69 KG/M2

## 2024-11-04 DIAGNOSIS — Z00.8 ENCOUNTER FOR PRE-SURGICAL PSYCHOLOGICAL ASSESSMENT: ICD-10-CM

## 2024-11-04 DIAGNOSIS — R26.89 BALANCE PROBLEM: Primary | ICD-10-CM

## 2024-11-04 DIAGNOSIS — M48.061 SPINAL STENOSIS OF LUMBAR REGION WITHOUT NEUROGENIC CLAUDICATION: ICD-10-CM

## 2024-11-04 DIAGNOSIS — Z74.09 DECREASED FUNCTIONAL MOBILITY AND ENDURANCE: ICD-10-CM

## 2024-11-04 DIAGNOSIS — R52 PAIN AGGRAVATED BY ACTIVITIES OF DAILY LIVING: ICD-10-CM

## 2024-11-04 DIAGNOSIS — M48.061 SPINAL STENOSIS OF LUMBAR REGION WITHOUT NEUROGENIC CLAUDICATION: Primary | ICD-10-CM

## 2024-11-04 PROCEDURE — 99204 OFFICE O/P NEW MOD 45 MIN: CPT | Mod: S$PBB,,, | Performed by: NURSE PRACTITIONER

## 2024-11-04 PROCEDURE — 99214 OFFICE O/P EST MOD 30 MIN: CPT | Mod: PBBFAC,25,27 | Performed by: NURSE PRACTITIONER

## 2024-11-04 PROCEDURE — 96156 HLTH BHV ASSMT/REASSESSMENT: CPT | Mod: PBBFAC | Performed by: CASE MANAGER/CARE COORDINATOR

## 2024-11-04 PROCEDURE — 97162 PT EVAL MOD COMPLEX 30 MIN: CPT

## 2024-11-04 PROCEDURE — 96156 HLTH BHV ASSMT/REASSESSMENT: CPT | Mod: S$PBB,,, | Performed by: CASE MANAGER/CARE COORDINATOR

## 2024-11-04 PROCEDURE — 99999 PR PBB SHADOW E&M-EST. PATIENT-LVL II: CPT | Mod: PBBFAC,,, | Performed by: CASE MANAGER/CARE COORDINATOR

## 2024-11-04 PROCEDURE — 99212 OFFICE O/P EST SF 10 MIN: CPT | Mod: PBBFAC | Performed by: CASE MANAGER/CARE COORDINATOR

## 2024-11-04 PROCEDURE — 99999 PR PBB SHADOW E&M-EST. PATIENT-LVL IV: CPT | Mod: PBBFAC,,, | Performed by: NURSE PRACTITIONER

## 2024-11-04 RX ORDER — SENNOSIDES 8.6 MG/1
8.6 TABLET ORAL
COMMUNITY
Start: 2024-09-09

## 2024-11-04 RX ORDER — CLOPIDOGREL BISULFATE 75 MG/1
75 TABLET ORAL DAILY
COMMUNITY
Start: 2024-03-30

## 2024-11-04 RX ORDER — SPIRONOLACTONE 25 MG/1
25 TABLET ORAL
COMMUNITY
Start: 2024-03-22

## 2024-11-04 RX ORDER — ASPIRIN 81 MG/1
81 TABLET ORAL ONCE
COMMUNITY
Start: 2024-10-18

## 2024-11-04 RX ORDER — TRIAMCINOLONE ACETONIDE 1 MG/G
CREAM TOPICAL 2 TIMES DAILY
COMMUNITY
Start: 2024-04-15

## 2024-11-04 NOTE — PATIENT INSTRUCTIONS
Recommendations:  Make meals/snacks high in calories and protein - examples discussed  Limit sugar sweetened beverages and continue daily physical activity.   Consume at least 1 scoop of protein powder/day  Start vitamin D3 and omega-3 supplements

## 2024-11-04 NOTE — LETTER
November 5, 2024        MIESHA Mack,ANP-C  [address]  [facility] [address]             [facility] - Psychiatry  [address]  [facility] [address]  Phone: [phone]  Fax: [phone]   Patient: Fior Dc   MR Number: [id number]   YOB: 1960   Date of Visit: 11/4/2024       Dear MIESHA Kennedy, ANP-C:    Thank you for referring Fior Dc to me for evaluation. Below are the relevant portions of my assessment and plan of care.    SUMMARY:  Fior Dc is a  64 y.o. female referred by MIESHA Motley for health and behavior evaluation to maximize surgical outcomes.  The patient appears absent of disabling psychopathology or disabilities which would prevent understanding and compliance with medical treatment.  There is no evidence of suicidality.   The patient has satisfactory knowledge about surgery, appropriate expectations for health and illness following sugery, fair  understanding of the possible risks and complications of this treatment option, and a high willingness to sustain effort for lifestyle changes and health adaptations which will be required of her.  Coping with medical concerns fairly well at this time.  She reports adequate compliance with previous medical treatment as she noted she usually listens to medical recommendations.  Fior Dc has adequate social support from her best friend and children. Patient noted her son or his girlfriend would be able to be caregivers for her following surgery if needed.  Patient did note some sadness at times about wanting more friends.  The patient exhibits a fair degree of social stability.  The patient acknowledges no stressors expected to limit her ability to cope with the demands of surgery and recovery.  The patient reports no alcohol use and no illicit drug use  The patient noted she drinks 20-30 oz of coffee daily and drinks tea too.  Patient shared  that she vapes daily and noted motivation to quit.  She demonstrates adequate health literacy.    She retains the ability to complete all ADLs.      RECOMMENDATIONS  The following pre-surgical interventions may assist the patient in coping with their illness/surgery: Patient noted motivation to quit vaping prior to surgery. Depending on her ability to quit vaping on her own, she may benefit from engaging in a smoking cessation program. It is recommended that possible risks/adverse reactions be discussed with patient prior to her giving consent for surgery as she presents with a fair understanding at this time.    No other pre-surgical psychological or behavioral interventions are recommended for this patient. She was provided with information about [facility] supportive service of individual psychological intervention should this be of interest in the future. Patient also noted [facility] regarding getting [facility].    If you have questions, please do not hesitate to call me. I look forward to following Fior along with you.    Sincerely,      Giles Brewer, Shanti           CC  No Recipients

## 2024-11-04 NOTE — PROGRESS NOTES
"Established Patient - Audio Only Telehealth Visit     The patient location is: [facility]  The chief complaint leading to consultation is: spinal stenosis  Visit type: Virtual visit with audio only (telephone)  Total time spent with patient: 30 minutes        The reason for the audio only service rather than synchronous audio and video virtual visit was related to technical difficulties or patient preference/necessity.     Each patient to whom I provide medical services by telemedicine is:  (1) informed of the relationship between the physician and patient and the respective role of any other health care provider with respect to management of the patient; and (2) notified that they may decline to receive medical services by telemedicine and may withdraw from such care at any time. Patient verbally consented to receive this service via voice-only telephone call.       Oncology Nutrition Assessment for Medical Nutrition Therapy  Initial Prehab Visit    Fior Dc   1960    Referring Provider: Emily Rojo APRN, A*      Reason for Visit: nutrition counseling and education    PMHx:   Past Medical History:   Diagnosis Date    Arthritis     Bulging lumbar disc     Cancer     Osteoarthritis        Nutrition Assessment    This is a 64 y.o.female with a medical diagnosis of 1. Spinal stenosis of lumbar region without neurogenic claudication    - Ambulatory referral/consult to Hematology/Oncology/Nutrition   To prepare for neurosurgery. Patient presents today as part of prehab program.    Weight:  241 lb (109.5 kg)  Height:  5'8" (1.73 m)  BMI: 36.5    Usual BW: 240 lb  Weight Change: weight stable for the past 6 months     PG-SGA Score: 0    Allergies: Adhesive tape-silicones, Codeine, Flunisolide, Gabapentin, Hydrocodone, and Oxycodone    Current Medications:    Current Outpatient Medications:     acetaminophen (TYLENOL) 500 MG tablet, Take 2 tablets (1,000 mg total) by mouth every 8 (eight) " hours., Disp: , Rfl: 0    ascorbic acid, vitamin C, (VITAMIN C) 250 MG tablet, Take 250 mg by mouth once daily., Disp: , Rfl:     aspirin (ECOTRIN) 81 MG EC tablet, Take 81 mg by mouth once. On hold (Patient taking differently: Take 81 mg by mouth once. On hold until after surgery), Disp: , Rfl:     atorvastatin 20 mg/5 mL (4 mg/mL) Susp, Take 20 mg by mouth once daily. On hold until after surgery, Disp: , Rfl:     baclofen (LIORESAL) 10 MG tablet, Take 10 mg by mouth 3 (three) times daily. (Patient not taking: Reported on 11/4/2024), Disp: , Rfl:     baclofen (LIORESAL) 20 MG tablet, 10 mg. TAKES 10 MG, Disp: , Rfl:     calcium citrate-vitamin D3 315-200 mg (CITRACAL+D) 315 mg-5 mcg (200 unit) per tablet, Take 1 tablet by mouth 2 (two) times daily. (Patient not taking: Reported on 11/4/2024), Disp: , Rfl:     carboxymethylcellulose (REFRESH PLUS) 0.5 % Dpet, 1 drop 3 (three) times daily as needed., Disp: , Rfl:     clopidogreL (PLAVIX) 75 mg tablet, Take 75 mg by mouth once daily., Disp: , Rfl:     clotrimazole (LOTRIMIN) 1 % cream, APPLY MODERATE AMOUNT TOPICALLY TWICE A DAY FOR FUNGAL INFECTION, Disp: , Rfl:     COLLAGEN MISC, by Misc.(Non-Drug; Combo Route) route once daily., Disp: , Rfl:     ELDERBERRY FRUIT ORAL, Take by mouth once daily., Disp: , Rfl:     empagliflozin (JARDIANCE ORAL), , Disp: , Rfl:     ergocalciferol, vitamin D2, 10 mcg (400 unit) Tab, Take 400 Units by mouth once a week., Disp: , Rfl:     ferrous gluconate (FERGON) 324 MG tablet, Take 324 mg by mouth daily with breakfast., Disp: , Rfl:     furosemide (LASIX) 40 MG tablet, 20 mg., Disp: , Rfl:     INV folate 400 MCG tablet, Take 400 mcg by mouth once daily. FOR INVESTIGATIONAL USE ONLY, Disp: , Rfl:     levocetirizine (XYZAL) 5 MG tablet, Take 5 mg by mouth every evening., Disp: , Rfl:     magnesium oxide (MAG-OX) 400 mg (241.3 mg magnesium) tablet, Take 400 mg by mouth once daily., Disp: , Rfl:     melatonin 10 mg Cap, Take by mouth every  evening. (Patient not taking: Reported on 11/4/2024), Disp: , Rfl:     metoprolol succinate (TOPROL-XL) 25 MG 24 hr tablet, TAKE 12.5 MG (ONE-HALF TABLET) BY MOUTH EVERY DAY FOR CHRONIC HEART FAILURE, Disp: , Rfl:     pantoprazole (PROTONIX) 20 MG tablet, Take 20 mg by mouth once daily., Disp: , Rfl:     pantoprazole (PROTONIX) 40 MG tablet, 40 mg. (Patient not taking: Reported on 11/4/2024), Disp: , Rfl:     pregabalin (LYRICA) 150 MG capsule, Take 150 mg by mouth 2 (two) times daily., Disp: , Rfl:     senna (SENOKOT) 8.6 mg tablet, Take 8.6 mg by mouth., Disp: , Rfl:     spironolactone (ALDACTONE) 25 MG tablet, Take 25 mg by mouth., Disp: , Rfl:     traMADoL (ULTRAM) 50 mg tablet, Take 1 tablet (50 mg total) by mouth every 8 (eight) hours as needed for Pain., Disp: 42 tablet, Rfl: 0    triamcinolone acetonide 0.1% (KENALOG) 0.1 % cream, Apply topically 2 (two) times daily. prn, Disp: , Rfl:     valsartan (DIOVAN) 40 MG tablet, 40 mg., Disp: , Rfl:     zinc sulfate (ZINCATE) 50 mg zinc (220 mg) capsule, Take 1 capsule by mouth once daily., Disp: , Rfl:     Food/medication interactions noted: none noted    Vitamins/Supplements: already takes omega 3 and vitamin D supplements daily     Labs: Reviewed - followed by MD marshall    Nutrition Diagnosis    Problem: nutrition-related knowledge deficit  Etiology (related to):  surgery  Signs/Symptoms (as evidenced by): referral to prehab program in preparation for surgery    Nutrition Intervention    Nutrition Prescription   2190 kcals (20 kcal/kg)  88 g protein (0.8 g/kg)   2190 mL fluid (20 mL/kg)    Recommendations:  Make meals/snacks high in calories and protein - examples discussed  Limit sugar sweetened beverages and continue daily physical activity.   Consume at least 1 scoop of protein powder/day  Start vitamin D3 and omega-3 supplements    Materials Provided/Reviewed: Prehab materials    Nutrition Monitoring and Evaluation    Monitor: adherence to nutrition  recommendations, diet education needs, and weight status    Goals: weight maintenance    Follow up: Patient provided with contact information and advised to call/message with questions or to make future appointment if further intervention is needed.    Communication to referring provider/care team: note available in chart; discussed with MINDY Rojo NP    Counseling time: 20 minutes      Kristen Willis, MPH, RD, LDN  Clinical Dietitian  [facility]       This service was not originating from a related E/M service provided within the previous 7 days nor will  to an E/M service or procedure within the next 24 hours or my soonest available appointment.  Prevailing standard of care was able to be met in this audio-only visit.

## 2024-11-04 NOTE — PLAN OF CARE
[facility]  Physical Therapy Initial Evaluation  [facility]  [facility] Number[facility] [id number]    Therapy Diagnosis:   Encounter Diagnoses   Name Primary?    Spinal stenosis of lumbar region without neurogenic claudication     Balance problem Yes    Decreased functional mobility and endurance     Pain aggravated by activities of daily living         Physician: Emily Rojo APRN, A*    Physician Orders: PT Eval and Treat   Medical Diagnosis from Referral: M48.061 (ICD-10-CM) - Spinal stenosis of lumbar region without neurogenic claudication   Evaluation Date: 11/4/2024  Authorization Period Expiration: 12/31/2024  Plan of Care Expiration: 1/3/2025  Progress Note Due: 12/2/2024  Visit # / Visits authorized: 1/ 1   FOTO: TBA    Precautions: Standard, blood thinners, and cancer     Time In: 12:26 pm  Time Out: 1:10 pm  Total Appointment Time (timed & untimed codes): 44 minutes    Subjective   Date of onset: 2020  History of current condition - Fior reports: she has had trouble with her back for several years and this has been getting worse with time. She did have physical therapy for core strengthening after L4-5 MIS laminectomy in 10/2023. Due to her back pain she has trouble doing her usual housework as she is able to clean her room but other family members have to do the rest of the house. She has difficulty pulling weeds in her garden and it is hard to get up from the ground when gardening so she has to push herself up using a chair, due to increased pain in her knees when kneeling. Sometime she has to use her hands to pick her left leg up when getting into a vehicle, has to lean against the wall if standing to put on her pants, and has trouble with getting up from low surfaces. When it is warmer she does swim for exercise but has not been able to do that recently but will start riding her bike for exercise in her neighborhood.     Surgery and Date: 1/2025: tentatively  planning T10-pelvis fusion        Medical History:   Past Medical History:   Diagnosis Date    Arthritis     Bulging lumbar disc     Cancer     Osteoarthritis        Surgical History:   Fior Dc  has a past surgical history that includes Foot surgery; Knee surgery; Hip surgery; Back surgery; Shoulder surgery; Gastric bypass; breast aug (Bilateral); Abdominoplasty; Ganglion cyst excision; Hysterectomy; Appendectomy; Cholecystectomy; Lumbar laminectomy (N/A, 10/23/2023); and Total Reduction Mammoplasty.    Medications:   Fior has a current medication list which includes the following prescription(s): acetaminophen, ascorbic acid (vitamin c), aspirin, atorvastatin, baclofen, baclofen, calcium citrate-vitamin d3 315-200 mg, carboxymethylcellulose, clopidogrel, clotrimazole, collagen, elderberry fruit, empagliflozin, ergocalciferol (vitamin d2), ferrous gluconate, furosemide, inv folate, levocetirizine, magnesium oxide, melatonin, metoprolol succinate, pantoprazole, pantoprazole, pregabalin, senna, spironolactone, tramadol, triamcinolone acetonide 0.1%, valsartan, and zinc sulfate.    Allergies:   Review of patient's allergies indicates:   Allergen Reactions    Adhesive tape-silicones     Codeine      Has to take benadryl with      Flunisolide     Gabapentin     Hydrocodone Itching     Can take with benadryl    Oxycodone Itching     Can take with benadryl         Imaging, none: no recent imaging of lumbar spine available to PT    Prior Therapy: PT previously for B TKA, B JATINDER, back surgery, B RTC  Social History: single story home, no steps to enter; lives with their family  Occupation: retired  Prior Level of Function: was independent  Current Level of Function: Mod A with household duties, transportation  Exercise Routine prior to onset: no formal exercise program  Dominant hand:  right     Pain:  Current 5/10, worst 10/10, best 3/10   Location: centralized back   Description: Aching, Throbbing, and  stabbing  Aggravating Factors: Sitting, Standing, Bending, Walking, Lifting, and Getting out of bed/chair  Easing Factors: massage, pain medication, ice, hot bath, rest, and staying active    Functional Mobility (Bed mobility, transfers)  Bed mobility: I  Supine to sit: I  Sit to supine: I  Transfers to bed: I  Transfers to toilet: I  Sit to stand:  I  Stand pivot:  I  Car transfers: Mod I  Wheelchair mobility: N/A    ADL's:  Feeding: I  Grooming: I  Hygiene: I  UB Dressing: I  LB Dressing: Mod I  Toileting: I  Bathing: I    Pts goals: To get my back fixed so I can function again, I have been waiting for the surgery for 2.5 years      Objective     Lab Values 10/24/24  Hemoglobin: 11.3(L)  Hematocrit: 36.2(L)  Platelets: 228  ANC: 6.8        Vitals: B/P: 96/53; HR: 58; SpO2: 96%    Mental status: alert, oriented to person, place, and time, normal mood, behavior, speech, dress, motor activity, and thought processes, affect appropriate to mood  Appearance: Casually dressed and Showered  Behavior:  calm, cooperative, and adequate rapport can be established  Attention Span and Concentration:  Normal    Postural examination/scapula alignment: Rounded shoulder, Head forward, Decreased lordosis, and flat back posture, scoliosis in thoracic spine      Sensation:   Light Touch UEs  Intact  Light Touch LEs  Impaired: B feet from ball to toes  Proprioception: Intact,            ROM:     Active/Passive ROM: (measured in degrees)    B UE and LE AROM WFL    Lumbar Range of Motion:    Degrees Pain   Flexion 80    Extension 20 B lumbar region   Left Side Bending 10    Right Side Bending 15 R hip     Special Tests:  -Repeated Flexion: increased low back pain  -Repeated Ext: increased low back pain    Palpation: R SI joint,B iliac crest    Strength: manual muscle test grades below     Upper Extremity Strength  (R) UE  (L) UE    Shoulder flexion: 3+/5 Shoulder flexion: 3+/5   Shoulder Abduction: 4+/5 Shoulder abduction: 4+/5    Shoulder ER 4/5 Shoulder ER 4-/5   Shoulder IR 5/5 Shoulder IR 5/5   Elbow flexion: 5/5 Elbow flexion: 5/5   Elbow extension: 5/5 Elbow extension: 5/5   Wrist flexion: 5/5 Wrist flexion: 5/5   Wrist extension: 5/5 Wrist extension: 5/5         Lower Extremity Strength  Right LE  Left LE    Hip Flexion: 5/5 Hip Flexion: 5/5   Hip Extension:  4+/5 Hip Extension: 4+/5   Hip Abduction: 5/5 Hip Abduction: 5/5   Hip Adduction: 5/5 Hip Adduction 5/5   Knee Extension: 4+/5 Knee Extension: 4+/5   Knee Flexion: 5/5 Knee Flexion: 5/5   Ankle Dorsiflexion: 5/5 Ankle Dorsiflexion: 5/5   Ankle Plantarflexion: 5/5 Ankle Plantarflexion: 5/5         Special Tests      Strength (in pounds, setting II one maximum trial):   Right Left    II 55.1lbs 51.5lbs     Normal  Average Values  Age: Female: Right Left Male: Right Left   20-29 66 lbs 62 lbs         94 lbs 86 lbs   30-39 68 lbs 64 lbs 90 lbs 82 lbs   40-49 64 lbs 62 lbs 80 lbs 74 lbs   50-59 62 lbs 57 lbs 72 lbs 65 lbs   60-69 53 lbs 51 lbs 63 lbs 56 lbs   70+ 44 lbs 42 lbs 54 lbs 48 lbs       Balance Assessment:     Evaluation   Single Limb Stance R LE 0.80 sec  (<10 sec = HIGH FALL RISK)   Single Limb Stance L LE 4.18 sec  (<10 sec = HIGH FALL RISK)     Normative Values for Single Leg Stance, Eyes Open   Age: Time:   60-69 27 sec   70-79 17.2 sec   80-89 8.5 sec        Evaluation   Timed Up and Go 10.84 sec     >14 seconds associated with high fall risk  > 30 seconds predictive of requiring ambulation device & being dependent in ADLs    Table: Population Norms for TUG    Age  Average TUG    60 - 69 years  8.1 seconds    70 - 79 years  9.2 seconds    80 - 99 years  11.3 seconds        Endurance Assessment:    6 Minute Walk Test Distance in meters: 184.89,stopped with 2 minutes 05 seconds left  - AD used: none  - Assistance: none    Normative Values for 6 minute Walk Test, Distance in Meters:  Age in Years Men Women   60-64 558-672m 498-604m   65-69 512-640m 457-581m    70-74 498-622m 439-562m   75-79 430-585m 393-535m   80-84 407-575m 352-494m   85-89 427-521m 311-466m   90-84 279-457m 251-402m          Evaluation   30 second Chair Rise  (adults > 61 y/o) 6 completed with no arms*   *pain in R quad, lumbar region    Normative Scores for 30 sec Chair Rise (arms across chest; full stand and full sitting)   60-64 65-69 70-74 75-79 80-84 85-89 90-94   Range for Men 14-19 12-18 12-17 11-17 10-15 8-14 7-12   Range for Women 12-17 11-16 10-16 10-15 9-14 8-13 4-11       Gait Assessment/Deviations:  Fior displays the following deviations with ambulation: wide BRIDGETT, forward flexed trunk    Impairments contributing to deviations: impaired motor control, pain in low back, decreased balance      Intake Outcome Measure for FOTO Lumbar Survey    Therapist reviewed FOTO scores for Fior Madrid Vanda on 11/4/2024.   FOTO documents entered into TUBE - see Media section.    Intake Score: TBA           TREATMENT     Total Treatment time separate from Evaluation: 3 minutes    Fior received therapeutic exercises to develop strength and endurance for 3 minutes including:  Patient encouraged to resume biking to improve her endurance and resume her HEP from her previous episode of physical therapy for her low back.       Home Exercises and Patient Education Provided    Education provided:   - Role of physical therapy in multi-disciplinary team  - Goals for physical therapy, scheduling  - Home exercise program, exercise technique  - Energy conservation techniques     Written Home Exercises Provided: Patient instructed to cont prior HEP.    Assessment   Fior is a 64 y.o. female referred to outpatient Physical Therapy with a medical diagnosis of Spinal stenosis of lumbar region without neurogenic claudication. Pt presents with decreased endurance, decreased balance, and decreased functional mobility. Due to her decreased endurance and balance she has difficulty performing her usual ADLs, transfers  from low surfaces, and recreational activities.    Pt prognosis is Good.   Pt will benefit from skilled outpatient Physical Therapy to address the deficits stated above and in the chart below, provide pt/family education, and to maximize pt's level of independence.     Plan of care discussed with patient: Yes  Pt's spiritual, cultural and educational needs considered and patient is agreeable to the plan of care and goals as stated below:     Anticipated Barriers for therapy: none anticipated    Medical Necessity is demonstrated by the following  History  Co-morbidities and personal factors that may impact the plan of care [] LOW: no personal factors / co-morbidities  [] MODERATE: 1-2 personal factors / co-morbidities  [x] HIGH: 3+ personal factors / co-morbidities    Moderate / High Support Documentation:   Co-morbidities affecting plan of care: history of cancer, depression, high BMI    Personal Factors:   no deficits     Examination  Body Structures and Functions, activity limitations and participation restrictions that may impact the plan of care [] LOW: addressing 1-2 elements  [] MODERATE: 3+ elements  [x] HIGH: 4+ elements (please support below)    Moderate / High Support Documentation: decreased balance, decreased endurance, difficulty with transfers from low surfaces, difficulty performing usual household duties and yard work     Clinical Presentation [] LOW: stable  [x] MODERATE: Evolving  [] HIGH: Unstable     Decision Making/ Complexity Score: moderate         Goals:  Short Term Goals:  4 weeks  1. Pt to demonstrate increased strength in bilateral upper extremities to 4/5 in order to perform functional activities. (progressing, not met)  2. Pt. will improve Single Lomb Stance test score to 15 seconds each LE in order to perform safe transfers and for patient to be in low/moderate risk for falls category (progressing, not met)  3. Pt will improve 6 minute walk test score to 210 meters in order to ambulate  safely in community (progressing, not met)  4. Pt will initiate home exercise program to improve strength, flexibility, endurance, mobility & balance to return pt to PLOF (progressing, not met)  5. Pt will tolerate 10 min or greater of time in light-->moderate intensity cardio (I.e. Bike, NuStep) to improve endurance. (progressing, not met)    Long Term Goals: 8 weeks  1. Pt will increase strength in bilateral lower extremities to 5/5 for improved functional mobility and balance. (progressing, not met)  2. Pt will be independent with HEP to improve ROM, strength, balance,  and independence with ADL's (progressing, not met)  3. Pt. will improve Single Limb Stance test score to 30 seconds each LE in order to ambulate safely in community and for patient to be in low risk for falls category (progressing, not met)  4. Pt. will improve 6 minute walk test score to 250 meters in order to transfer independently and for patient to be in low risk for falls category (progressing, not met)  5. Patient will report compliance with an aerobic conditioning program 5x week for 10 -30min each day to improve overall cardiovascular function and decrease cancer related fatigue at discharge. (progressing, not met)    Plan   Plan of care Certification: 11/4/2024 to 1/3/2025.    Outpatient Physical Therapy 2 times weekly for 8 weeks to include the following interventions: Manual Therapy, Moist Heat/ Ice, Neuromuscular Re-ed, Patient Education, Self Care, Therapeutic Activities, and Therapeutic Exercise.     Clau Mcmillan, PT
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
## 2024-11-06 ENCOUNTER — TELEPHONE (OUTPATIENT)
Dept: PSYCHIATRY | Facility: CLINIC | Age: 64
End: 2024-11-06
Payer: OTHER GOVERNMENT

## 2024-11-06 PROBLEM — Z74.09 DECREASED FUNCTIONAL MOBILITY AND ENDURANCE: Status: ACTIVE | Noted: 2024-11-06

## 2024-11-06 PROBLEM — R52 PAIN AGGRAVATED BY ACTIVITIES OF DAILY LIVING: Status: ACTIVE | Noted: 2024-11-06

## 2024-11-06 PROBLEM — R26.89 BALANCE PROBLEM: Status: ACTIVE | Noted: 2024-11-06

## 2024-11-06 NOTE — TELEPHONE ENCOUNTER
Attempted to call patient to let her know of [facility]'s smoking cessation program in case she is interested. Patient did not answer, thus a voicemail was left noting [facility]'s smoking cessation program and phone number for cancer center in case she is interested and would like a referral to be placed at this time.      Shazia London.ERIC.  Clinical Psychologist  [place] License #[id number]  [place] License #[id number]

## 2024-11-18 NOTE — PROGRESS NOTES
The patient location is: home  The chief complaint leading to consultation is: prehab f/u     Visit type: audiovisual    Face to Face time with patient: 23 minutes  26 minutes of total time spent on the encounter, which includes face to face time and non-face to face time preparing to see the patient (eg, review of tests), Obtaining and/or reviewing separately obtained history, Documenting clinical information in the electronic or other health record, Independently interpreting results (not separately reported) and communicating results to the patient/family/caregiver, or Care coordination (not separately reported).     Each patient to whom he or she provides medical services by telemedicine is:  (1) informed of the relationship between the physician and patient and the respective role of any other health care provider with respect to management of the patient; and (2) notified that he or she may decline to receive medical services by telemedicine and may withdraw from such care at any time.        Prehabilitation Clinic Follow-Up    Ms. Fior Dc is a 64 y.o. female who presented to the [facility] for prehabilitation. Initial prehab assessments were done 11/4/2024, as a referral from Dr. Coe. [facility], prior  service.  This is the 1st follow-up appointment.      Past History:   Hx of CLL, s/p bilateral hip and knee replacements  2020: progressively worsening consistent lower back pain  10/2023: s/p L4-5 MIS laminectomy   3/2024: s/p cardiac stent placement     3/2025: tentatively planning T10-pelvis fusion     SUBJECTIVE:     C/o chronic back pain. States she participated in PT previously without any relief; massage therapy has been the most effective therapy she has done but limited to only 12 sessions/ year per her [facility] insurance.    She continues to drive, run errands, is capable of her ADLs and helps with household chores. She enjoys gardening and outdoors.   Hx of gastric  bypass so states she is familiar with high protein diet, eats protein at all meals. Reports a good appetite, tolerating oral diet without N/V. C/o constipation.  Continues to vape   Remains on Plavix    ASSESSMENTS:     Compliance    Outpatient PT?  YES, scheduled to start this Friday  HEP? YES  IMT? NO 10 breaths TID  Protein Supplement? NO  Vitamin-D3? YES  Omega-3? YES  Smoking?  YES    3/2024: A1c 5.7%  8/2024: alb 4.3    Initial PT assessment:   = WNL  Single limb stance, TUG, 30 sec chair rise  = below average  Unable to complete 6 MWT, stopped after 4 minutes    MANAGEMENT:       1. Spinal stenosis of lumbar region without neurogenic claudication        2. Decreased functional mobility and endurance            Treatment Plan    1) Patient is going to start IMT (Inspiratory Muscle Training) to prevent pneumonia.   2) Patient is not going to start 1 scoop whey protein daily.  She is saving for the month prior to surgery.   3) Patient is going to continue 2000 IU Vitamin D 1 time(s) daily.    4) Patient is going to continue Omega-3s daily.   5) Patient is going to continue physical therapy  6) Patient is not going to continue dietitian  7) Patient is going to cease all tobacco/ nicotine use 6 weeks prior to surgery. Not interested in formal smoking cessation program.     Please schedule prehab VV f/u in 3-4 weeks.           Nurse Practitioner, Prehabilitation Program  Surgical Oncology and General Surgery  [facility] [address]  [address], [place] [address]    Tel [phone]  Fax [phone]

## 2024-11-19 ENCOUNTER — OFFICE VISIT (OUTPATIENT)
Dept: SURGERY | Facility: CLINIC | Age: 64
End: 2024-11-19
Payer: OTHER GOVERNMENT

## 2024-11-19 DIAGNOSIS — M48.061 SPINAL STENOSIS OF LUMBAR REGION WITHOUT NEUROGENIC CLAUDICATION: Primary | ICD-10-CM

## 2024-11-19 DIAGNOSIS — Z74.09 DECREASED FUNCTIONAL MOBILITY AND ENDURANCE: ICD-10-CM

## 2024-11-19 PROCEDURE — 99212 OFFICE O/P EST SF 10 MIN: CPT | Mod: 95,,, | Performed by: NURSE PRACTITIONER

## 2024-12-17 ENCOUNTER — OFFICE VISIT (OUTPATIENT)
Dept: SURGERY | Facility: CLINIC | Age: 64
End: 2024-12-17
Payer: OTHER GOVERNMENT

## 2024-12-17 DIAGNOSIS — R26.89 BALANCE PROBLEM: ICD-10-CM

## 2024-12-17 DIAGNOSIS — R52 PAIN AGGRAVATED BY ACTIVITIES OF DAILY LIVING: ICD-10-CM

## 2024-12-17 DIAGNOSIS — Z74.09 DECREASED FUNCTIONAL MOBILITY AND ENDURANCE: ICD-10-CM

## 2024-12-17 DIAGNOSIS — M48.061 SPINAL STENOSIS OF LUMBAR REGION WITHOUT NEUROGENIC CLAUDICATION: Primary | ICD-10-CM

## 2024-12-17 PROCEDURE — 99213 OFFICE O/P EST LOW 20 MIN: CPT | Mod: 95,,, | Performed by: NURSE PRACTITIONER

## 2024-12-17 NOTE — PROGRESS NOTES
The patient location is: home  The chief complaint leading to consultation is: prehab f/u     Visit type: audiovisual    Face to Face time with patient: 16 minutes  24 minutes of total time spent on the encounter, which includes face to face time and non-face to face time preparing to see the patient (eg, review of tests), Obtaining and/or reviewing separately obtained history, Documenting clinical information in the electronic or other health record, Independently interpreting results (not separately reported) and communicating results to the patient/family/caregiver, or Care coordination (not separately reported).     Each patient to whom he or she provides medical services by telemedicine is:  (1) informed of the relationship between the physician and patient and the respective role of any other health care provider with respect to management of the patient; and (2) notified that he or she may decline to receive medical services by telemedicine and may withdraw from such care at any time.        Prehabilitation Clinic Follow-Up    Ms. Fior Dc is a 64 y.o. female who presented to the [facility] for prehabilitation. Initial prehab assessments were done 11/4/2024, as a referral from Dr. Coe. She has [facility] benefits, prior  service.  This is the 1st follow-up appointment.    This is the 2nd follow-up appointment.      Past History:   Hx of CLL, s/p bilateral hip and knee replacements  2020: progressively worsening consistent lower back pain  10/2023: s/p L4-5 MIS laminectomy   11/8/2024: s/p throat biopsy  3/2024: s/p cardiac stent placement     3/2025: tentatively planning T10-pelvis fusion   SUBJECTIVE:   She is planning to visit family over the holidays. C/o chronic back pain - no relief. She continues to drive, run errands, is capable of her ADLs and helps with household chores. She enjoys gardening and outdoors.    Hx of gastric bypass so states she is familiar with high protein diet,  eats protein at all meals. Reports a good appetite, tolerating oral diet without N/V. C/o constipation. Admits to more baking during holidays.  Continues to vape   Remains on Plavix, stating her colonoscopy had to be rescheduled r/t blood thinner.   C/o HA, which she is monitoring BP at home. [facility]     ASSESSMENTS:     Compliance    Outpatient PT? 1 session 11/22/24;  Waiting to schedule additional sessions until she returns home from visiting family over holidays  HEP? YES  IMT? NO   Protein? NOT started protein supplement, she is waiting until 1 month before surgery.   Vitamin-D3? YES  Omega-3? YES  Smoking?  YES vaping    3/2024: A1c 5.7%  8/2024: alb 4.3     Initial PT assessment:   = WNL  Single limb stance, TUG, 30 sec chair rise  = below average  Unable to complete 6 MWT, stopped after 4 minutes    MANAGEMENT:     1. Spinal stenosis of lumbar region without neurogenic claudication        2. Decreased functional mobility and endurance        3. Pain aggravated by activities of daily living        4. Balance problem            Treatment Plan    1) Patient is going to re- start IMT (Inspiratory Muscle Training) after we discussed proper technique. Instructed to perform 10 breaths at least BID, if not TID.   2) Patient is NOT going to start 1 scoop whey protein daily until closer to surgery date.   3) Patient is going to continue 2000 IU Vitamin D 1 time(s) daily.    4) Patient is going to continue Omega-3s daily.   5) Patient is going to start physical therapy next year. She is interested in massage therapy + acupuncture as these have previously been effective.   6) Patient is not going to continue dietitian  7) Patient is going to cease all tobacco/ nicotine use 6 weeks prior to surgery. Not interested in formal smoking cessation program.     Return to clinic on 1/16/25 to coordinate with other [facility] [place].         Nurse Practitioner,  Prehabilitation Program  Surgical Oncology and General Surgery  [facility]  [address]  [address], [place] [address]    Tel [phone]  Fax [phone]

## 2024-12-17 NOTE — Clinical Note
Return to clinic for in person prehab f/u on 1/16/25 to coordinate with other [facility].

## 2025-01-02 ENCOUNTER — TELEPHONE (OUTPATIENT)
Dept: SURGERY | Facility: CLINIC | Age: 65
End: 2025-01-02
Payer: OTHER GOVERNMENT

## 2025-01-02 NOTE — TELEPHONE ENCOUNTER
----- Message from MIESHA Kennedy,ANP-C sent at 1/2/2025  3:15 PM CST -----  Return to clinic for in person prehab f/u on 1/16/25 to coordinate with other [facility] appt.

## 2025-01-02 NOTE — TELEPHONE ENCOUNTER
Call placed to pt to schedule prehab f/u. Left message with notification appt will be scheduled on 1/16/2025 to coordinate with other appt, to check appt details on patient portal and jesus the office back with any scheduling conflicts. Call back number provided.

## 2025-01-09 ENCOUNTER — TELEPHONE (OUTPATIENT)
Dept: SURGERY | Facility: CLINIC | Age: 65
End: 2025-01-09
Payer: OTHER GOVERNMENT

## 2025-01-09 ENCOUNTER — OFFICE VISIT (OUTPATIENT)
Dept: NEUROSURGERY | Facility: CLINIC | Age: 65
End: 2025-01-09
Payer: OTHER GOVERNMENT

## 2025-01-09 VITALS — DIASTOLIC BLOOD PRESSURE: 82 MMHG | SYSTOLIC BLOOD PRESSURE: 129 MMHG | HEART RATE: 61 BPM

## 2025-01-09 DIAGNOSIS — M48.062 LUMBAR STENOSIS WITH NEUROGENIC CLAUDICATION: Primary | ICD-10-CM

## 2025-01-09 DIAGNOSIS — H93.A9 PULSATILE TINNITUS, UNSPECIFIED EAR: ICD-10-CM

## 2025-01-09 PROCEDURE — 99999 PR PBB SHADOW E&M-EST. PATIENT-LVL III: CPT | Mod: PBBFAC,,, | Performed by: PHYSICIAN ASSISTANT

## 2025-01-09 PROCEDURE — 99213 OFFICE O/P EST LOW 20 MIN: CPT | Mod: PBBFAC | Performed by: PHYSICIAN ASSISTANT

## 2025-01-09 PROCEDURE — 99215 OFFICE O/P EST HI 40 MIN: CPT | Mod: S$PBB,,, | Performed by: PHYSICIAN ASSISTANT

## 2025-01-09 NOTE — TELEPHONE ENCOUNTER
Returned call. Spoke to pt. Pt requesting to reschedule her appt on 1/16 to a VV since her other appt on the same day rescheduled. Appt changed to VV per request. Details provided. Pt verbalized understanding.

## 2025-01-09 NOTE — PROGRESS NOTES
Dear Administration, Veterans,    Thank you for referring this patient to my clinic. The full details of my evaluation will also be forthcoming to you by letter.    CHIEF COMPLAINT:  Follow up    I, Thalia Luong, attest that this documentation has been prepared under the direction and in the presence of Sharad Coe MD.    HPI from 10/24/2024:  Patient presents for follow-up of low back pain. S/p L4-5 MIS laminectomy with Dr. Walters 10/23/23. Previous decompression at L5-S1 at OSH. Progressively worsening since 2020. She has tried L5-S1 facet injections, PT, and massage therapy without relief. The pain is constant. Occasional neck stiffness. Denies arm pain, leg pain, b/b dysfunction, hand clumsiness. She walks flexed forward as she is unable to stand upright without pain. She reports intermittent foot numbness following surgeries in both feet in the early 2000s. She underwent cardiac stent placement 3/2024 and is on plavix now. She states her cardiologist won't clear her until 1 year post-op to undergo spine fusion. She also uses nicotine but says she will quit in order to have surgery. [facility]     Interval Hx:   Today the patient presents to clinic to discuss surgery. Denies changes in symptoms since last visit. Continues to report debilitating back pain. She cannot walk long distances or stand for long periods of time without severe pain. She's been participating in pre-hab since the last visit. She denies any bowel/bladder issues.     Patient denies any other complaints at this time.    Review of patient's allergies indicates:   Allergen Reactions    Adhesive tape-silicones     Codeine      Has to take benadryl with      Flunisolide     Gabapentin     Hydrocodone Itching     Can take with benadryl    Oxycodone Itching     Can take with benadryl        Past Medical History:   Diagnosis Date    Arthritis     Bulging lumbar disc     Cancer     Osteoarthritis      Past  Surgical History:   Procedure Laterality Date    ABDOMINOPLASTY      APPENDECTOMY      BACK SURGERY      breast aug Bilateral     CHOLECYSTECTOMY      FOOT SURGERY      GANGLION CYST EXCISION      GASTRIC BYPASS      HIP SURGERY      HYSTERECTOMY      KNEE SURGERY      LUMBAR LAMINECTOMY N/A 10/23/2023    Procedure: MINIMALLY INVASIVE LAMINECTOMY, SPINE, LUMBAR L4-5;  Surgeon: Adam Walters MD;  Location: [facility] OR [address] [place] [address];  Service: Neurosurgery;  Laterality: N/A;    SHOULDER SURGERY      TOTAL REDUCTION MAMMOPLASTY       Family History   Problem Relation Name Age of Onset    Hypertension Mother      Hypertension Father      Diabetes Father      Cancer Father       Social History     Tobacco Use    Smoking status: Every Day     Current packs/day: 0.50     Average packs/day: 0.5 packs/day for 1.7 years (0.8 ttl pk-yrs)     Types: Vaping with nicotine, Cigarettes     Start date: 4/1/2023    Smokeless tobacco: Never   Substance Use Topics    Alcohol use: Yes    Drug use: Never        Review of Systems   All other systems reviewed and are negative.      OBJECTIVE:   Vital Signs:  Pulse: 61 (01/09/25 1502)  BP: 129/82 (01/09/25 1502)    Physical Exam:  General: well developed, well nourished, no distress.   Head: normocephalic, atraumatic  Neurologic: Alert and oriented. Thought content appropriate.  GCS: Motor: 6/Verbal: 5/Eyes: 4 GCS Total: 15  Mental Status: Awake, Alert, Oriented x 4  Language: No aphasia  Speech: No dysarthria  Cranial nerves: face symmetric, tongue midline, CN II-XII grossly intact.   Eyes: pupils equal, round, reactive to light with accomodation, EOMI.  Pulmonary: normal respirations, no signs of respiratory distress  Sensory: intact to light touch throughout  Motor Strength: Moves all extremities spontaneously with good tone.  Full strength upper and lower extremities. No abnormal movements seen.      Strength   Deltoids Triceps Biceps Wrist Extension Wrist Flexion Hand    Upper: R 5/5 5/5  5/5 5/5 5/5 5/5     L 5/5 5/5 5/5 5/5 5/5 5/5       Iliopsoas Quadriceps Knee  Flexion Tibialis  anterior Gastro- cnemius EHL   Lower: R 5/5 5/5 5/5 5/5 5/5 5/5     L 5/5 5/5 5/5 5/5 5/5 5/5      Mcduffie: absent  Clonus: absent  Skin: Skin is warm, dry and intact.  Gait: antalgic 2/2 pain    Diagnostic Results:  All imaging was independently reviewed by me.    MRI lumbar spine, dated 11/22/2024:  1. Severe multilevel foraminal stenosis L2-5.   2. Moderate canal stenosis L2-3 and L4-5.     ASSESSMENT/PLAN:     Fior Dc has  flat back deformity and lumbar stenosis with neurogenic claudication. Neuro exam stable. She has failed conservative management and would like to discuss surgical options. Seen today by Dr. Coe. Recommending [id number] fusion. The risks, benefits and alternatives to the proposed operation were discussed in detail between the patient and surgeon. Risks discussed include but are not limited to: bleeding, infection, pain, scarring, spinal fluid leak, injury to the spinal cord or nerves, injury to the blood vessels, stroke, heart attack, blood clots, malpositioning or failure of hardware, failure of fusion, pseudoarthrosis, adjacent level disease, no improvement or worsening of symptoms, need for more surgery, death. All questions were answered, and the patient wishes to proceed with surgery. No guarantees about the results of the procedure were made.     Patient has a hx of left craniotomy and has a skull defect. Reported new right pulsatile tinnitus at today's visit. Will obtain imaging for further evaluation. CTH, MRA and MRV brain ordered.     She had cardiac stents placed in March/April 2024 and is on Plavix. [facility], she will likely be cleared for surgery at 1 year post-op to hold her antiplatelet therapy. This will need to be held 1 week pre-op and 1 week post-op. She has follow-up with them at the end of this month to discuss clearance. She will need  hematology clearance prior to surgery given her history of PLL.     Counseled on nicotine cessation. Will obtain nicotine testing prior to surgery.    We discussed concerning signs and symptoms that would prompt return to clinic or urgent medical attention, patient v/u. All questions answered. Encouraged to call the clinic with questions/concerns prior to the next visit.       - T10-pelvis  - CTH, MRA brain, MRV brain  - nicotine testing    Connie Epperson PA-C  Neurosurgery  [facility]

## 2025-01-09 NOTE — TELEPHONE ENCOUNTER
----- Message from Johana sent at 1/9/2025  1:25 PM CST -----  Name of Who is Calling: RENETTA CHIANG [[id number]]      What is the request in detail:  requesting to change visit for 01/16 to a audio call visit. Please advise       Can the clinic reply by [facility]: no       What Number to Call Back [facility] not [facility]: Telephone Information:  Mobile          [phone]

## 2025-01-13 ENCOUNTER — TELEPHONE (OUTPATIENT)
Dept: NEUROSURGERY | Facility: CLINIC | Age: 65
End: 2025-01-13
Payer: OTHER GOVERNMENT

## 2025-01-13 DIAGNOSIS — M43.8X9 SAGITTAL PLANE IMBALANCE: Primary | ICD-10-CM

## 2025-01-15 NOTE — PROGRESS NOTES
The patient location is: home  The chief complaint leading to consultation is: prehab f/u     Visit type: audiovisual    Face to Face time with patient: 12 minutes  18 minutes of total time spent on the encounter, which includes face to face time and non-face to face time preparing to see the patient (eg, review of tests), Obtaining and/or reviewing separately obtained history, Documenting clinical information in the electronic or other health record, Independently interpreting results (not separately reported) and communicating results to the patient/family/caregiver, or Care coordination (not separately reported).     Each patient to whom he or she provides medical services by telemedicine is:  (1) informed of the relationship between the physician and patient and the respective role of any other health care provider with respect to management of the patient; and (2) notified that he or she may decline to receive medical services by telemedicine and may withdraw from such care at any time.        Prehabilitation Clinic Follow-Up    Ms. Fior Dc is a 64 y.o. female who presented to the [facility] for prehabilitation. Initial prehab assessments were done 11/4/2024, as a referral from Dr. Coe. She has [facility], prior  service.   This is the 3rd follow-up appointment.      Past History:   Hx of CLL, s/p bilateral hip and knee replacements  2020: progressively worsening consistent lower back pain  10/2023: s/p L4-5 MIS laminectomy   11/8/2024: s/p throat biopsy  3/2024: s/p cardiac stent placement     3/11/2025: tentatively  T10-pelvis fusion   SUBJECTIVE:     C/o chronic back pain, she resumed PT for massage therapy which has been effective for her. She is entitled to receive 12 session in the next 180 days, so she goes once a week. She continues to drive, run errands, is capable of her ADLs and helps with household chores. Admits to increase activity over the holidays, while visiting  family.   Hx of gastric bypass so states she is familiar with high protein diet, eats protein at all meals. Reports a good appetite, tolerating oral diet without N/V. C/o constipation.   Continues to vape, down to only in the mornings with coffee.   Remains on Plavix    ASSESSMENTS:     Compliance  Outpatient PT?  YES 1 session 11/22/24, now going for massage therapy Qweek   HEP? YES  IMT? YES 10 breaths TID  Protein? YES  Vitamin-D3? YES  Omega-3? YES  Smoking?  YES    3/2024: A1c 5.7%  8/2024: alb 4.3     Initial PT assessment:   = WNL  Single limb stance, TUG, 30 sec chair rise  = below average  Unable to complete 6 MWT, stopped after 4 minutes    MANAGEMENT:     Patient Is not on track to proceed with surgery as she continues to vape. Otherwise, her performance status has been optimized.     1. Degeneration of intervertebral disc of lumbar region with discogenic back pain and lower extremity pain        2. Pain aggravated by activities of daily living        3. Decreased functional mobility and endurance          Treatment Plan    1) Patient is going to continue IMT (Inspiratory Muscle Training) to prevent pneumonia.   2) Patient is going to start 1 scoop whey protein daily.   3) Patient is going to continue 2000 IU Vitamin D 1 time(s) daily.    4) Patient is going to continue Omega-3s daily.   5) Patient is going to continue physical therapy  6) Patient is not going to continue dietitian  7) Patient is going to cease all tobacco/ nicotine, target quit date 2/1/25. Not interested in formal smoking cessation program         Nurse Practitioner, Prehabilitation Program  Surgical Oncology and General Surgery  [facility] [address]  [address], [place] [address]    Tel [phone]  Fax [phone]

## 2025-01-16 ENCOUNTER — OFFICE VISIT (OUTPATIENT)
Dept: SURGERY | Facility: CLINIC | Age: 65
End: 2025-01-16
Payer: OTHER GOVERNMENT

## 2025-01-16 DIAGNOSIS — M51.362 DEGENERATION OF INTERVERTEBRAL DISC OF LUMBAR REGION WITH DISCOGENIC BACK PAIN AND LOWER EXTREMITY PAIN: Primary | ICD-10-CM

## 2025-01-16 DIAGNOSIS — R52 PAIN AGGRAVATED BY ACTIVITIES OF DAILY LIVING: ICD-10-CM

## 2025-01-16 DIAGNOSIS — Z74.09 DECREASED FUNCTIONAL MOBILITY AND ENDURANCE: ICD-10-CM

## 2025-01-17 DIAGNOSIS — N64.9 DISORDER OF BREAST: Primary | ICD-10-CM

## 2025-02-05 DIAGNOSIS — Z01.818 PREOPERATIVE TESTING: Primary | ICD-10-CM

## 2025-02-05 RX ORDER — GOLDENSEAL 350 MG
CAPSULE ORAL DAILY
COMMUNITY
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
## 2025-02-05 NOTE — PRE-PROCEDURE INSTRUCTIONS
Patient stated has not had any problem with anesthesia in the past. Will need medical clearance from your PCP, Dr. Matias Downs. Sh said she has an appt on 2/24. Will need poc appt, labs, ua, and EKG. Our  will call to set up these appts. Will need cardiac clearance from your Cardiologist, Dr. Sewell ( female). She will make an appt. I will ask  for ASA 81 instructions.        Preop instructions given. Hold other aspirin containing products, nsaids( Aleve, Advil, Motrin, Ibuprofen, Naprosyn, Naproxen, Voltaren, Diclofenac, Mobic, Meloxicam, Celebrex, Celecoxib), vitamins( C, D ) and supplements ( Probiotic, Apple cider vinegar, Zinc) one week prior to surgery.    May take Tylenol Await instruction from Dr. Sewell for Aspirin 81 mg. ( Also sent to [facility] portal)  Verbalizes understanding.

## 2025-02-25 NOTE — PRE-PROCEDURE INSTRUCTIONS
Spoke to patient and told her needed medical clearance and cardiac clearance.She saiod she has letters for each.I gave her my fax number. She said she would fax them tomorrow. Verbalizes understanding.

## 2025-02-26 NOTE — PRE-PROCEDURE INSTRUCTIONS
Patient called to see if can see medical clearance in her chart. I told her I see the medical clearance,  Cardiology note from 1/27 by  and ASA instructions in media. In his note it says she will need a letter for cardiac clearance within one month of surgery.I asked if she could get that from her Cardiologist and have them fax it to [facility]. She has my fax number.Verbalizes understanding.

## 2025-02-27 ENCOUNTER — TELEPHONE (OUTPATIENT)
Dept: PREADMISSION TESTING | Facility: HOSPITAL | Age: 65
End: 2025-02-27
Payer: OTHER GOVERNMENT

## 2025-02-27 ENCOUNTER — TELEPHONE (OUTPATIENT)
Dept: NEUROSURGERY | Facility: HOSPITAL | Age: 65
End: 2025-02-27
Payer: OTHER GOVERNMENT

## 2025-02-27 NOTE — TELEPHONE ENCOUNTER
----- Message from BRYAN Cartagena sent at 2/27/2025  1:43 PM CST -----  Regarding: RE: Urgent Callback  Contact: [phone]  Renzo Beach, per Dr. Coe he is fine with her continuing the 81mg ASA throughout avani surgery period.  He did order 2 units of platelets to be in the surgery room which I had asked earlene to add to the surgery case request.  Do you know if blood bank needs to be notified or is there something else we need to do?Angelo  ----- Message -----  From: Baylee Cerrato RN  Sent: 2/26/2025   7:59 AM CST  To: Baylee Cerrato RN; Mitzi Tom RN  Subject: RE: Urgent Callback                              Note from Yvette Ty, Cardiologist from 1/27 with ASA 81 instructions to continue ASA unless is unable to undergo surgery on ASA then would stop 7 days prior to surgery and resume as soon as safe from a surgical perspective. Will Saravanan proceed on ASA 81 or not?Please let me know.Thanks!  ----- Message -----  From: Mitzi Tom RN  Sent: 2/25/2025   3:06 PM CST  To: Baylee Cerrato RN; Mitzi Tom RN; Tiana Res#  Subject: FW: Urgent Callback                              Renzo Beach, I spoke with pt.   We didn't get anything either, however, tiana looked in media mgr and it appears someone scanned in a surgical release  on 2/17/25.  Can you review to see if that's all she needs and let me know please.   She does have an upcoming appt with janine, however she wanted to be sure we rec'd her surgical release that was sent.Future Appointments3/6/2025   11:00 AM   Neal, Nurse Janine,# [facility] S1 PADM        [facility]   12:00 PM   LAB, APPOINTMENT [place] V[facility]        [facility]/6[facility]25   12:05 PM   LAB[facility] [place]3/6/2025   12:45 PM   EKG, APPT                  [facility] EKG            [facility]   10[facility]30 AM   [facility] MAMMO1                [facility] MAMMO          [facility]/7/2025   11:00 AM   B[facility] USOP3                 BAP USOUNDO         [facility]  ----- Message -----  From: Ashkan Modi  Sent: 2/25/2025   2:36 PM CST  To: Saravanan Orourke Staff  Subject: Urgent Callback                                  Patient calling requesting a callback from nurse or provider in regards to finding out if  surgical clearance was received. She said the anesthesia department does not see it in the system. Please call back as soon as possible.

## 2025-02-27 NOTE — TELEPHONE ENCOUNTER
----- Message from BRYAN Cartagena sent at 2/27/2025  1:43 PM CST -----  Regarding: RE: Urgent Callback  Contact: [phone]  Renzo Beach, per Dr. Coe he is fine with her continuing the 81mg ASA throughout avani surgery period.  He did order 2 units of platelets to be in the surgery room which I had asked earlene to add to the surgery case request.  Do you know if blood bank needs to be notified or is there something else we need to do?Angelo  ----- Message -----  From: Baylee Cerrato RN  Sent: 2/26/2025   7:59 AM CST  To: Baylee Cerrato RN; Mitzi Tom RN  Subject: RE: Urgent Callback                              Note from Yvette Ty, Cardiologist from 1/27 with ASA 81 instructions to continue ASA unless is unable to undergo surgery on ASA then would stop 7 days prior to surgery and resume as soon as safe from a surgical perspective. Will Saravanan proceed on ASA 81 or not?Please let me know.Thanks!  ----- Message -----  From: Mitzi Tom RN  Sent: 2/25/2025   3:06 PM CST  To: Baylee Cerrato RN; Mitzi Tom RN; Tiana Res#  Subject: FW: Urgent Callback                              Renzo Beach, I spoke with pt.   We didn't get anything either, however, tiana looked in media mgr and it appears someone scanned in a surgical release  on 2/17/25.  Can you review to see if that's all she needs and let me know please.   She does have an upcoming appt with janine, however she wanted to be sure we rec'd her surgical release that was sent.Future Appointments3/6/2025   11:00 AM   Neal, Nurse Janine,# [facility] S[facility] PADM        [facility]   12:00 PM   LAB[facility] [place] VNP        [facility]   12:05 PM   [facility] [place]/6[facility]   12:45 PM   EKG, APPT                  [facility] EKG            [facility]   10:30 AM   [facility] MAMMO1                [facility] MAMMO          [facility]3/7/2025   11:00 AM   BAPH USOP3                 BAP USOUNDO         [facility]  ----- Message -----  From: Ashkan Modi  Sent: 2/25/2025   2:36 PM CST  To: Saravanan Orourke Staff  Subject: Urgent Callback                                  Patient calling requesting a callback from nurse or provider in regards to finding out if  surgical clearance was received. She said the anesthesia department does not see it in the system. Please call back as soon as possible.

## 2025-03-05 ENCOUNTER — TELEPHONE (OUTPATIENT)
Dept: NEUROSURGERY | Facility: CLINIC | Age: 65
End: 2025-03-05
Payer: OTHER GOVERNMENT

## 2025-03-05 DIAGNOSIS — Z98.1 S/P SPINAL FUSION: Primary | ICD-10-CM

## 2025-03-05 NOTE — DISCHARGE INSTRUCTIONS
Your surgery has been scheduled for:___3/11/2025_    You should report to: [facility], located on the [facility] side of the            Second floor of the [facility] ([phone])    Please Note   Tell your doctor if you take Aspirin, products containing Aspirin, herbal medications  or blood thinners, such as Coumadin, Ticlid, or Plavix.  (Consult your provider regarding holding or stopping before surgery).  Arrange for someone to drive you home following surgery.  You will not be allowed to leave the surgical facility alone or drive yourself home following sedation and anesthesia.    Before Surgery  Stop taking all herbal medications, vitamins, and supplements 7 days prior to surgery  No Motrin/Advil (Ibuprofen) 7 days before surgery  No Aleve (Naproxen) 7 days before surgery  Stop Taking Asprin, products containing Asprin __7___days before surgery  Stop taking blood thinners_______days before surgery  No Goody's/BC  Powder 7 days before surgery  Refrain from drinking alcoholic beverages for 24hours before and after surgery  Stop or limit smoking _____7____days before surgery  You may take Tylenol for pain    Night before Surgery  Do not eat or drink after midnight  Take a shower or bath (shower is recommended).  Bathe with Hibiclens soap or an antibacterial soap from the neck down.  If not supplied by your surgeon, hibiclens soap will need to be purchased over the counter in pharmacy.  Rinse soap off thoroughly.  Shampoo your hair with your regular shampoo    The Day of Surgery  Take another bath or shower with hibiclens or any antibacterial soap, to reduce the chance of infection.  Take heart and blood pressure medications with a small sip of water, as advised by the perioperative team.  Do not take fluid pills  You may brush your teeth and rinse your mouth, but do not swall any additional water.   Do not apply perfumes, powder, body lotions or deodorant on the day of  surgery.  Nail polish should be removed.  Do not wear makeup or moisturizer  Wear comfortable clothes, such as a button front shirt and loose fitting pants.  Leave all jewelry, including body piercings, and valuables at home.    Bring any devices you will neeed after surgery such as crutches or canes.  If you have sleep apnea, please bring your CPAP machine  In the event that your physical condition changes including the onset of a cold or respiratory illness, or if you have to delay or cancel your surgery, please notify your surgeon.     Anesthesia: General Anesthesia     You are watched continuously during your procedure by your anesthesia provider.     Youre due to have surgery. During surgery, youll be given medicine called anesthesia or anesthetic. This will keep you comfortable and pain-free. Your anesthesia provider will use general anesthesia.  What is general anesthesia?  General anesthesia puts you into a state like deep sleep. It goes into the bloodstream (IV anesthetics), into the lungs (gas anesthetics), or both. You feel nothing during the procedure. You will not remember it. During the procedure, the anesthesia provider monitors you continuously. He or she checks your heart rate and rhythm, blood pressure, breathing, and blood oxygen.  IV anesthetics. IV anesthetics are given through an IV line in your arm. Theyre often given first. This is so you are asleep before a gas anesthetic is started. Some kinds of IV anesthetics relieve pain. Others relax you. Your doctor will decide which kind is best in your case.  Gas anesthetics. Gas anesthetics are breathed into the lungs. They are often used to keep you asleep. They can be given through a facemask or a tube placed in your larynx or trachea (breathing tube).  If you have a facemask, your anesthesia provider will most likely place it over your nose and mouth while youre still awake. Youll breathe oxygen through the mask as your IV anesthetic is  started. Gas anesthetic may be added through the mask.  If you have a tube in the larynx or trachea, it will be inserted into your throat after youre asleep.  Anesthesia tools and medicines  You will likely have:  IV anesthetics. These are put into an IV line into your bloodstream.  Gas anesthetics. You breathe these anesthetics into your lungs, where they pass into your bloodstream.  Pulse oximeter. This is a small clip that is attached to the end of your finger. This measures your blood oxygen level.  Electrocardiography leads (electrodes). These are small sticky pads that are placed on your chest. They record your heart rate and rhythm.  Blood pressure cuff. This reads your blood pressure.  Risks and possible complications  General anesthesia has some risks. These include:  Breathing problems  Nausea and vomiting  Sore throat or hoarseness (usually temporary)  Allergic reaction to the anesthetic  Irregular heartbeat (rare)  Cardiac arrest (rare)   Anesthesia safety  Follow all instructions you are given for how long not to eat or drink before your procedure.  Be sure your doctor knows what medicines and drugs you take. This includes over-the-counter medicines, herbs, supplements, alcohol or other drugs. You will be asked when those were last taken.  Have an adult family member or friend drive you home after the procedure.  For the first 24 hours after your surgery:  Do not drive or use heavy equipment.  Do not make important decisions or sign legal documents. If important decisions or signing legal documents is necessary during the first 24 hours after surgery, have a trusted family member or spouse act on your behalf.  Avoid alcohol.  Have a responsible adult stay with you. He or she can watch for problems and help keep you safe.  Date Last Reviewed: 12/1/2016 © 2000-2017 [employer]. [address] [place] [address], [place] [address]. All rights reserved. This information is not intended as a substitute  for professional medical care. Always follow your healthcare professional's instructions.

## 2025-03-05 NOTE — TELEPHONE ENCOUNTER
Phone call to patient with pre op instructions.  Patient instructed to remain NPO after midnight Monday , to bathe in hibicleanse or dial antibacterial soap Monday night and Tuesday  morning before arrival, and to arrive on 2nd floor [facility] ([facility])  at 5 am on Tuesday 3/11/25 Pt. Verbalized understanding of above instructions.

## 2025-03-06 ENCOUNTER — HOSPITAL ENCOUNTER (OUTPATIENT)
Dept: PREADMISSION TESTING | Facility: HOSPITAL | Age: 65
Discharge: HOME | End: 2025-03-06
Attending: NEUROLOGICAL SURGERY
Payer: OTHER GOVERNMENT

## 2025-03-06 ENCOUNTER — HOSPITAL ENCOUNTER (OUTPATIENT)
Dept: CARDIOLOGY | Facility: CLINIC | Age: 65
Discharge: HOME | End: 2025-03-06
Payer: OTHER GOVERNMENT

## 2025-03-06 VITALS
OXYGEN SATURATION: 96 % | DIASTOLIC BLOOD PRESSURE: 70 MMHG | SYSTOLIC BLOOD PRESSURE: 126 MMHG | BODY MASS INDEX: 36.14 KG/M2 | WEIGHT: 237.63 LBS | HEART RATE: 64 BPM

## 2025-03-06 DIAGNOSIS — Z01.818 PREOPERATIVE TESTING: ICD-10-CM

## 2025-03-06 LAB
OHS QRS DURATION: 98 MS
OHS QTC CALCULATION: 427 MS

## 2025-03-06 PROCEDURE — 93010 ELECTROCARDIOGRAM REPORT: CPT | Mod: S$PBB,,, | Performed by: STUDENT IN AN ORGANIZED HEALTH CARE EDUCATION/TRAINING PROGRAM

## 2025-03-06 PROCEDURE — 93005 ELECTROCARDIOGRAM TRACING: CPT | Mod: PBBFAC | Performed by: STUDENT IN AN ORGANIZED HEALTH CARE EDUCATION/TRAINING PROGRAM

## 2025-03-07 ENCOUNTER — PATIENT MESSAGE (OUTPATIENT)
Dept: NEUROSURGERY | Facility: CLINIC | Age: 65
End: 2025-03-07
Payer: OTHER GOVERNMENT

## 2025-03-10 ENCOUNTER — ANESTHESIA EVENT (OUTPATIENT)
Dept: SURGERY | Facility: HOSPITAL | Age: 65
End: 2025-03-10
Payer: OTHER GOVERNMENT

## 2025-03-11 ENCOUNTER — ANESTHESIA (OUTPATIENT)
Dept: SURGERY | Facility: HOSPITAL | Age: 65
End: 2025-03-11
Payer: OTHER GOVERNMENT

## 2025-03-11 ENCOUNTER — HOSPITAL ENCOUNTER (INPATIENT)
Facility: HOSPITAL | Age: 65
LOS: 6 days | Discharge: HOME HEALTH | DRG: 426 | End: 2025-03-17
Attending: NEUROLOGICAL SURGERY | Admitting: NEUROLOGICAL SURGERY
Payer: OTHER GOVERNMENT

## 2025-03-11 DIAGNOSIS — M19.90 OSTEOARTHRITIS, UNSPECIFIED OSTEOARTHRITIS TYPE, UNSPECIFIED SITE: ICD-10-CM

## 2025-03-11 DIAGNOSIS — C91.10 CLL (CHRONIC LYMPHOCYTIC LEUKEMIA): Chronic | ICD-10-CM

## 2025-03-11 DIAGNOSIS — M51.362 DEGENERATION OF INTERVERTEBRAL DISC OF LUMBAR REGION WITH DISCOGENIC BACK PAIN AND LOWER EXTREMITY PAIN: ICD-10-CM

## 2025-03-11 DIAGNOSIS — F10.11 HISTORY OF ALCOHOL ABUSE: ICD-10-CM

## 2025-03-11 DIAGNOSIS — I95.81 POSTOPERATIVE HYPOTENSION: ICD-10-CM

## 2025-03-11 DIAGNOSIS — M41.9 SCOLIOSIS, UNSPECIFIED SCOLIOSIS TYPE, UNSPECIFIED SPINAL REGION: ICD-10-CM

## 2025-03-11 DIAGNOSIS — D62 ACUTE BLOOD LOSS ANEMIA: ICD-10-CM

## 2025-03-11 DIAGNOSIS — I50.20 HFREF (HEART FAILURE WITH REDUCED EJECTION FRACTION): ICD-10-CM

## 2025-03-11 DIAGNOSIS — M41.9 SCOLIOSIS DEFORMITY OF SPINE: Primary | ICD-10-CM

## 2025-03-11 DIAGNOSIS — F17.200 TOBACCO DEPENDENCY: ICD-10-CM

## 2025-03-11 DIAGNOSIS — I95.9 HYPOTENSION: ICD-10-CM

## 2025-03-11 DIAGNOSIS — M48.061 SPINAL STENOSIS OF LUMBAR REGION WITHOUT NEUROGENIC CLAUDICATION: ICD-10-CM

## 2025-03-11 LAB
ABO + RH BLD: NORMAL
ALBUMIN SERPL BCP-MCNC: 2.9 G/DL (ref 3.5–5.2)
ALP SERPL-CCNC: 85 U/L (ref 40–150)
ALT SERPL W/O P-5'-P-CCNC: 31 U/L (ref 10–44)
ANION GAP SERPL CALC-SCNC: 6 MMOL/L (ref 8–16)
APTT PPP: 25.6 SEC (ref 21–32)
AST SERPL-CCNC: 54 U/L (ref 10–40)
BASOPHILS NFR BLD: 0 % (ref 0–1.9)
BASOPHILS NFR BLD: 0 % (ref 0–1.9)
BILIRUB SERPL-MCNC: 0.6 MG/DL (ref 0.1–1)
BLD GP AB SCN CELLS X3 SERPL QL: NORMAL
BLD PROD TYP BPU: NORMAL
BLOOD UNIT EXPIRATION DATE: NORMAL
BLOOD UNIT TYPE CODE: 600
BLOOD UNIT TYPE CODE: 6200
BLOOD UNIT TYPE CODE: 9500
BLOOD UNIT TYPE: NORMAL
BUN SERPL-MCNC: 20 MG/DL (ref 8–23)
CA-I BLDV-SCNC: 1.07 MMOL/L (ref 1.06–1.42)
CA-I BLDV-SCNC: 1.08 MMOL/L (ref 1.06–1.42)
CALCIUM SERPL-MCNC: 7.4 MG/DL (ref 8.7–10.5)
CHLORIDE SERPL-SCNC: 111 MMOL/L (ref 95–110)
CO2 SERPL-SCNC: 19 MMOL/L (ref 23–29)
CODING SYSTEM: NORMAL
CREAT SERPL-MCNC: 0.8 MG/DL (ref 0.5–1.4)
CROSSMATCH INTERPRETATION: NORMAL
DIFFERENTIAL METHOD BLD: ABNORMAL
DIFFERENTIAL METHOD BLD: ABNORMAL
DISPENSE STATUS: NORMAL
EOSINOPHIL NFR BLD: 0 % (ref 0–8)
EOSINOPHIL NFR BLD: 1 % (ref 0–8)
ERYTHROCYTE [DISTWIDTH] IN BLOOD BY AUTOMATED COUNT: 14.1 % (ref 11.5–14.5)
ERYTHROCYTE [DISTWIDTH] IN BLOOD BY AUTOMATED COUNT: 14.6 % (ref 11.5–14.5)
ERYTHROCYTE [DISTWIDTH] IN BLOOD BY AUTOMATED COUNT: 14.6 % (ref 11.5–14.5)
EST. GFR  (NO RACE VARIABLE): >60 ML/MIN/1.73 M^2
FIBRINOGEN PPP-MCNC: 262 MG/DL (ref 182–400)
GLUCOSE SERPL-MCNC: 105 MG/DL (ref 70–110)
GLUCOSE SERPL-MCNC: 134 MG/DL (ref 70–110)
GLUCOSE SERPL-MCNC: 142 MG/DL (ref 70–110)
GLUCOSE SERPL-MCNC: 153 MG/DL (ref 70–110)
HCO3 UR-SCNC: 21.4 MMOL/L (ref 24–28)
HCO3 UR-SCNC: 22.4 MMOL/L (ref 24–28)
HCO3 UR-SCNC: 23.3 MMOL/L (ref 24–28)
HCT VFR BLD AUTO: 33.7 % (ref 37–48.5)
HCT VFR BLD AUTO: 36.2 % (ref 37–48.5)
HCT VFR BLD AUTO: 39.6 % (ref 37–48.5)
HCT VFR BLD CALC: 29 %PCV (ref 36–54)
HCT VFR BLD CALC: 30 %PCV (ref 36–54)
HCT VFR BLD CALC: 31 %PCV (ref 36–54)
HGB BLD-MCNC: 10.2 G/DL (ref 12–16)
HGB BLD-MCNC: 11.3 G/DL (ref 12–16)
HGB BLD-MCNC: 12.1 G/DL (ref 12–16)
IMM GRANULOCYTES # BLD AUTO: ABNORMAL K/UL (ref 0–0.04)
IMM GRANULOCYTES # BLD AUTO: ABNORMAL K/UL (ref 0–0.04)
IMM GRANULOCYTES NFR BLD AUTO: ABNORMAL % (ref 0–0.5)
IMM GRANULOCYTES NFR BLD AUTO: ABNORMAL % (ref 0–0.5)
INR PPP: 1.1 (ref 0.8–1.2)
LYMPHOCYTES NFR BLD: 71 % (ref 18–48)
LYMPHOCYTES NFR BLD: 82 % (ref 18–48)
MCH RBC QN AUTO: 29.3 PG (ref 27–31)
MCH RBC QN AUTO: 29.3 PG (ref 27–31)
MCH RBC QN AUTO: 30.1 PG (ref 27–31)
MCHC RBC AUTO-ENTMCNC: 30.3 G/DL (ref 32–36)
MCHC RBC AUTO-ENTMCNC: 30.6 G/DL (ref 32–36)
MCHC RBC AUTO-ENTMCNC: 31.2 G/DL (ref 32–36)
MCV RBC AUTO: 96 FL (ref 82–98)
MCV RBC AUTO: 97 FL (ref 82–98)
MCV RBC AUTO: 97 FL (ref 82–98)
MONOCYTES NFR BLD: 0 % (ref 4–15)
MONOCYTES NFR BLD: 1 % (ref 4–15)
NEUTROPHILS NFR BLD: 15 % (ref 38–73)
NEUTROPHILS NFR BLD: 23 % (ref 38–73)
NEUTS BAND NFR BLD MANUAL: 1 %
NEUTS BAND NFR BLD MANUAL: 6 %
NRBC BLD-RTO: 0 /100 WBC
NRBC BLD-RTO: 0 /100 WBC
PCO2 BLDA: 39.6 MMHG (ref 35–45)
PCO2 BLDA: 43.1 MMHG (ref 35–45)
PCO2 BLDA: 46.4 MMHG (ref 35–45)
PH SMN: 7.3 [PH] (ref 7.35–7.45)
PH SMN: 7.31 [PH] (ref 7.35–7.45)
PH SMN: 7.36 [PH] (ref 7.35–7.45)
PLATELET # BLD AUTO: 200 K/UL (ref 150–450)
PLATELET # BLD AUTO: 230 K/UL (ref 150–450)
PLATELET # BLD AUTO: 256 K/UL (ref 150–450)
PLATELET BLD QL SMEAR: ABNORMAL
PLATELET BLD QL SMEAR: ABNORMAL
PMV BLD AUTO: 11.3 FL (ref 9.2–12.9)
PMV BLD AUTO: 11.6 FL (ref 9.2–12.9)
PMV BLD AUTO: 12.4 FL (ref 9.2–12.9)
PO2 BLDA: 142 MMHG (ref 80–100)
PO2 BLDA: 210 MMHG (ref 80–100)
PO2 BLDA: 307 MMHG (ref 80–100)
POC BE: -3 MMOL/L
POC BE: -3 MMOL/L
POC BE: -5 MMOL/L
POC IONIZED CALCIUM: 1.1 MMOL/L (ref 1.06–1.42)
POC IONIZED CALCIUM: 1.15 MMOL/L (ref 1.06–1.42)
POC IONIZED CALCIUM: 1.16 MMOL/L (ref 1.06–1.42)
POC SATURATED O2: 100 % (ref 95–100)
POC SATURATED O2: 100 % (ref 95–100)
POC SATURATED O2: 99 % (ref 95–100)
POC TCO2: 23 MMOL/L (ref 23–27)
POC TCO2: 24 MMOL/L (ref 23–27)
POC TCO2: 25 MMOL/L (ref 23–27)
POCT GLUCOSE: 157 MG/DL (ref 70–110)
POTASSIUM BLD-SCNC: 3.6 MMOL/L (ref 3.5–5.1)
POTASSIUM BLD-SCNC: 3.9 MMOL/L (ref 3.5–5.1)
POTASSIUM BLD-SCNC: 4 MMOL/L (ref 3.5–5.1)
POTASSIUM SERPL-SCNC: 4.8 MMOL/L (ref 3.5–5.1)
PROT SERPL-MCNC: 5 G/DL (ref 6–8.4)
PROTHROMBIN TIME: 11.6 SEC (ref 9–12.5)
RBC # BLD AUTO: 3.48 M/UL (ref 4–5.4)
RBC # BLD AUTO: 3.75 M/UL (ref 4–5.4)
RBC # BLD AUTO: 4.13 M/UL (ref 4–5.4)
SAMPLE: ABNORMAL
SODIUM BLD-SCNC: 138 MMOL/L (ref 136–145)
SODIUM BLD-SCNC: 138 MMOL/L (ref 136–145)
SODIUM BLD-SCNC: 139 MMOL/L (ref 136–145)
SODIUM SERPL-SCNC: 136 MMOL/L (ref 136–145)
SPECIMEN OUTDATE: NORMAL
TRANS ERYTHROCYTES VOL PATIENT: NORMAL ML
UNIT NUMBER: NORMAL
UNIT NUMBER: NORMAL
WBC # BLD AUTO: 39.49 K/UL (ref 3.9–12.7)
WBC # BLD AUTO: 58.77 K/UL (ref 3.9–12.7)
WBC # BLD AUTO: 62.71 K/UL (ref 3.9–12.7)

## 2025-03-11 PROCEDURE — P9021 RED BLOOD CELLS UNIT: HCPCS | Performed by: NEUROLOGICAL SURGERY

## 2025-03-11 PROCEDURE — 27800903 OPTIME MED/SURG SUP & DEVICES OTHER IMPLANTS: Performed by: NEUROLOGICAL SURGERY

## 2025-03-11 PROCEDURE — 27000221 HC OXYGEN, UP TO 24 HOURS

## 2025-03-11 PROCEDURE — 25000003 PHARM REV CODE 250: Performed by: NURSE ANESTHETIST, CERTIFIED REGISTERED

## 2025-03-11 PROCEDURE — 0RG6071 FUSION OF THORACIC VERTEBRAL JOINT WITH AUTOLOGOUS TISSUE SUBSTITUTE, POSTERIOR APPROACH, POSTERIOR COLUMN, OPEN APPROACH: ICD-10-PCS | Performed by: NEUROLOGICAL SURGERY

## 2025-03-11 PROCEDURE — 37000009 HC ANESTHESIA EA ADD 15 MINS: Performed by: NEUROLOGICAL SURGERY

## 2025-03-11 PROCEDURE — 0SG1071 FUSION OF 2 OR MORE LUMBAR VERTEBRAL JOINTS WITH AUTOLOGOUS TISSUE SUBSTITUTE, POSTERIOR APPROACH, POSTERIOR COLUMN, OPEN APPROACH: ICD-10-PCS | Performed by: NEUROLOGICAL SURGERY

## 2025-03-11 PROCEDURE — 85730 THROMBOPLASTIN TIME PARTIAL: CPT | Performed by: STUDENT IN AN ORGANIZED HEALTH CARE EDUCATION/TRAINING PROGRAM

## 2025-03-11 PROCEDURE — 85027 COMPLETE CBC AUTOMATED: CPT

## 2025-03-11 PROCEDURE — 0SH804Z INSERTION OF INTERNAL FIXATION DEVICE INTO LEFT SACROILIAC JOINT, OPEN APPROACH: ICD-10-PCS | Performed by: NEUROLOGICAL SURGERY

## 2025-03-11 PROCEDURE — P9035 PLATELET PHERES LEUKOREDUCED: HCPCS | Performed by: STUDENT IN AN ORGANIZED HEALTH CARE EDUCATION/TRAINING PROGRAM

## 2025-03-11 PROCEDURE — 25000003 PHARM REV CODE 250: Performed by: PSYCHIATRY & NEUROLOGY

## 2025-03-11 PROCEDURE — 63600175 PHARM REV CODE 636 W HCPCS: Performed by: NURSE ANESTHETIST, CERTIFIED REGISTERED

## 2025-03-11 PROCEDURE — 37000008 HC ANESTHESIA 1ST 15 MINUTES: Performed by: NEUROLOGICAL SURGERY

## 2025-03-11 PROCEDURE — 25000003 PHARM REV CODE 250: Performed by: STUDENT IN AN ORGANIZED HEALTH CARE EDUCATION/TRAINING PROGRAM

## 2025-03-11 PROCEDURE — P9021 RED BLOOD CELLS UNIT: HCPCS

## 2025-03-11 PROCEDURE — 0QB00ZZ EXCISION OF LUMBAR VERTEBRA, OPEN APPROACH: ICD-10-PCS | Performed by: NEUROLOGICAL SURGERY

## 2025-03-11 PROCEDURE — 25000003 PHARM REV CODE 250

## 2025-03-11 PROCEDURE — 30233R1 TRANSFUSION OF NONAUTOLOGOUS PLATELETS INTO PERIPHERAL VEIN, PERCUTANEOUS APPROACH: ICD-10-PCS | Performed by: NEUROLOGICAL SURGERY

## 2025-03-11 PROCEDURE — 94761 N-INVAS EAR/PLS OXIMETRY MLT: CPT

## 2025-03-11 PROCEDURE — 85060 BLOOD SMEAR INTERPRETATION: CPT | Mod: ,,, | Performed by: PATHOLOGY

## 2025-03-11 PROCEDURE — 15734 MUSCLE-SKIN GRAFT TRUNK: CPT | Mod: ,,, | Performed by: SURGERY

## 2025-03-11 PROCEDURE — 36000711: Performed by: NEUROLOGICAL SURGERY

## 2025-03-11 PROCEDURE — 00NY0ZZ RELEASE LUMBAR SPINAL CORD, OPEN APPROACH: ICD-10-PCS | Performed by: NEUROLOGICAL SURGERY

## 2025-03-11 PROCEDURE — 20000000 HC ICU ROOM

## 2025-03-11 PROCEDURE — 0SG00AJ FUSION OF LUMBAR VERTEBRAL JOINT WITH INTERBODY FUSION DEVICE, POSTERIOR APPROACH, ANTERIOR COLUMN, OPEN APPROACH: ICD-10-PCS | Performed by: NEUROLOGICAL SURGERY

## 2025-03-11 PROCEDURE — 01NR0ZZ RELEASE SACRAL NERVE, OPEN APPROACH: ICD-10-PCS | Performed by: NEUROLOGICAL SURGERY

## 2025-03-11 PROCEDURE — 63600175 PHARM REV CODE 636 W HCPCS

## 2025-03-11 PROCEDURE — 0SB20ZZ EXCISION OF LUMBAR VERTEBRAL DISC, OPEN APPROACH: ICD-10-PCS | Performed by: NEUROLOGICAL SURGERY

## 2025-03-11 PROCEDURE — 27201037 HC PRESSURE MONITORING SET UP

## 2025-03-11 PROCEDURE — 0SG30AJ FUSION OF LUMBOSACRAL JOINT WITH INTERBODY FUSION DEVICE, POSTERIOR APPROACH, ANTERIOR COLUMN, OPEN APPROACH: ICD-10-PCS | Performed by: NEUROLOGICAL SURGERY

## 2025-03-11 PROCEDURE — C1713 ANCHOR/SCREW BN/BN,TIS/BN: HCPCS | Performed by: NEUROLOGICAL SURGERY

## 2025-03-11 PROCEDURE — 0SB40ZZ EXCISION OF LUMBOSACRAL DISC, OPEN APPROACH: ICD-10-PCS | Performed by: NEUROLOGICAL SURGERY

## 2025-03-11 PROCEDURE — 85027 COMPLETE CBC AUTOMATED: CPT | Mod: 91 | Performed by: STUDENT IN AN ORGANIZED HEALTH CARE EDUCATION/TRAINING PROGRAM

## 2025-03-11 PROCEDURE — 82330 ASSAY OF CALCIUM: CPT | Mod: 91 | Performed by: NEUROLOGICAL SURGERY

## 2025-03-11 PROCEDURE — 01NB0ZZ RELEASE LUMBAR NERVE, OPEN APPROACH: ICD-10-PCS | Performed by: NEUROLOGICAL SURGERY

## 2025-03-11 PROCEDURE — 63600175 PHARM REV CODE 636 W HCPCS: Performed by: STUDENT IN AN ORGANIZED HEALTH CARE EDUCATION/TRAINING PROGRAM

## 2025-03-11 PROCEDURE — 63600175 PHARM REV CODE 636 W HCPCS: Performed by: NEUROLOGICAL SURGERY

## 2025-03-11 PROCEDURE — 0QS004Z REPOSITION LUMBAR VERTEBRA WITH INTERNAL FIXATION DEVICE, OPEN APPROACH: ICD-10-PCS | Performed by: NEUROLOGICAL SURGERY

## 2025-03-11 PROCEDURE — C1729 CATH, DRAINAGE: HCPCS | Performed by: NEUROLOGICAL SURGERY

## 2025-03-11 PROCEDURE — 0RGA071 FUSION OF THORACOLUMBAR VERTEBRAL JOINT WITH AUTOLOGOUS TISSUE SUBSTITUTE, POSTERIOR APPROACH, POSTERIOR COLUMN, OPEN APPROACH: ICD-10-PCS | Performed by: NEUROLOGICAL SURGERY

## 2025-03-11 PROCEDURE — 99900035 HC TECH TIME PER 15 MIN (STAT)

## 2025-03-11 PROCEDURE — 25000003 PHARM REV CODE 250: Performed by: NEUROLOGICAL SURGERY

## 2025-03-11 PROCEDURE — C1734 ORTH/DEVIC/DRUG BN/BN,TIS/BN: HCPCS | Performed by: NEUROLOGICAL SURGERY

## 2025-03-11 PROCEDURE — 80053 COMPREHEN METABOLIC PANEL: CPT | Performed by: STUDENT IN AN ORGANIZED HEALTH CARE EDUCATION/TRAINING PROGRAM

## 2025-03-11 PROCEDURE — 36000710: Performed by: NEUROLOGICAL SURGERY

## 2025-03-11 PROCEDURE — 85384 FIBRINOGEN ACTIVITY: CPT | Performed by: STUDENT IN AN ORGANIZED HEALTH CARE EDUCATION/TRAINING PROGRAM

## 2025-03-11 PROCEDURE — 27201423 OPTIME MED/SURG SUP & DEVICES STERILE SUPPLY: Performed by: NEUROLOGICAL SURGERY

## 2025-03-11 PROCEDURE — 82330 ASSAY OF CALCIUM: CPT

## 2025-03-11 PROCEDURE — 30233N1 TRANSFUSION OF NONAUTOLOGOUS RED BLOOD CELLS INTO PERIPHERAL VEIN, PERCUTANEOUS APPROACH: ICD-10-PCS | Performed by: NEUROLOGICAL SURGERY

## 2025-03-11 PROCEDURE — 85007 BL SMEAR W/DIFF WBC COUNT: CPT | Mod: 91 | Performed by: STUDENT IN AN ORGANIZED HEALTH CARE EDUCATION/TRAINING PROGRAM

## 2025-03-11 PROCEDURE — 99291 CRITICAL CARE FIRST HOUR: CPT | Mod: ,,, | Performed by: PSYCHIATRY & NEUROLOGY

## 2025-03-11 PROCEDURE — 0SG3071 FUSION OF LUMBOSACRAL JOINT WITH AUTOLOGOUS TISSUE SUBSTITUTE, POSTERIOR APPROACH, POSTERIOR COLUMN, OPEN APPROACH: ICD-10-PCS | Performed by: NEUROLOGICAL SURGERY

## 2025-03-11 PROCEDURE — 86900 BLOOD TYPING SEROLOGIC ABO: CPT | Performed by: STUDENT IN AN ORGANIZED HEALTH CARE EDUCATION/TRAINING PROGRAM

## 2025-03-11 PROCEDURE — 86920 COMPATIBILITY TEST SPIN: CPT

## 2025-03-11 PROCEDURE — 85610 PROTHROMBIN TIME: CPT | Performed by: STUDENT IN AN ORGANIZED HEALTH CARE EDUCATION/TRAINING PROGRAM

## 2025-03-11 PROCEDURE — 0SH704Z INSERTION OF INTERNAL FIXATION DEVICE INTO RIGHT SACROILIAC JOINT, OPEN APPROACH: ICD-10-PCS | Performed by: NEUROLOGICAL SURGERY

## 2025-03-11 PROCEDURE — 86920 COMPATIBILITY TEST SPIN: CPT | Performed by: NEUROLOGICAL SURGERY

## 2025-03-11 DEVICE — SCREW EXPEDIUM VERSE 5.5 6X45: Type: IMPLANTABLE DEVICE | Site: SPINE LUMBAR | Status: FUNCTIONAL

## 2025-03-11 DEVICE — SET SCREW EXPEDIUM VERSE UNITZ: Type: IMPLANTABLE DEVICE | Site: SPINE LUMBAR | Status: FUNCTIONAL

## 2025-03-11 DEVICE — KIT BONE GRAFT INFUSE LG 8MM: Type: IMPLANTABLE DEVICE | Site: SPINE LUMBAR | Status: FUNCTIONAL

## 2025-03-11 DEVICE — ROD SPINE MTRX STR 400X80MM: Type: IMPLANTABLE DEVICE | Site: SPINE LUMBAR | Status: FUNCTIONAL

## 2025-03-11 DEVICE — IMPLANTABLE DEVICE: Type: IMPLANTABLE DEVICE | Site: SPINE LUMBAR | Status: FUNCTIONAL

## 2025-03-11 DEVICE — SCREW EXPEDIUM FEN STRL 5X40MM: Type: IMPLANTABLE DEVICE | Site: SPINE LUMBAR | Status: FUNCTIONAL

## 2025-03-11 DEVICE — CONFIDENCE SPINAL CEMENT SYSTEM CONFIDENCE KIT SPINAL CEMENT SYSTEM
Type: IMPLANTABLE DEVICE | Site: SPINE LUMBAR | Status: FUNCTIONAL
Brand: CONFIDENCE SPINAL CEMENT SYSTEM

## 2025-03-11 DEVICE — SCREW INNER SINGLE SET TITANIU: Type: IMPLANTABLE DEVICE | Site: SPINE LUMBAR | Status: FUNCTIONAL

## 2025-03-11 DEVICE — SCREW EXPEDIUM VERSE PA 8X80MM: Type: IMPLANTABLE DEVICE | Site: SPINE LUMBAR | Status: FUNCTIONAL

## 2025-03-11 DEVICE — CONNECTOR EXPEDIUM 5.5 30MM: Type: IMPLANTABLE DEVICE | Site: SPINE LUMBAR | Status: FUNCTIONAL

## 2025-03-11 DEVICE — SCREW BONE SPINAL 5.5 6 X 45MM: Type: IMPLANTABLE DEVICE | Site: SPINE LUMBAR | Status: FUNCTIONAL

## 2025-03-11 DEVICE — CAGE POST LUM LORDTC 9MM 9X26: Type: IMPLANTABLE DEVICE | Site: SPINE LUMBAR | Status: FUNCTIONAL

## 2025-03-11 DEVICE — SCREW BONE SPINAL 5.5 5 X 45MM: Type: IMPLANTABLE DEVICE | Site: SPINE LUMBAR | Status: FUNCTIONAL

## 2025-03-11 DEVICE — CONNECTOR SPINAL SFX A5 5.5MM: Type: IMPLANTABLE DEVICE | Site: SPINE LUMBAR | Status: FUNCTIONAL

## 2025-03-11 RX ORDER — ENOXAPARIN SODIUM 100 MG/ML
40 INJECTION SUBCUTANEOUS EVERY 24 HOURS
Status: DISCONTINUED | OUTPATIENT
Start: 2025-03-12 | End: 2025-03-17 | Stop reason: HOSPADM

## 2025-03-11 RX ORDER — HYDROMORPHONE HYDROCHLORIDE 1 MG/ML
1 INJECTION, SOLUTION INTRAMUSCULAR; INTRAVENOUS; SUBCUTANEOUS
Status: DISCONTINUED | OUTPATIENT
Start: 2025-03-11 | End: 2025-03-13

## 2025-03-11 RX ORDER — AMOXICILLIN 250 MG
2 CAPSULE ORAL NIGHTLY
Status: DISCONTINUED | OUTPATIENT
Start: 2025-03-11 | End: 2025-03-12

## 2025-03-11 RX ORDER — HYDROCODONE BITARTRATE AND ACETAMINOPHEN 500; 5 MG/1; MG/1
TABLET ORAL
Status: DISCONTINUED | OUTPATIENT
Start: 2025-03-11 | End: 2025-03-12

## 2025-03-11 RX ORDER — OXYCODONE HYDROCHLORIDE 5 MG/1
5 TABLET ORAL EVERY 4 HOURS PRN
Refills: 0 | Status: DISCONTINUED | OUTPATIENT
Start: 2025-03-11 | End: 2025-03-12

## 2025-03-11 RX ORDER — LANOLIN ALCOHOL/MO/W.PET/CERES
800 CREAM (GRAM) TOPICAL
Status: DISCONTINUED | OUTPATIENT
Start: 2025-03-11 | End: 2025-03-13

## 2025-03-11 RX ORDER — NOREPINEPHRINE BITARTRATE 0.02 MG/ML
0-.2 INJECTION, SOLUTION INTRAVENOUS CONTINUOUS
Status: DISCONTINUED | OUTPATIENT
Start: 2025-03-11 | End: 2025-03-12

## 2025-03-11 RX ORDER — VALSARTAN 40 MG/1
40 TABLET ORAL DAILY
Status: DISCONTINUED | OUTPATIENT
Start: 2025-03-12 | End: 2025-03-11

## 2025-03-11 RX ORDER — CEFAZOLIN SODIUM 1 G/3ML
1 INJECTION, POWDER, FOR SOLUTION INTRAMUSCULAR; INTRAVENOUS
Status: DISCONTINUED | OUTPATIENT
Start: 2025-03-11 | End: 2025-03-12

## 2025-03-11 RX ORDER — PANTOPRAZOLE SODIUM 20 MG/1
20 TABLET, DELAYED RELEASE ORAL DAILY
Status: DISCONTINUED | OUTPATIENT
Start: 2025-03-12 | End: 2025-03-17 | Stop reason: HOSPADM

## 2025-03-11 RX ORDER — BACLOFEN 10 MG/1
10 TABLET ORAL 3 TIMES DAILY
Status: DISCONTINUED | OUTPATIENT
Start: 2025-03-11 | End: 2025-03-17 | Stop reason: HOSPADM

## 2025-03-11 RX ORDER — POLYETHYLENE GLYCOL 3350 17 G/17G
17 POWDER, FOR SOLUTION ORAL DAILY
Status: DISCONTINUED | OUTPATIENT
Start: 2025-03-11 | End: 2025-03-17

## 2025-03-11 RX ORDER — AMOXICILLIN 250 MG
2 CAPSULE ORAL DAILY PRN
Status: DISCONTINUED | OUTPATIENT
Start: 2025-03-11 | End: 2025-03-11

## 2025-03-11 RX ORDER — LANOLIN ALCOHOL/MO/W.PET/CERES
400 CREAM (GRAM) TOPICAL DAILY
Status: DISCONTINUED | OUTPATIENT
Start: 2025-03-12 | End: 2025-03-11

## 2025-03-11 RX ORDER — PHENYLEPHRINE HYDROCHLORIDE 10 MG/ML
100 INJECTION INTRAVENOUS ONCE
Status: DISCONTINUED | OUTPATIENT
Start: 2025-03-11 | End: 2025-03-11

## 2025-03-11 RX ORDER — MUPIROCIN 20 MG/G
OINTMENT TOPICAL
Status: DISCONTINUED | OUTPATIENT
Start: 2025-03-11 | End: 2025-03-11 | Stop reason: HOSPADM

## 2025-03-11 RX ORDER — FUROSEMIDE 40 MG/1
40 TABLET ORAL DAILY PRN
Status: DISCONTINUED | OUTPATIENT
Start: 2025-03-11 | End: 2025-03-14

## 2025-03-11 RX ORDER — DIAZEPAM 5 MG/1
5 TABLET ORAL EVERY 8 HOURS PRN
Status: DISCONTINUED | OUTPATIENT
Start: 2025-03-11 | End: 2025-03-12

## 2025-03-11 RX ORDER — PREGABALIN 150 MG/1
150 CAPSULE ORAL 2 TIMES DAILY
Status: DISCONTINUED | OUTPATIENT
Start: 2025-03-11 | End: 2025-03-14

## 2025-03-11 RX ORDER — DIAZEPAM 5 MG/1
5 TABLET ORAL EVERY 8 HOURS
Status: DISCONTINUED | OUTPATIENT
Start: 2025-03-11 | End: 2025-03-11

## 2025-03-11 RX ORDER — LIDOCAINE HYDROCHLORIDE 20 MG/ML
INJECTION INTRAVENOUS
Status: DISCONTINUED | OUTPATIENT
Start: 2025-03-11 | End: 2025-03-11

## 2025-03-11 RX ORDER — PHENYLEPHRINE HCL IN 0.9% NACL 20MG/250ML
0-3 PLASTIC BAG, INJECTION (ML) INTRAVENOUS CONTINUOUS
Status: DISCONTINUED | OUTPATIENT
Start: 2025-03-11 | End: 2025-03-12

## 2025-03-11 RX ORDER — BACLOFEN 10 MG/1
10 TABLET ORAL 3 TIMES DAILY
Status: DISCONTINUED | OUTPATIENT
Start: 2025-03-11 | End: 2025-03-11

## 2025-03-11 RX ORDER — VANCOMYCIN HYDROCHLORIDE 1 G/20ML
INJECTION, POWDER, LYOPHILIZED, FOR SOLUTION INTRAVENOUS
Status: DISCONTINUED | OUTPATIENT
Start: 2025-03-11 | End: 2025-03-11 | Stop reason: HOSPADM

## 2025-03-11 RX ORDER — AMOXICILLIN 250 MG
2 CAPSULE ORAL DAILY
Status: DISCONTINUED | OUTPATIENT
Start: 2025-03-12 | End: 2025-03-11

## 2025-03-11 RX ORDER — LABETALOL HCL 20 MG/4 ML
10 SYRINGE (ML) INTRAVENOUS EVERY 10 MIN PRN
Status: DISCONTINUED | OUTPATIENT
Start: 2025-03-11 | End: 2025-03-13

## 2025-03-11 RX ORDER — SUCCINYLCHOLINE CHLORIDE 20 MG/ML
INJECTION INTRAMUSCULAR; INTRAVENOUS
Status: DISCONTINUED | OUTPATIENT
Start: 2025-03-11 | End: 2025-03-11

## 2025-03-11 RX ORDER — MUPIROCIN 20 MG/G
1 OINTMENT TOPICAL 2 TIMES DAILY
Status: DISCONTINUED | OUTPATIENT
Start: 2025-03-11 | End: 2025-03-11 | Stop reason: HOSPADM

## 2025-03-11 RX ORDER — KETAMINE HCL IN 0.9 % NACL 50 MG/5 ML
SYRINGE (ML) INTRAVENOUS
Status: DISCONTINUED | OUTPATIENT
Start: 2025-03-11 | End: 2025-03-11

## 2025-03-11 RX ORDER — ACETAMINOPHEN 325 MG/1
650 TABLET ORAL EVERY 6 HOURS PRN
Status: DISCONTINUED | OUTPATIENT
Start: 2025-03-11 | End: 2025-03-13

## 2025-03-11 RX ORDER — HYDROMORPHONE HYDROCHLORIDE 1 MG/ML
INJECTION, SOLUTION INTRAMUSCULAR; INTRAVENOUS; SUBCUTANEOUS
Status: DISCONTINUED | OUTPATIENT
Start: 2025-03-11 | End: 2025-03-11

## 2025-03-11 RX ORDER — CEFAZOLIN 2 G/1
2 INJECTION, POWDER, FOR SOLUTION INTRAMUSCULAR; INTRAVENOUS
Status: COMPLETED | OUTPATIENT
Start: 2025-03-11 | End: 2025-03-11

## 2025-03-11 RX ORDER — PHENYLEPHRINE HYDROCHLORIDE 10 MG/ML
100 INJECTION INTRAVENOUS ONCE
Status: COMPLETED | OUTPATIENT
Start: 2025-03-11 | End: 2025-03-11

## 2025-03-11 RX ORDER — FENTANYL CITRATE 50 UG/ML
INJECTION, SOLUTION INTRAMUSCULAR; INTRAVENOUS
Status: DISCONTINUED | OUTPATIENT
Start: 2025-03-11 | End: 2025-03-11

## 2025-03-11 RX ORDER — NOREPINEPHRINE BITARTRATE 0.02 MG/ML
INJECTION, SOLUTION INTRAVENOUS
Status: COMPLETED
Start: 2025-03-11 | End: 2025-03-11

## 2025-03-11 RX ORDER — OXYCODONE HYDROCHLORIDE 5 MG/1
10 TABLET ORAL EVERY 4 HOURS PRN
Refills: 0 | Status: DISCONTINUED | OUTPATIENT
Start: 2025-03-11 | End: 2025-03-12

## 2025-03-11 RX ORDER — TRANEXAMIC ACID 100 MG/ML
INJECTION, SOLUTION INTRAVENOUS
Status: DISCONTINUED | OUTPATIENT
Start: 2025-03-11 | End: 2025-03-11

## 2025-03-11 RX ORDER — ASCORBIC ACID 250 MG
250 TABLET ORAL DAILY
Status: DISCONTINUED | OUTPATIENT
Start: 2025-03-12 | End: 2025-03-17 | Stop reason: HOSPADM

## 2025-03-11 RX ORDER — ONDANSETRON HYDROCHLORIDE 2 MG/ML
INJECTION, SOLUTION INTRAVENOUS
Status: DISCONTINUED | OUTPATIENT
Start: 2025-03-11 | End: 2025-03-11

## 2025-03-11 RX ORDER — ATORVASTATIN CALCIUM 20 MG/1
20 TABLET, FILM COATED ORAL DAILY
Status: DISCONTINUED | OUTPATIENT
Start: 2025-03-11 | End: 2025-03-17 | Stop reason: HOSPADM

## 2025-03-11 RX ORDER — ASPIRIN 81 MG/1
81 TABLET ORAL DAILY
Status: DISCONTINUED | OUTPATIENT
Start: 2025-03-12 | End: 2025-03-11

## 2025-03-11 RX ORDER — SODIUM CHLORIDE 9 MG/ML
INJECTION, SOLUTION INTRAVENOUS CONTINUOUS
Status: ACTIVE | OUTPATIENT
Start: 2025-03-11 | End: 2025-03-12

## 2025-03-11 RX ORDER — SPIRONOLACTONE 25 MG/1
25 TABLET ORAL DAILY
Status: DISCONTINUED | OUTPATIENT
Start: 2025-03-12 | End: 2025-03-11

## 2025-03-11 RX ORDER — SODIUM,POTASSIUM PHOSPHATES 280-250MG
2 POWDER IN PACKET (EA) ORAL
Status: DISCONTINUED | OUTPATIENT
Start: 2025-03-11 | End: 2025-03-13

## 2025-03-11 RX ORDER — PHENYLEPHRINE HCL IN 0.9% NACL 20MG/250ML
PLASTIC BAG, INJECTION (ML) INTRAVENOUS
Status: COMPLETED
Start: 2025-03-11 | End: 2025-03-11

## 2025-03-11 RX ORDER — DEXAMETHASONE SODIUM PHOSPHATE 4 MG/ML
INJECTION, SOLUTION INTRA-ARTICULAR; INTRALESIONAL; INTRAMUSCULAR; INTRAVENOUS; SOFT TISSUE
Status: DISCONTINUED | OUTPATIENT
Start: 2025-03-11 | End: 2025-03-11

## 2025-03-11 RX ORDER — MIDAZOLAM HYDROCHLORIDE 1 MG/ML
INJECTION INTRAMUSCULAR; INTRAVENOUS
Status: DISCONTINUED | OUTPATIENT
Start: 2025-03-11 | End: 2025-03-11

## 2025-03-11 RX ORDER — ROCURONIUM BROMIDE 10 MG/ML
INJECTION, SOLUTION INTRAVENOUS
Status: DISCONTINUED | OUTPATIENT
Start: 2025-03-11 | End: 2025-03-11

## 2025-03-11 RX ORDER — CARBOXYMETHYLCELLULOSE SODIUM 5 MG/ML
1 SOLUTION/ DROPS OPHTHALMIC 3 TIMES DAILY PRN
COMMUNITY

## 2025-03-11 RX ORDER — LANOLIN ALCOHOL/MO/W.PET/CERES
400 CREAM (GRAM) TOPICAL NIGHTLY
Status: DISCONTINUED | OUTPATIENT
Start: 2025-03-11 | End: 2025-03-13

## 2025-03-11 RX ORDER — TRANEXAMIC ACID 100 MG/ML
INJECTION, SOLUTION INTRAVENOUS CONTINUOUS PRN
Status: DISCONTINUED | OUTPATIENT
Start: 2025-03-11 | End: 2025-03-11

## 2025-03-11 RX ORDER — LIDOCAINE HYDROCHLORIDE AND EPINEPHRINE 10; 10 UG/ML; MG/ML
INJECTION, SOLUTION INFILTRATION; PERINEURAL
Status: DISCONTINUED | OUTPATIENT
Start: 2025-03-11 | End: 2025-03-11 | Stop reason: HOSPADM

## 2025-03-11 RX ORDER — EPHEDRINE SULFATE 50 MG/ML
INJECTION, SOLUTION INTRAVENOUS
Status: DISCONTINUED | OUTPATIENT
Start: 2025-03-11 | End: 2025-03-11

## 2025-03-11 RX ORDER — PROPOFOL 10 MG/ML
VIAL (ML) INTRAVENOUS CONTINUOUS PRN
Status: DISCONTINUED | OUTPATIENT
Start: 2025-03-11 | End: 2025-03-11

## 2025-03-11 RX ORDER — DIPHENHYDRAMINE HYDROCHLORIDE 50 MG/ML
25 INJECTION, SOLUTION INTRAMUSCULAR; INTRAVENOUS EVERY 4 HOURS PRN
Status: DISCONTINUED | OUTPATIENT
Start: 2025-03-11 | End: 2025-03-12

## 2025-03-11 RX ORDER — PHENYLEPHRINE HYDROCHLORIDE 10 MG/ML
INJECTION INTRAVENOUS
Status: DISCONTINUED | OUTPATIENT
Start: 2025-03-11 | End: 2025-03-11

## 2025-03-11 RX ORDER — NICOTINE 7MG/24HR
1 PATCH, TRANSDERMAL 24 HOURS TRANSDERMAL DAILY PRN
Status: DISCONTINUED | OUTPATIENT
Start: 2025-03-11 | End: 2025-03-17 | Stop reason: HOSPADM

## 2025-03-11 RX ADMIN — EPHEDRINE SULFATE 5 MG: 50 INJECTION INTRAVENOUS at 09:03

## 2025-03-11 RX ADMIN — ATORVASTATIN CALCIUM 20 MG: 20 TABLET, FILM COATED ORAL at 03:03

## 2025-03-11 RX ADMIN — SENNOSIDES AND DOCUSATE SODIUM 2 TABLET: 50; 8.6 TABLET ORAL at 09:03

## 2025-03-11 RX ADMIN — SODIUM CHLORIDE: 0.9 INJECTION, SOLUTION INTRAVENOUS at 06:03

## 2025-03-11 RX ADMIN — BACLOFEN 10 MG: 10 TABLET ORAL at 03:03

## 2025-03-11 RX ADMIN — SUCCINYLCHOLINE 140 MG: 20 INJECTION, SOLUTION INTRAMUSCULAR; INTRAVENOUS at 07:03

## 2025-03-11 RX ADMIN — PHENYLEPHRINE HYDROCHLORIDE 200 MCG: 10 INJECTION INTRAVENOUS at 07:03

## 2025-03-11 RX ADMIN — HYDROMORPHONE HYDROCHLORIDE 0.2 MG: 1 INJECTION, SOLUTION INTRAMUSCULAR; INTRAVENOUS; SUBCUTANEOUS at 01:03

## 2025-03-11 RX ADMIN — PHENYLEPHRINE HYDROCHLORIDE 0.4 MCG/KG/MIN: 10 INJECTION INTRAVENOUS at 07:03

## 2025-03-11 RX ADMIN — OXYCODONE 10 MG: 5 TABLET ORAL at 09:03

## 2025-03-11 RX ADMIN — SODIUM CHLORIDE 0.2 MCG/KG/MIN: 9 INJECTION, SOLUTION INTRAVENOUS at 07:03

## 2025-03-11 RX ADMIN — MIDAZOLAM HYDROCHLORIDE 2 MG: 2 INJECTION, SOLUTION INTRAMUSCULAR; INTRAVENOUS at 06:03

## 2025-03-11 RX ADMIN — Medication 0.5 MCG/KG/MIN: at 02:03

## 2025-03-11 RX ADMIN — ROCURONIUM BROMIDE 5 MG: 10 INJECTION, SOLUTION INTRAVENOUS at 07:03

## 2025-03-11 RX ADMIN — HYDROMORPHONE HYDROCHLORIDE 1 MG: 1 INJECTION, SOLUTION INTRAMUSCULAR; INTRAVENOUS; SUBCUTANEOUS at 11:03

## 2025-03-11 RX ADMIN — DIPHENHYDRAMINE HYDROCHLORIDE 25 MG: 50 INJECTION, SOLUTION INTRAMUSCULAR; INTRAVENOUS at 07:03

## 2025-03-11 RX ADMIN — ROCURONIUM BROMIDE 30 MG: 10 INJECTION, SOLUTION INTRAVENOUS at 08:03

## 2025-03-11 RX ADMIN — SODIUM CHLORIDE 1000 ML: 9 INJECTION, SOLUTION INTRAVENOUS at 02:03

## 2025-03-11 RX ADMIN — PROPOFOL 150 MCG/KG/MIN: 10 INJECTION, EMULSION INTRAVENOUS at 07:03

## 2025-03-11 RX ADMIN — ONDANSETRON 4 MG: 2 INJECTION INTRAMUSCULAR; INTRAVENOUS at 12:03

## 2025-03-11 RX ADMIN — Medication 10 MG: at 12:03

## 2025-03-11 RX ADMIN — FENTANYL CITRATE 100 MCG: 50 INJECTION, SOLUTION INTRAMUSCULAR; INTRAVENOUS at 07:03

## 2025-03-11 RX ADMIN — MUPIROCIN: 20 OINTMENT TOPICAL at 06:03

## 2025-03-11 RX ADMIN — Medication 10 MG: at 10:03

## 2025-03-11 RX ADMIN — Medication 400 MG: at 09:03

## 2025-03-11 RX ADMIN — PHENYLEPHRINE HYDROCHLORIDE 100 MCG: 10 INJECTION INTRAVENOUS at 02:03

## 2025-03-11 RX ADMIN — DEXAMETHASONE SODIUM PHOSPHATE 4 MG: 4 INJECTION, SOLUTION INTRAMUSCULAR; INTRAVENOUS at 07:03

## 2025-03-11 RX ADMIN — POLYETHYLENE GLYCOL 3350 17 G: 17 POWDER, FOR SOLUTION ORAL at 03:03

## 2025-03-11 RX ADMIN — NOREPINEPHRINE BITARTRATE 0.02 MCG/KG/MIN: 0.02 INJECTION, SOLUTION INTRAVENOUS at 02:03

## 2025-03-11 RX ADMIN — GLYCOPYRROLATE 0.2 MG: 0.2 INJECTION, SOLUTION INTRAMUSCULAR; INTRAVENOUS at 07:03

## 2025-03-11 RX ADMIN — EPHEDRINE SULFATE 10 MG: 50 INJECTION INTRAVENOUS at 09:03

## 2025-03-11 RX ADMIN — OXYCODONE 10 MG: 5 TABLET ORAL at 05:03

## 2025-03-11 RX ADMIN — HYDROMORPHONE HYDROCHLORIDE 1 MG: 1 INJECTION, SOLUTION INTRAMUSCULAR; INTRAVENOUS; SUBCUTANEOUS at 07:03

## 2025-03-11 RX ADMIN — TRANEXAMIC ACID 1 MG/KG/HR: 100 INJECTION INTRAVENOUS at 08:03

## 2025-03-11 RX ADMIN — CEFAZOLIN 2 G: 2 INJECTION, POWDER, FOR SOLUTION INTRAMUSCULAR; INTRAVENOUS at 07:03

## 2025-03-11 RX ADMIN — SUGAMMADEX 200 MG: 100 INJECTION, SOLUTION INTRAVENOUS at 01:03

## 2025-03-11 RX ADMIN — CEFAZOLIN 2 G: 2 INJECTION, POWDER, FOR SOLUTION INTRAMUSCULAR; INTRAVENOUS at 11:03

## 2025-03-11 RX ADMIN — LIDOCAINE HYDROCHLORIDE 60 MG: 20 INJECTION INTRAVENOUS at 07:03

## 2025-03-11 RX ADMIN — SODIUM CHLORIDE: 9 INJECTION, SOLUTION INTRAVENOUS at 06:03

## 2025-03-11 RX ADMIN — HYDROMORPHONE HYDROCHLORIDE 1 MG: 1 INJECTION, SOLUTION INTRAMUSCULAR; INTRAVENOUS; SUBCUTANEOUS at 03:03

## 2025-03-11 RX ADMIN — TRANEXAMIC ACID 1075 MG: 100 INJECTION INTRAVENOUS at 08:03

## 2025-03-11 RX ADMIN — CEFAZOLIN 1 G: 330 INJECTION, POWDER, FOR SOLUTION INTRAMUSCULAR; INTRAVENOUS at 07:03

## 2025-03-11 RX ADMIN — EPHEDRINE SULFATE 10 MG: 50 INJECTION INTRAVENOUS at 07:03

## 2025-03-11 RX ADMIN — ROCURONIUM BROMIDE 20 MG: 10 INJECTION, SOLUTION INTRAVENOUS at 08:03

## 2025-03-11 RX ADMIN — BACLOFEN 10 MG: 10 TABLET ORAL at 08:03

## 2025-03-11 RX ADMIN — SODIUM CHLORIDE: 9 INJECTION, SOLUTION INTRAVENOUS at 09:03

## 2025-03-11 RX ADMIN — PREGABALIN 150 MG: 150 CAPSULE ORAL at 08:03

## 2025-03-11 RX ADMIN — Medication 10 MG: at 09:03

## 2025-03-11 RX ADMIN — EPHEDRINE SULFATE 5 MG: 50 INJECTION INTRAVENOUS at 10:03

## 2025-03-11 RX ADMIN — Medication 10 MG: at 08:03

## 2025-03-11 RX ADMIN — Medication 10 MG: at 11:03

## 2025-03-11 RX ADMIN — OXYCODONE 10 MG: 5 TABLET ORAL at 02:03
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
## 2025-03-11 NOTE — BRIEF OP NOTE
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   Closure per plastic surgery, 2 deep HV, 2 superficial ANDREINA, prevena wound vac    Operative Findings: see full op note for further details    Estimated Blood Loss: 1500 mL         Specimens:   Specimen (24h ago, onward)      None

## 2025-03-11 NOTE — ASSESSMENT & PLAN NOTE
Fior Madrid Vanda has flat back deformity and lumbar stenosis with neurogenic claudication. Neuro exam stable. She has failed conservative management and would like to discuss surgical options.      She had cardiac stents placed in March/April 2024 and is on Plavix. [facility]. After discussion with cardiologist she will remain on aspirin 81mg through surgery.    Proceed to OR as planned

## 2025-03-11 NOTE — TRANSFER OF CARE
"Anesthesia Transfer of Care Note    Patient: Fior Mckeon    Procedure(s) Performed: Procedure(s) (LRB):  *AIRO* T10-PELVIS POSTERIOR INSTRUMENTED FUSION (N/A)  CLOSURE, SURGICAL WOUND OR DEHISCENCE, EXTENSIVE OR COMPLEX, SECONDARY    Patient location: ICU    Anesthesia Type: general    Transport from OR: Transported from OR on 6-10 L/min O2 by face mask with adequate spontaneous ventilation    Post pain: adequate analgesia    Post assessment: no apparent anesthetic complications    Post vital signs: stable    Level of consciousness: awake    Nausea/Vomiting: no nausea/vomiting    Complications: none    Transfer of care protocol was followed      Last vitals: Visit Vitals  /70 (BP Location: Right arm, Patient Position: Lying)   Pulse 60   Temp 36.5 °C (97.7 °F) (Temporal)   Resp 18   Ht 5' 8" (1.727 m)   Wt 107.5 kg (237 lb)   SpO2 98%   Breastfeeding No   BMI 36.04 kg/m²     "

## 2025-03-11 NOTE — HPI
HPI from 10/24/2024:  Patient presents for follow-up of low back pain. S/p L4-5 MIS laminectomy with Dr. Walters 10/23/23. Previous decompression at L5-S1 at OSH. Progressively worsening since 2020. She has tried L5-S1 facet injections, PT, and massage therapy without relief. The pain is constant. Occasional neck stiffness. Denies arm pain, leg pain, b/b dysfunction, hand clumsiness. She walks flexed forward as she is unable to stand upright without pain. She reports intermittent foot numbness following surgeries in both feet in the early 2000s. She underwent cardiac stent placement 3/2024 and is on plavix now. She states her cardiologist won't clear her until 1 year post-op to undergo spine fusion. She also uses nicotine but says she will quit in order to have surgery. [facility]      Interval Hx:   Today the patient presents to clinic to discuss surgery. Denies changes in symptoms since last visit. Continues to report debilitating back pain. She cannot walk long distances or stand for long periods of time without severe pain. She's been participating in pre-hab since the last visit. She denies any bowel/bladder issues.

## 2025-03-11 NOTE — OP NOTE
Date of surgery date of 03/11 25  Preoperative diagnosis spinal wound  Postoperative diagnosis same  Procedure performed bilateral paraspinous muscle flap closure of spinal wound  Surgeon Hafsa   Anesthesia general  Complications none   Blood loss minimal  Drains x2       After completion of the spinal portion of the procedure I entered the room.  Patient has a large incisional wound.  There was a dural leak which was sealed in the neurosurgical means wanted to have muscle flaps performed deep down into the defect.    The skin was then  from the overlying paraspinous muscles.  The deep fascia overlying the paraspinous muscle was incised about 5 cm lateral.  This freed up the muscle anteriorly.  Posterior dissection then freed up the paraspinous muscle on its lumbar perforators,; artery and vein thus the paraspinous muscle was completely elevated on there vascular supply.  Similar procedure was performed on the opposite paraspinous muscle.  Bilateral paraspinous muscle flaps were then transposed and in folded down into the defect thereby filling the entire defect.  The paraspinous muscles were closed using 0 PDS sutures and a running fashion.  The superficial fascia as well as the dermis were closed using a running 2-0 PDS suture.  Two drains had been placed.  Skin was closed using a running 3-0 Stratafix suture.

## 2025-03-11 NOTE — CARE UPDATE
Neurosurgery Post op Check Note:     Vitals:    03/11/25 0552   BP: 109/70   Pulse: 60   Resp: 18   Temp: 97.7 °F (36.5 °C)     S/p T11-Pelvis fusion    Exam:   Immediate post op state waking up from anesthetic  moving distal extremities antigravity KE, 4/5 DF/PF   Sensation present to light touch    Plan:   Admit to ICU  Resuscitation: s/p 2U PRBC/PLTS:   Obtain post op CBC, coags, fibrinogen, cmp  Monitor Urine output  Replete as necessary  NS@125  Advance diet  HV Drains to suction, prevena and ANDREINA drains per plastic surgery  XR TL Spine tomorrow  TLSO Brace wear when OOB  PT/OT    Bennett Collins MD  NSGY PGY 7

## 2025-03-11 NOTE — ANESTHESIA PROCEDURE NOTES
Intubation    Date/Time: 3/11/2025 7:19 AM    Performed by: Daily Herrmann CRNA  Authorized by: Cade Morrison MD    Intubation:     Induction:  Intravenous    Intubated:  Postinduction    Mask Ventilation:  Not attempted    Attempts:  1    Attempted By:  Student    Method of Intubation:  Video laryngoscopy    Blade:  Calderón 3    Laryngeal View Grade: Grade I - full view of cords      Difficult Airway Encountered?: No      Complications:  None    Airway Device:  Oral endotracheal tube    Airway Device Size:  7.0    Style/Cuff Inflation:  Cuffed (inflated to minimal occlusive pressure)    Tube secured:  22    Secured at:  The lips    Placement Verified By:  Capnometry    Complicating Factors:  None    Findings Post-Intubation:  BS equal bilateral and atraumatic/condition of teeth unchanged

## 2025-03-11 NOTE — ANESTHESIA POSTPROCEDURE EVALUATION
Anesthesia Post Evaluation    Patient: Fior Mckeon    Procedure(s) Performed: Procedure(s) (LRB):  *AIRO* T10-PELVIS POSTERIOR INSTRUMENTED FUSION (N/A)  CLOSURE, SURGICAL WOUND OR DEHISCENCE, EXTENSIVE OR COMPLEX, SECONDARY    Final Anesthesia Type: general      Patient participation: Yes- Able to Participate  Level of consciousness: awake and alert  Post-procedure vital signs: reviewed and stable  Pain control: Pain has been treated.  Airway patency: patent    PONV status: Absent or treated.  Anesthetic complications: no      Cardiovascular status: hemodynamically stable  Respiratory status: unassisted  Hydration status: euvolemic  Follow-up not needed.              Vitals Value Taken Time   /66 03/11/25 16:17   Temp 36.4 °C (97.5 °F) 03/11/25 15:00   Pulse 71 03/11/25 16:21   Resp 7 03/11/25 16:21   SpO2 99 % 03/11/25 16:21   Vitals shown include unfiled device data.      No case tracking events are documented in the log.      Pain/Alysha Score: Pain Rating Prior to Med Admin: 10 (3/11/2025  3:34 PM)

## 2025-03-11 NOTE — H&P
[facility] - Surgery ([facility])  Neuorsurgery  History and Physical     Patient Name: Fior Mckeon  MRN: [id number]  Admission Date: 3/11/2025  Attending Physician: Sharad Coe MD   Primary Care Physician: Matias Downs MD    Patient information was obtained from patient and ER records.     Subjective:     Chief Complaint: No chief complaint on file.       HPI:  HPI from 10/24/2024:  Patient presents for follow-up of low back pain. S/p L4-5 MIS laminectomy with Dr. Walters 10/23/23. Previous decompression at L5-S1 at OSH. Progressively worsening since 2020. She has tried L5-S1 facet injections, PT, and massage therapy without relief. The pain is constant. Occasional neck stiffness. Denies arm pain, leg pain, b/b dysfunction, hand clumsiness. She walks flexed forward as she is unable to stand upright without pain. She reports intermittent foot numbness following surgeries in both feet in the early 2000s. She underwent cardiac stent placement 3/2024 and is on plavix now. She states her cardiologist won't clear her until 1 year post-op to undergo spine fusion. She also uses nicotine but says she will quit in order to have surgery. [facility]      Interval Hx:   Today the patient presents to clinic to discuss surgery. Denies changes in symptoms since last visit. Continues to report debilitating back pain. She cannot walk long distances or stand for long periods of time without severe pain. She's been participating in pre-hab since the last visit. She denies any bowel/bladder issues.        Prescriptions Prior to Admission[1]    Review of patient's allergies indicates:   Allergen Reactions    Adhesive tape-silicones      Tegaderm ok per Pt. Pt reports high allergy to paper tape as well    Codeine      Has to take benadryl with      Flunisolide      Unsure of reaction    Gabapentin Other (See Comments)     [place] drowsy    Hydrocodone Itching     Can take with  benadryl    Oxycodone Itching     Can take with benadryl        Past Medical History:   Diagnosis Date    Arthritis     Bulging lumbar disc     Cancer     Osteoarthritis      Past Surgical History:   Procedure Laterality Date    ABDOMINOPLASTY      APPENDECTOMY      BACK SURGERY      breast aug Bilateral     CHOLECYSTECTOMY      FOOT SURGERY      GANGLION CYST EXCISION      GASTRIC BYPASS      HIP SURGERY      HYSTERECTOMY      KNEE SURGERY      LUMBAR LAMINECTOMY N/A 10/23/2023    Procedure: MINIMALLY INVASIVE LAMINECTOMY, SPINE, LUMBAR L4-5;  Surgeon: Adam Walters MD;  Location: [facility] OR [address] [place] [address];  Service: Neurosurgery;  Laterality: N/A;    SHOULDER SURGERY      TOTAL REDUCTION MAMMOPLASTY       Family History       Problem Relation (Age of Onset)    Cancer Father    Diabetes Father    Hypertension Mother, Father          Tobacco Use    Smoking status: Every Day     Current packs/day: 0.50     Average packs/day: 0.5 packs/day for 1.9 years (0.9 ttl pk-yrs)     Types: Vaping with nicotine, Cigarettes     Start date: 4/1/2023    Smokeless tobacco: Never   Substance and Sexual Activity    Alcohol use: Yes    Drug use: Never    Sexual activity: Not on file     Review of Systems  Objective:     Weight: 107.5 kg (237 lb)  Body mass index is 36.04 kg/m².  Vital Signs (Most Recent):  Temp: 97.7 °F (36.5 °C) (03/11/25 0552)  Pulse: 60 (03/11/25 0552)  Resp: 18 (03/11/25 0552)  BP: 109/70 (03/11/25 0552)  SpO2: 98 % (03/11/25 0552) Vital Signs (24h Range):  Temp:  [97.7 °F (36.5 °C)] 97.7 °F (36.5 °C)  Pulse:  [60] 60  Resp:  [18] 18  SpO2:  [98 %] 98 %  BP: (109)/(70) 109/70                                 Physical Exam         Neurosurgery Physical Exam    Physical Exam:  General: well developed, well nourished, no distress.   Head: normocephalic, atraumatic  Neurologic: Alert and oriented. Thought content appropriate.  GCS: Motor: 6/Verbal: 5/Eyes: 4 GCS Total: 15  Mental Status: Awake, Alert, Oriented x 4  Language: No  "aphasia  Speech: No dysarthria  Cranial nerves: face symmetric, tongue midline, CN II-XII grossly intact.   Eyes: pupils equal, round, reactive to light with accomodation, EOMI.  Pulmonary: normal respirations, no signs of respiratory distress  Sensory: intact to light touch throughout  Motor Strength: Moves all extremities spontaneously with good tone.  Full strength upper and lower extremities. No abnormal movements seen.      Strength   Deltoids Triceps Biceps Wrist Extension Wrist Flexion Hand    Upper: R 5/5 5/5 5/5 5/5 5/5 5/5     L 5/5 5/5 5/5 5/5 5/5 5/5       Iliopsoas Quadriceps Knee  Flexion Tibialis  anterior Gastro- cnemius EHL   Lower: R 5/5 5/5 5/5 5/5 5/5 5/5     L 5/5 5/5 5/5 5/5 5/5 5/5      Mcduffie: absent  Clonus: absent  Skin: Skin is warm, dry and intact.  Gait: antalgic 2/2 pain    Significant Labs:  No results for input(s): "GLU", "NA", "K", "CL", "CO2", "BUN", "CREATININE", "CALCIUM", "MG" in the last 48 hours.  No results for input(s): "WBC", "HGB", "HCT", "PLT" in the last 48 hours.  No results for input(s): "LABPT", "INR", "APTT" in the last 48 hours.  Microbiology Results (last 7 days)       ** No results found for the last 168 hours. **          All pertinent labs from the last 24 hours have been reviewed.    Significant Diagnostics:  I have reviewed all pertinent imaging results/findings within the past 24 hours.    Assessment and Plan:     * Spinal stenosis of lumbar region without neurogenic claudication  Fior Dc has flat back deformity and lumbar stenosis with neurogenic claudication. Neuro exam stable. She has failed conservative management and would like to discuss surgical options.      She had cardiac stents placed in March/April 2024 and is on Plavix. [facility] After discussion with cardiologist she will remain on aspirin 81mg through surgery.    Proceed to OR as planned        Gema Vargas MD  Neurosurgery  [facility] - " Surgery (2nd Fl)           [1]   Medications Prior to Admission   Medication Sig Dispense Refill Last Dose/Taking    acetaminophen (TYLENOL) 500 MG tablet Take 2 tablets (1,000 mg total) by mouth every 8 (eight) hours.  0 3/11/2025 at  3:45 AM    apple cider vinegar 250 mg Chew Take by mouth once daily.   Past Month    ascorbic acid, vitamin C, (VITAMIN C) 250 MG tablet Take 250 mg by mouth once daily.   Past Month    aspirin (ECOTRIN) 81 MG EC tablet Take 81 mg by mouth once. On hold (Patient taking differently: Take 81 mg by mouth once. On hold until after surgery)   3/11/2025 at  3:45 AM    atorvastatin 20 mg/5 mL (4 mg/mL) Susp Take 20 mg by mouth once daily. On hold until after surgery   3/10/2025 at  9:00 PM    baclofen (LIORESAL) 20 MG tablet 10 mg. TAKES 10 MG   3/11/2025 at  3:45 AM    carboxymethylcellulose (REFRESH PLUS) 0.5 % Dpet 1 drop 3 (three) times daily as needed.   3/10/2025 at  9:00 PM    carboxymethylcellulose (REFRESH PLUS) 0.5 % Dpet 1 drop 3 (three) times daily as needed.   3/10/2025 at  9:00 PM    clotrimazole (LOTRIMIN) 1 % cream APPLY MODERATE AMOUNT TOPICALLY TWICE A DAY FOR FUNGAL INFECTION   Past Month    ergocalciferol, vitamin D2, 10 mcg (400 unit) Tab Take 400 Units by mouth once a week.   Past Month    ferrous gluconate (FERGON) 324 MG tablet Take 324 mg by mouth daily with breakfast.   Past Month    Lactobacillus rhamnosus GG (CULTURELLE) 10 billion cell capsule Take 1 capsule by mouth once daily.   3/10/2025 at  9:00 PM    levocetirizine (XYZAL) 5 MG tablet Take 5 mg by mouth every evening.   3/10/2025 at  9:00 PM    magnesium oxide (MAG-OX) 400 mg (241.3 mg magnesium) tablet Take 400 mg by mouth once daily.   3/10/2025 at  9:00 PM    metoprolol succinate (TOPROL-XL) 25 MG 24 hr tablet TAKE 12.5 MG (ONE-HALF TABLET) BY MOUTH EVERY DAY FOR CHRONIC HEART FAILURE   3/11/2025 at  3:45 AM    pantoprazole (PROTONIX) 20 MG tablet Take 20 mg by mouth once daily.   3/11/2025 at  3:45 AM     pregabalin (LYRICA) 150 MG capsule Take 150 mg by mouth 2 (two) times daily.   3/11/2025 at  3:45 AM    senna (SENOKOT) 8.6 mg tablet Take 8.6 mg by mouth.   3/10/2025    spironolactone (ALDACTONE) 25 MG tablet Take 25 mg by mouth.   3/10/2025    traMADoL (ULTRAM) 50 mg tablet Take 1 tablet (50 mg total) by mouth every 8 (eight) hours as needed for Pain. 42 tablet 0 3/11/2025 at  3:45 AM    triamcinolone acetonide 0.1% (KENALOG) 0.1 % cream Apply topically 2 (two) times daily. prn   Past Month    valsartan (DIOVAN) 40 MG tablet 40 mg once daily.   3/10/2025 at  9:00 AM    zinc sulfate (ZINCATE) 50 mg zinc (220 mg) capsule Take 1 capsule by mouth once daily.   Past Month    baclofen (LIORESAL) 10 MG tablet Take 10 mg by mouth 3 (three) times daily. (Patient not taking: Reported on 1/9/2025)       empagliflozin (JARDIANCE ORAL)    3/7/2025 at  9:00 AM    furosemide (LASIX) 40 MG tablet 20 mg as needed.   More than a month

## 2025-03-11 NOTE — ANESTHESIA PROCEDURE NOTES
Arterial    Diagnosis: DDD    Patient location during procedure: done in OR    Staffing  Authorizing Provider: Cade Morrison MD  Performing Provider: Cade Morrison MD    Staffing  Performed by: Cade Morrison MD  Authorized by: Cade Morrison MD    Anesthesiologist was present at the time of the procedure.  Arterial  Skin Prep: chlorhexidine gluconate  Local Infiltration: none  Orientation: right  Location: radial    Catheter Size: 20 G  Catheter placement by Ultrasound guidance. Heme positive aspiration all ports.   Vessel Caliber: patent  Needle advanced into vessel with real time Ultrasound guidance.Insertion Attempts: 1  Assessment  Dressing: secured with tape and tegaderm  Additional Notes  First a-line dislodged during turn

## 2025-03-11 NOTE — PLAN OF CARE
"[facility] Care Plan  POC reviewed with Fior Mckeon and family at 1400. Patient and Family verbalized understanding. Questions and concerns addressed. No acute events today. Pt progressing toward goals. Will continue to monitor. See below and flowsheets for full assessment and VS info.   -parul gtt off  -narvaez in place  -RBC infusion complete  -oxy given x2, dilaudid given x1  -arterial line accidentally pulled out by pt  -TSLO brace fitted and on          Is this a stroke patient? no    Neuro:  [place] Coma Scale  Best Eye Response: 4-->(E4) spontaneous  Best Motor Response: 6-->(M6) obeys commands  Best Verbal Response: 5-->(V5) oriented  [place] Coma Scale Score: 15  Assessment Qualifiers: no eye obstruction present  Pupil PERRLA: yes     24 hr Temp:  [97.5 °F (36.4 °C)-97.7 °F (36.5 °C)]     CV:   Rhythm: normal sinus rhythm  BP goals:   SBP <  no orders  MAP > 65    Resp:           Plan: N/A    GI/:     Diet/Nutrition Received: regular  Last Bowel Movement: 03/11/25  Voiding Characteristics: urethral catheter (bladder)    Intake/Output Summary (Last 24 hours) at 3/11/2025 1817  Last data filed at 3/11/2025 1800  Gross per 24 hour   Intake 6613.39 ml   Output 2820 ml   Net 3793.39 ml          Labs/Accuchecks:  Recent Labs   Lab 03/11/25  1457   WBC 62.71*   RBC 3.75*   HGB 11.3*   HCT 36.2*         Recent Labs   Lab 03/11/25  1405      K 4.8   CO2 19*   *   BUN 20   CREATININE 0.8   ALKPHOS 85   ALT 31   AST 54*   BILITOT 0.6      Recent Labs   Lab 03/11/25  1405   INR 1.1   APTT 25.6    No results for input(s): "CPK", "CPKMB", "TROPONINI", "MB" in the last 168 hours.    Electrolytes: N/A - electrolytes WDL  Accuchecks: none    Gtts:   0.9% NaCl   Intravenous Continuous 75 mL/hr at 03/11/25 1800 New Bag at 03/11/25 1800    NORepinephrine bitartrate-NaCl  0-0.2 mcg/kg/min Intravenous Continuous   Stopped at 03/11/25 1500    PHENYLephrine HCl in 0.9% NaCl  0-3 mcg/kg/min Intravenous " Continuous   Stopped at 03/11/25 5385       LDA/Wounds:    Nik Risk Assessment  Sensory Perception: 4-->no impairment  Moisture: 4-->rarely moist  Activity: 1-->bedfast  Mobility: 2-->very limited  Nutrition: 3-->adequate  Friction and Shear: 3-->no apparent problem  Nik Score: 17    Is your nik score 12 or less? no

## 2025-03-11 NOTE — NURSING
Pt hypotensive with a MAP of 38. Ramirez Carvajal at bedside. 1 parul bump given and levo gtt started. MD Marilee to bedside. No improvement in BP. 2 more parul bumps given and parul gtt started. 500cc NS bolus given. 10mg Oxy given for pain. 1 unit RBCs given. Levo gtt turned off and parul gtt continued. Pt on 2L NC. Son updated.

## 2025-03-11 NOTE — SUBJECTIVE & OBJECTIVE
Past Medical History:   Diagnosis Date    Arthritis     Bulging lumbar disc     Cancer     Osteoarthritis      Past Surgical History:   Procedure Laterality Date    ABDOMINOPLASTY      APPENDECTOMY      BACK SURGERY      breast aug Bilateral     CHOLECYSTECTOMY      FOOT SURGERY      GANGLION CYST EXCISION      GASTRIC BYPASS      HIP SURGERY      HYSTERECTOMY      KNEE SURGERY      LUMBAR LAMINECTOMY N/A 10/23/2023    Procedure: MINIMALLY INVASIVE LAMINECTOMY, SPINE, LUMBAR L4-5;  Surgeon: Adam Walters MD;  Location: [facility] OR [address] [place] [address];  Service: Neurosurgery;  Laterality: N/A;    SHOULDER SURGERY      TOTAL REDUCTION MAMMOPLASTY        Medications Ordered Prior to Encounter[1]   Allergies: Adhesive tape-silicones, Codeine, Flunisolide, Gabapentin, Hydrocodone, and Oxycodone  Family History   Problem Relation Name Age of Onset    Hypertension Mother      Hypertension Father      Diabetes Father      Cancer Father       Social History[2]  Review of Systems   Constitutional:  Negative for chills, fatigue and fever.   Respiratory:  Negative for cough, chest tightness and shortness of breath.    Gastrointestinal:  Negative for abdominal distention, abdominal pain, diarrhea, nausea and vomiting.   Genitourinary:  Negative for dysuria.   Musculoskeletal:  Positive for back pain.   Neurological:  Negative for weakness, light-headedness, numbness and headaches.   Psychiatric/Behavioral:  The patient is nervous/anxious.      Objective:     Vitals:    Temp: 97.6 °F (36.4 °C)  Pulse: 80  Rhythm: normal sinus rhythm  BP: 119/69  MAP (mmHg): 89  Resp: 19  SpO2: 99 %    Temp  Min: 97.5 °F (36.4 °C)  Max: 97.7 °F (36.5 °C)  Pulse  Min: 60  Max: 88  BP  Min: 60/32  Max: 119/69  MAP (mmHg)  Min: 40  Max: 89  Resp  Min: 11  Max: 37  SpO2  Min: 94 %  Max: 100 %    No intake/output data recorded.            Physical Exam  Vitals and nursing note reviewed.   Constitutional:       General: She is not in acute distress.     Appearance:  She is obese.   HENT:      Head: Normocephalic and atraumatic.   Eyes:      General: No scleral icterus.     Extraocular Movements: Extraocular movements intact.   Cardiovascular:      Rate and Rhythm: Normal rate and regular rhythm.      Pulses: Normal pulses.      Heart sounds: Normal heart sounds. No murmur heard.  Pulmonary:      Effort: Pulmonary effort is normal. No respiratory distress.      Breath sounds: Normal breath sounds. No wheezing or rales.   Chest:      Comments: TLSO brace in place  Abdominal:      General: Abdomen is flat. There is no distension.      Palpations: Abdomen is soft.      Tenderness: There is no abdominal tenderness.   Musculoskeletal:      Right lower leg: No edema.      Left lower leg: No edema.      Comments: Bilateral ANDREINA and accordian drains, wound vac posteriorly   Neurological:      Mental Status: She is alert and oriented to person, place, and time.      GCS: GCS eye subscore is 4. GCS verbal subscore is 5. GCS motor subscore is 6.      Motor: No weakness.      Comments: 5/5 strength in all extremities  No sensory deficits            Today I personally reviewed pertinent medications, lines/drains/airways, imaging, laboratory results, :             [1]   No current facility-administered medications on file prior to encounter.     Current Outpatient Medications on File Prior to Encounter   Medication Sig Dispense Refill    acetaminophen (TYLENOL) 500 MG tablet Take 2 tablets (1,000 mg total) by mouth every 8 (eight) hours.  0    apple cider vinegar 250 mg Chew Take by mouth once daily.      ascorbic acid, vitamin C, (VITAMIN C) 250 MG tablet Take 250 mg by mouth once daily.      aspirin (ECOTRIN) 81 MG EC tablet Take 81 mg by mouth once. On hold (Patient taking differently: Take 81 mg by mouth once. On hold until after surgery)      atorvastatin 20 mg/5 mL (4 mg/mL) Susp Take 20 mg by mouth once daily. On hold until after surgery      baclofen (LIORESAL) 20 MG tablet 10 mg. TAKES  10 MG      carboxymethylcellulose (REFRESH PLUS) 0.5 % Dpet 1 drop 3 (three) times daily as needed.      carboxymethylcellulose (REFRESH PLUS) 0.5 % Dpet 1 drop 3 (three) times daily as needed.      clotrimazole (LOTRIMIN) 1 % cream APPLY MODERATE AMOUNT TOPICALLY TWICE A DAY FOR FUNGAL INFECTION      ergocalciferol, vitamin D2, 10 mcg (400 unit) Tab Take 400 Units by mouth once a week.      ferrous gluconate (FERGON) 324 MG tablet Take 324 mg by mouth daily with breakfast.      Lactobacillus rhamnosus GG (CULTURELLE) 10 billion cell capsule Take 1 capsule by mouth once daily.      levocetirizine (XYZAL) 5 MG tablet Take 5 mg by mouth every evening.      magnesium oxide (MAG-OX) 400 mg (241.3 mg magnesium) tablet Take 400 mg by mouth once daily.      metoprolol succinate (TOPROL-XL) 25 MG 24 hr tablet TAKE 12.5 MG (ONE-HALF TABLET) BY MOUTH EVERY DAY FOR CHRONIC HEART FAILURE      pantoprazole (PROTONIX) 20 MG tablet Take 20 mg by mouth once daily.      pregabalin (LYRICA) 150 MG capsule Take 150 mg by mouth 2 (two) times daily.      senna (SENOKOT) 8.6 mg tablet Take 8.6 mg by mouth.      spironolactone (ALDACTONE) 25 MG tablet Take 25 mg by mouth.      traMADoL (ULTRAM) 50 mg tablet Take 1 tablet (50 mg total) by mouth every 8 (eight) hours as needed for Pain. 42 tablet 0    triamcinolone acetonide 0.1% (KENALOG) 0.1 % cream Apply topically 2 (two) times daily. prn      valsartan (DIOVAN) 40 MG tablet 40 mg once daily.      zinc sulfate (ZINCATE) 50 mg zinc (220 mg) capsule Take 1 capsule by mouth once daily.      baclofen (LIORESAL) 10 MG tablet Take 10 mg by mouth 3 (three) times daily. (Patient not taking: Reported on 1/9/2025)      empagliflozin (JARDIANCE ORAL)       furosemide (LASIX) 40 MG tablet 20 mg as needed.     [2]   Social History  Tobacco Use    Smoking status: Every Day     Current packs/day: 0.50     Average packs/day: 0.5 packs/day for 1.9 years (0.9 ttl pk-yrs)     Types: Vaping with nicotine,  Cigarettes     Start date: 4/1/2023    Smokeless tobacco: Never   Substance Use Topics    Alcohol use: Yes    Drug use: Never

## 2025-03-11 NOTE — BRIEF OP NOTE
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

## 2025-03-11 NOTE — ANESTHESIA PROCEDURE NOTES
Arterial    Diagnosis: lumbar stenosis    Patient location during procedure: done in OR    Staffing  Authorizing Provider: Cade Morrison MD  Performing Provider: Cade Morrison MD    Staffing  Performed by: Cade Morrison MD  Authorized by: Cade Morrison MD    Anesthesiologist was present at the time of the procedure.  Arterial  Skin Prep: chlorhexidine gluconate  Local Infiltration: none  Orientation: right  Location: radial    Catheter Size: 20 G  Catheter placement by Ultrasound guidance. Heme positive aspiration all ports.   Vessel Caliber: patent  Vascular Doppler:  not done  Needle advanced into vessel with real time Ultrasound guidance.Insertion Attempts: 1  Assessment  Dressing: secured with tape and tegaderm

## 2025-03-11 NOTE — NURSING
Patient arrived to [facility] from [facility]    Report received from: Daily [facility]    Type of stroke/diagnosis:  T10-pelvis fusion    Current symptoms: spont mvts x4. Follows commands, oriented x4, hypotensive, 10/10 back pain    Skin Assessment done: Yes  Wounds noted: spinal incision  *If wounds noted, was Wound Care consulted? N/A  *If wounds noted, LDA placed? Yes  Skin Assessment Verified by: BRYAN Gonzalez Completed? Yes, passed    Patient Belongings on Admit: none    NCC notified: Wei MDs

## 2025-03-11 NOTE — SUBJECTIVE & OBJECTIVE
Prescriptions Prior to Admission[1]    Review of patient's allergies indicates:   Allergen Reactions    Adhesive tape-silicones      Tegaderm ok per Pt. Pt reports high allergy to paper tape as well    Codeine      Has to take benadryl with      Flunisolide      Unsure of reaction    Gabapentin Other (See Comments)     Bivins drowsy    Hydrocodone Itching     Can take with benadryl    Oxycodone Itching     Can take with benadryl        Past Medical History:   Diagnosis Date    Arthritis     Bulging lumbar disc     Cancer     Osteoarthritis      Past Surgical History:   Procedure Laterality Date    ABDOMINOPLASTY      APPENDECTOMY      BACK SURGERY      breast aug Bilateral     CHOLECYSTECTOMY      FOOT SURGERY      GANGLION CYST EXCISION      GASTRIC BYPASS      HIP SURGERY      HYSTERECTOMY      KNEE SURGERY      LUMBAR LAMINECTOMY N/A 10/23/2023    Procedure: MINIMALLY INVASIVE LAMINECTOMY, SPINE, LUMBAR L4-5;  Surgeon: Adam Walters MD;  Location: [facility] OR [address] [place] [address];  Service: Neurosurgery;  Laterality: N/A;    SHOULDER SURGERY      TOTAL REDUCTION MAMMOPLASTY       Family History       Problem Relation (Age of Onset)    Cancer Father    Diabetes Father    Hypertension Mother, Father          Tobacco Use    Smoking status: Every Day     Current packs/day: 0.50     Average packs/day: 0.5 packs/day for 1.9 years (0.9 ttl pk-yrs)     Types: Vaping with nicotine, Cigarettes     Start date: 4/1/2023    Smokeless tobacco: Never   Substance and Sexual Activity    Alcohol use: Yes    Drug use: Never    Sexual activity: Not on file     Review of Systems  Objective:     Weight: 107.5 kg (237 lb)  Body mass index is 36.04 kg/m².  Vital Signs (Most Recent):  Temp: 97.7 °F (36.5 °C) (03/11/25 0552)  Pulse: 60 (03/11/25 0552)  Resp: 18 (03/11/25 0552)  BP: 109/70 (03/11/25 0552)  SpO2: 98 % (03/11/25 0552) Vital Signs (24h Range):  Temp:  [97.7 °F (36.5 °C)] 97.7 °F (36.5 °C)  Pulse:  [60] 60  Resp:  [18] 18  SpO2:  [98 %] 98  "%  BP: (109)/(70) 109/70                                 Physical Exam         Neurosurgery Physical Exam    Physical Exam:  General: well developed, well nourished, no distress.   Head: normocephalic, atraumatic  Neurologic: Alert and oriented. Thought content appropriate.  GCS: Motor: 6/Verbal: 5/Eyes: 4 GCS Total: 15  Mental Status: Awake, Alert, Oriented x 4  Language: No aphasia  Speech: No dysarthria  Cranial nerves: face symmetric, tongue midline, CN II-XII grossly intact.   Eyes: pupils equal, round, reactive to light with accomodation, EOMI.  Pulmonary: normal respirations, no signs of respiratory distress  Sensory: intact to light touch throughout  Motor Strength: Moves all extremities spontaneously with good tone.  Full strength upper and lower extremities. No abnormal movements seen.      Strength   Deltoids Triceps Biceps Wrist Extension Wrist Flexion Hand    Upper: R 5/5 5/5 5/5 5/5 5/5 5/5     L 5/5 5/5 5/5 5/5 5/5 5/5       Iliopsoas Quadriceps Knee  Flexion Tibialis  anterior Gastro- cnemius EHL   Lower: R 5/5 5/5 5/5 5/5 5/5 5/5     L 5/5 5/5 5/5 5/5 5/5 5/5      Mcduffie: absent  Clonus: absent  Skin: Skin is warm, dry and intact.  Gait: antalgic 2/2 pain    Significant Labs:  No results for input(s): "GLU", "NA", "K", "CL", "CO2", "BUN", "CREATININE", "CALCIUM", "MG" in the last 48 hours.  No results for input(s): "WBC", "HGB", "HCT", "PLT" in the last 48 hours.  No results for input(s): "LABPT", "INR", "APTT" in the last 48 hours.  Microbiology Results (last 7 days)       ** No results found for the last 168 hours. **          All pertinent labs from the last 24 hours have been reviewed.    Significant Diagnostics:  I have reviewed all pertinent imaging results/findings within the past 24 hours.       [1]   Medications Prior to Admission   Medication Sig Dispense Refill Last Dose/Taking    acetaminophen (TYLENOL) 500 MG tablet Take 2 tablets (1,000 mg total) by mouth every 8 (eight) hours.  0 " 3/11/2025 at  3:45 AM    apple cider vinegar 250 mg Chew Take by mouth once daily.   Past Month    ascorbic acid, vitamin C, (VITAMIN C) 250 MG tablet Take 250 mg by mouth once daily.   Past Month    aspirin (ECOTRIN) 81 MG EC tablet Take 81 mg by mouth once. On hold (Patient taking differently: Take 81 mg by mouth once. On hold until after surgery)   3/11/2025 at  3:45 AM    atorvastatin 20 mg/5 mL (4 mg/mL) Susp Take 20 mg by mouth once daily. On hold until after surgery   3/10/2025 at  9:00 PM    baclofen (LIORESAL) 20 MG tablet 10 mg. TAKES 10 MG   3/11/2025 at  3:45 AM    carboxymethylcellulose (REFRESH PLUS) 0.5 % Dpet 1 drop 3 (three) times daily as needed.   3/10/2025 at  9:00 PM    carboxymethylcellulose (REFRESH PLUS) 0.5 % Dpet 1 drop 3 (three) times daily as needed.   3/10/2025 at  9:00 PM    clotrimazole (LOTRIMIN) 1 % cream APPLY MODERATE AMOUNT TOPICALLY TWICE A DAY FOR FUNGAL INFECTION   Past Month    ergocalciferol, vitamin D2, 10 mcg (400 unit) Tab Take 400 Units by mouth once a week.   Past Month    ferrous gluconate (FERGON) 324 MG tablet Take 324 mg by mouth daily with breakfast.   Past Month    Lactobacillus rhamnosus GG (CULTURELLE) 10 billion cell capsule Take 1 capsule by mouth once daily.   3/10/2025 at  9:00 PM    levocetirizine (XYZAL) 5 MG tablet Take 5 mg by mouth every evening.   3/10/2025 at  9:00 PM    magnesium oxide (MAG-OX) 400 mg (241.3 mg magnesium) tablet Take 400 mg by mouth once daily.   3/10/2025 at  9:00 PM    metoprolol succinate (TOPROL-XL) 25 MG 24 hr tablet TAKE 12.5 MG (ONE-HALF TABLET) BY MOUTH EVERY DAY FOR CHRONIC HEART FAILURE   3/11/2025 at  3:45 AM    pantoprazole (PROTONIX) 20 MG tablet Take 20 mg by mouth once daily.   3/11/2025 at  3:45 AM    pregabalin (LYRICA) 150 MG capsule Take 150 mg by mouth 2 (two) times daily.   3/11/2025 at  3:45 AM    senna (SENOKOT) 8.6 mg tablet Take 8.6 mg by mouth.   3/10/2025    spironolactone (ALDACTONE) 25 MG tablet Take 25 mg  by mouth.   3/10/2025    traMADoL (ULTRAM) 50 mg tablet Take 1 tablet (50 mg total) by mouth every 8 (eight) hours as needed for Pain. 42 tablet 0 3/11/2025 at  3:45 AM    triamcinolone acetonide 0.1% (KENALOG) 0.1 % cream Apply topically 2 (two) times daily. prn   Past Month    valsartan (DIOVAN) 40 MG tablet 40 mg once daily.   3/10/2025 at  9:00 AM    zinc sulfate (ZINCATE) 50 mg zinc (220 mg) capsule Take 1 capsule by mouth once daily.   Past Month    baclofen (LIORESAL) 10 MG tablet Take 10 mg by mouth 3 (three) times daily. (Patient not taking: Reported on 1/9/2025)       empagliflozin (JARDIANCE ORAL)    3/7/2025 at  9:00 AM    furosemide (LASIX) 40 MG tablet 20 mg as needed.   More than a month

## 2025-03-11 NOTE — ANESTHESIA PREPROCEDURE EVALUATION
03/11/2025  Fior Mckeon is a 64 y.o., female.Problem List[1]        Pre-op Assessment    I have reviewed the Patient Summary Reports.       I have reviewed the Medications.     Review of Systems  Anesthesia Hx:  No problems with previous Anesthesia               Denies Personal Hx of Anesthesia complications.                    Musculoskeletal:  Arthritis        Arthritis          Neurological:       Seizures        Arthritis      Seizure Disorder                          Psych:  Psychiatric History                     Anesthesia Plan  Type of Anesthesia, risks & benefits discussed:    Anesthesia Type: Gen ETT  Intra-op Monitoring Plan: Art Line  Informed Consent: Informed consent signed with the Patient and all parties understand the risks and agree with anesthesia plan.  All questions answered.   ASA Score: 3  Anesthesia Plan Notes: Chart reviewed. Patient seen and examined. Anesthesia plan discussed and questions answered. E-consent signed. Cade Morrison MD    Ready For Surgery From Anesthesia Perspective.     .           [1]   Patient Active Problem List  Diagnosis    Spinal stenosis of lumbar region without neurogenic claudication    CLL (chronic lymphocytic leukemia)    Seizure disorder    Depression    Class 2 obesity due to excess calories with body mass index (BMI) of 37.0 to 37.9 in adult    DDD (degenerative disc disease), lumbar    Leukocytosis    Hyperglycemia    Balance problem    Decreased functional mobility and endurance    Pain aggravated by activities of daily living

## 2025-03-12 PROBLEM — M41.9 SCOLIOSIS: Status: ACTIVE | Noted: 2025-03-12

## 2025-03-12 PROBLEM — M19.90 OSTEOARTHRITIS: Status: ACTIVE | Noted: 2025-03-12

## 2025-03-12 LAB
ALBUMIN SERPL BCP-MCNC: 2.9 G/DL (ref 3.5–5.2)
ALP SERPL-CCNC: 77 U/L (ref 40–150)
ALT SERPL W/O P-5'-P-CCNC: 26 U/L (ref 10–44)
ANION GAP SERPL CALC-SCNC: 7 MMOL/L (ref 8–16)
AST SERPL-CCNC: 46 U/L (ref 10–40)
AV AREA BY CONTINUOUS VTI: 1.3 CM2
AV INDEX (PROSTH): 0.41
AV LVOT MEAN GRADIENT: 2 MMHG
AV LVOT PEAK GRADIENT: 3 MMHG
AV MEAN GRADIENT: 10 MMHG
AV PEAK GRADIENT: 14 MMHG
AV VALVE AREA BY VELOCITY RATIO: 1.3 CM²
AV VALVE AREA: 1.3 CM2
AV VELOCITY RATIO: 0.42
BASOPHILS # BLD AUTO: 0.08 K/UL (ref 0–0.2)
BASOPHILS NFR BLD: 0.2 % (ref 0–1.9)
BILIRUB SERPL-MCNC: 0.6 MG/DL (ref 0.1–1)
BSA FOR ECHO PROCEDURE: 2.27 M2
BUN SERPL-MCNC: 18 MG/DL (ref 8–23)
CA-I BLDV-SCNC: 1.06 MMOL/L (ref 1.06–1.42)
CALCIUM SERPL-MCNC: 7.9 MG/DL (ref 8.7–10.5)
CHLORIDE SERPL-SCNC: 106 MMOL/L (ref 95–110)
CO2 SERPL-SCNC: 21 MMOL/L (ref 23–29)
CREAT SERPL-MCNC: 0.8 MG/DL (ref 0.5–1.4)
CV ECHO LV RWT: 0.36 CM
DIFFERENTIAL METHOD BLD: ABNORMAL
DOP CALC AO PEAK VEL: 1.9 M/S
DOP CALC AO VTI: 32.5 CM
DOP CALC LVOT AREA: 3.1 CM2
DOP CALC LVOT DIAMETER: 2 CM
DOP CALC LVOT PEAK VEL: 0.8 M/S
DOP CALC LVOT STROKE VOLUME: 41.4 CM3
DOP CALCLVOT PEAK VEL VTI: 13.2 CM
E WAVE DECELERATION TIME: 335 MS
E/A RATIO: 0.82
E/E' RATIO: 6 M/S
ECHO EF ESTIMATED: 51 %
ECHO LV POSTERIOR WALL: 0.8 CM (ref 0.6–1.1)
EOSINOPHIL # BLD AUTO: 0 K/UL (ref 0–0.5)
EOSINOPHIL NFR BLD: 0 % (ref 0–8)
ERYTHROCYTE [DISTWIDTH] IN BLOOD BY AUTOMATED COUNT: 16.3 % (ref 11.5–14.5)
EST. GFR  (NO RACE VARIABLE): >60 ML/MIN/1.73 M^2
FRACTIONAL SHORTENING: 26.7 % (ref 28–44)
GLUCOSE SERPL-MCNC: 126 MG/DL (ref 70–110)
HCT VFR BLD AUTO: 32.4 % (ref 37–48.5)
HGB BLD-MCNC: 10.3 G/DL (ref 12–16)
IMM GRANULOCYTES # BLD AUTO: 0.2 K/UL (ref 0–0.04)
IMM GRANULOCYTES NFR BLD AUTO: 0.5 % (ref 0–0.5)
INTERVENTRICULAR SEPTUM: 1.4 CM (ref 0.6–1.1)
LA MAJOR: 5.5 CM
LA MINOR: 5.2 CM
LA WIDTH: 3.4 CM
LEFT ATRIUM SIZE: 3.7 CM
LEFT ATRIUM VOLUME INDEX: 26 ML/M2
LEFT ATRIUM VOLUME: 57 CM3
LEFT INTERNAL DIMENSION IN SYSTOLE: 3.3 CM (ref 2.1–4)
LEFT VENTRICLE DIASTOLIC VOLUME INDEX: 41.36 ML/M2
LEFT VENTRICLE DIASTOLIC VOLUME: 91 ML
LEFT VENTRICLE MASS INDEX: 79.6 G/M2
LEFT VENTRICLE SYSTOLIC VOLUME INDEX: 20 ML/M2
LEFT VENTRICLE SYSTOLIC VOLUME: 44 ML
LEFT VENTRICULAR INTERNAL DIMENSION IN DIASTOLE: 4.5 CM (ref 3.5–6)
LEFT VENTRICULAR MASS: 175 G
LV LATERAL E/E' RATIO: 6.4
LV SEPTAL E/E' RATIO: 6.4
LYMPHOCYTES # BLD AUTO: 27.2 K/UL (ref 1–4.8)
LYMPHOCYTES NFR BLD: 69.2 % (ref 18–48)
MAGNESIUM SERPL-MCNC: 1.8 MG/DL (ref 1.6–2.6)
MCH RBC QN AUTO: 29.7 PG (ref 27–31)
MCHC RBC AUTO-ENTMCNC: 31.8 G/DL (ref 32–36)
MCV RBC AUTO: 93 FL (ref 82–98)
MONOCYTES # BLD AUTO: 2.1 K/UL (ref 0.3–1)
MONOCYTES NFR BLD: 5.2 % (ref 4–15)
MV PEAK A VEL: 0.78 M/S
MV PEAK E VEL: 0.64 M/S
NEUTROPHILS # BLD AUTO: 9.8 K/UL (ref 1.8–7.7)
NEUTROPHILS NFR BLD: 24.9 % (ref 38–73)
NRBC BLD-RTO: 0 /100 WBC
OHS CV RV/LV RATIO: 0.64 CM
OHS LV EJECTION FRACTION SIMPSONS BIPLANE MOD: 55 %
PATH REV BLD -IMP: NORMAL
PHOSPHATE SERPL-MCNC: 3.5 MG/DL (ref 2.7–4.5)
PISA TR MAX VEL: 2.6 M/S
PLATELET # BLD AUTO: 175 K/UL (ref 150–450)
PLATELET BLD QL SMEAR: ABNORMAL
PMV BLD AUTO: 12.6 FL (ref 9.2–12.9)
POTASSIUM SERPL-SCNC: 4.7 MMOL/L (ref 3.5–5.1)
PROT SERPL-MCNC: 5 G/DL (ref 6–8.4)
RA MAJOR: 5.81 CM
RA PRESSURE ESTIMATED: 3 MMHG
RA WIDTH: 3 CM
RBC # BLD AUTO: 3.47 M/UL (ref 4–5.4)
RIGHT VENTRICLE DIASTOLIC BASEL DIMENSION: 2.9 CM
RV TB RVSP: 6 MMHG
SINUS: 3.2 CM
SODIUM SERPL-SCNC: 134 MMOL/L (ref 136–145)
STJ: 2.31 CM
TDI LATERAL: 0.1 M/S
TDI SEPTAL: 0.1 M/S
TDI: 0.1 M/S
TRICUSPID ANNULAR PLANE SYSTOLIC EXCURSION: 1.75 CM
TV PEAK GRADIENT: 28 MMHG
TV REST PULMONARY ARTERY PRESSURE: 30 MMHG
WBC # BLD AUTO: 39.27 K/UL (ref 3.9–12.7)
Z-SCORE OF LEFT VENTRICULAR DIMENSION IN END DIASTOLE: -5.12
Z-SCORE OF LEFT VENTRICULAR DIMENSION IN END SYSTOLE: -2.56

## 2025-03-12 PROCEDURE — 0KXG0ZZ TRANSFER LEFT TRUNK MUSCLE, OPEN APPROACH: ICD-10-PCS | Performed by: SURGERY

## 2025-03-12 PROCEDURE — 80053 COMPREHEN METABOLIC PANEL: CPT

## 2025-03-12 PROCEDURE — 85025 COMPLETE CBC W/AUTO DIFF WBC: CPT

## 2025-03-12 PROCEDURE — 97530 THERAPEUTIC ACTIVITIES: CPT

## 2025-03-12 PROCEDURE — 25000003 PHARM REV CODE 250: Performed by: STUDENT IN AN ORGANIZED HEALTH CARE EDUCATION/TRAINING PROGRAM

## 2025-03-12 PROCEDURE — 25000003 PHARM REV CODE 250

## 2025-03-12 PROCEDURE — 97161 PT EVAL LOW COMPLEX 20 MIN: CPT

## 2025-03-12 PROCEDURE — 84100 ASSAY OF PHOSPHORUS: CPT

## 2025-03-12 PROCEDURE — 0KXF0ZZ TRANSFER RIGHT TRUNK MUSCLE, OPEN APPROACH: ICD-10-PCS | Performed by: SURGERY

## 2025-03-12 PROCEDURE — 63600175 PHARM REV CODE 636 W HCPCS: Performed by: PSYCHIATRY & NEUROLOGY

## 2025-03-12 PROCEDURE — 63600175 PHARM REV CODE 636 W HCPCS: Performed by: NURSE PRACTITIONER

## 2025-03-12 PROCEDURE — 83735 ASSAY OF MAGNESIUM: CPT

## 2025-03-12 PROCEDURE — 27000221 HC OXYGEN, UP TO 24 HOURS

## 2025-03-12 PROCEDURE — 63600175 PHARM REV CODE 636 W HCPCS: Performed by: STUDENT IN AN ORGANIZED HEALTH CARE EDUCATION/TRAINING PROGRAM

## 2025-03-12 PROCEDURE — 99900035 HC TECH TIME PER 15 MIN (STAT)

## 2025-03-12 PROCEDURE — 99233 SBSQ HOSP IP/OBS HIGH 50: CPT | Mod: FS,,, | Performed by: PSYCHIATRY & NEUROLOGY

## 2025-03-12 PROCEDURE — 99499 UNLISTED E&M SERVICE: CPT | Mod: ,,, | Performed by: NURSE PRACTITIONER

## 2025-03-12 PROCEDURE — 82330 ASSAY OF CALCIUM: CPT

## 2025-03-12 PROCEDURE — 97166 OT EVAL MOD COMPLEX 45 MIN: CPT

## 2025-03-12 PROCEDURE — 94761 N-INVAS EAR/PLS OXIMETRY MLT: CPT

## 2025-03-12 PROCEDURE — 11000001 HC ACUTE MED/SURG PRIVATE ROOM

## 2025-03-12 PROCEDURE — 25000003 PHARM REV CODE 250: Performed by: NURSE PRACTITIONER

## 2025-03-12 PROCEDURE — 97116 GAIT TRAINING THERAPY: CPT

## 2025-03-12 RX ORDER — DIAZEPAM 2 MG/1
2 TABLET ORAL EVERY 8 HOURS PRN
Status: DISCONTINUED | OUTPATIENT
Start: 2025-03-12 | End: 2025-03-13

## 2025-03-12 RX ORDER — DIPHENHYDRAMINE HYDROCHLORIDE 50 MG/ML
25 INJECTION, SOLUTION INTRAMUSCULAR; INTRAVENOUS EVERY 6 HOURS PRN
Status: DISCONTINUED | OUTPATIENT
Start: 2025-03-12 | End: 2025-03-17 | Stop reason: HOSPADM

## 2025-03-12 RX ORDER — OXYCODONE HYDROCHLORIDE 10 MG/1
10 TABLET ORAL EVERY 4 HOURS PRN
Refills: 0 | Status: DISCONTINUED | OUTPATIENT
Start: 2025-03-12 | End: 2025-03-13

## 2025-03-12 RX ORDER — METHOCARBAMOL 750 MG/1
750 TABLET, FILM COATED ORAL 3 TIMES DAILY PRN
Status: DISCONTINUED | OUTPATIENT
Start: 2025-03-12 | End: 2025-03-14

## 2025-03-12 RX ORDER — AMOXICILLIN 250 MG
2 CAPSULE ORAL 2 TIMES DAILY
Status: DISCONTINUED | OUTPATIENT
Start: 2025-03-12 | End: 2025-03-13

## 2025-03-12 RX ORDER — CEFAZOLIN 2 G/1
2 INJECTION, POWDER, FOR SOLUTION INTRAMUSCULAR; INTRAVENOUS
Status: DISCONTINUED | OUTPATIENT
Start: 2025-03-12 | End: 2025-03-13

## 2025-03-12 RX ADMIN — ENOXAPARIN SODIUM 40 MG: 40 INJECTION SUBCUTANEOUS at 02:03

## 2025-03-12 RX ADMIN — LACTULOSE 20 G: 20 SOLUTION ORAL at 11:03

## 2025-03-12 RX ADMIN — OXYCODONE HYDROCHLORIDE 10 MG: 10 TABLET ORAL at 08:03

## 2025-03-12 RX ADMIN — DIPHENHYDRAMINE HYDROCHLORIDE 25 MG: 50 INJECTION, SOLUTION INTRAMUSCULAR; INTRAVENOUS at 05:03

## 2025-03-12 RX ADMIN — HYDROMORPHONE HYDROCHLORIDE 1 MG: 1 INJECTION, SOLUTION INTRAMUSCULAR; INTRAVENOUS; SUBCUTANEOUS at 05:03

## 2025-03-12 RX ADMIN — SENNOSIDES AND DOCUSATE SODIUM 2 TABLET: 50; 8.6 TABLET ORAL at 09:03

## 2025-03-12 RX ADMIN — HYDROMORPHONE HYDROCHLORIDE 1 MG: 1 INJECTION, SOLUTION INTRAMUSCULAR; INTRAVENOUS; SUBCUTANEOUS at 08:03

## 2025-03-12 RX ADMIN — DIPHENHYDRAMINE HYDROCHLORIDE 25 MG: 50 INJECTION, SOLUTION INTRAMUSCULAR; INTRAVENOUS at 04:03

## 2025-03-12 RX ADMIN — BACLOFEN 10 MG: 10 TABLET ORAL at 01:03

## 2025-03-12 RX ADMIN — OXYCODONE 10 MG: 5 TABLET ORAL at 05:03

## 2025-03-12 RX ADMIN — BACLOFEN 10 MG: 10 TABLET ORAL at 08:03

## 2025-03-12 RX ADMIN — OXYCODONE 10 MG: 5 TABLET ORAL at 01:03

## 2025-03-12 RX ADMIN — METHOCARBAMOL 750 MG: 750 TABLET ORAL at 08:03

## 2025-03-12 RX ADMIN — PANTOPRAZOLE SODIUM 20 MG: 20 TABLET, DELAYED RELEASE ORAL at 08:03

## 2025-03-12 RX ADMIN — Medication 250 MG: at 08:03

## 2025-03-12 RX ADMIN — HYDROMORPHONE HYDROCHLORIDE 1 MG: 1 INJECTION, SOLUTION INTRAMUSCULAR; INTRAVENOUS; SUBCUTANEOUS at 12:03

## 2025-03-12 RX ADMIN — OXYCODONE 15 MG: 5 TABLET ORAL at 09:03

## 2025-03-12 RX ADMIN — DIPHENHYDRAMINE HYDROCHLORIDE 25 MG: 50 INJECTION, SOLUTION INTRAMUSCULAR; INTRAVENOUS at 08:03

## 2025-03-12 RX ADMIN — HYDROMORPHONE HYDROCHLORIDE 1 MG: 1 INJECTION, SOLUTION INTRAMUSCULAR; INTRAVENOUS; SUBCUTANEOUS at 04:03

## 2025-03-12 RX ADMIN — HYDROMORPHONE HYDROCHLORIDE 1 MG: 1 INJECTION, SOLUTION INTRAMUSCULAR; INTRAVENOUS; SUBCUTANEOUS at 11:03

## 2025-03-12 RX ADMIN — ATORVASTATIN CALCIUM 20 MG: 20 TABLET, FILM COATED ORAL at 08:03

## 2025-03-12 RX ADMIN — CEFAZOLIN 1 G: 330 INJECTION, POWDER, FOR SOLUTION INTRAMUSCULAR; INTRAVENOUS at 04:03

## 2025-03-12 RX ADMIN — POLYETHYLENE GLYCOL 3350 17 G: 17 POWDER, FOR SOLUTION ORAL at 08:03

## 2025-03-12 RX ADMIN — Medication 400 MG: at 08:03

## 2025-03-12 RX ADMIN — OXYCODONE 15 MG: 5 TABLET ORAL at 01:03

## 2025-03-12 RX ADMIN — Medication 1 CAPSULE: at 12:03

## 2025-03-12 RX ADMIN — CEFAZOLIN 2 G: 2 INJECTION, POWDER, FOR SOLUTION INTRAMUSCULAR; INTRAVENOUS at 08:03

## 2025-03-12 RX ADMIN — ACETAMINOPHEN 650 MG: 325 TABLET ORAL at 01:03

## 2025-03-12 RX ADMIN — HYDROMORPHONE HYDROCHLORIDE 1 MG: 1 INJECTION, SOLUTION INTRAMUSCULAR; INTRAVENOUS; SUBCUTANEOUS at 02:03

## 2025-03-12 RX ADMIN — CEFAZOLIN 2 G: 2 INJECTION, POWDER, FOR SOLUTION INTRAMUSCULAR; INTRAVENOUS at 12:03

## 2025-03-12 RX ADMIN — SENNOSIDES AND DOCUSATE SODIUM 2 TABLET: 50; 8.6 TABLET ORAL at 10:03

## 2025-03-12 RX ADMIN — PREGABALIN 150 MG: 150 CAPSULE ORAL at 08:03

## 2025-03-12 NOTE — ASSESSMENT & PLAN NOTE
Recent Labs     03/11/25  0558 03/11/25  1405 03/11/25  1457   HGB 12.1 10.2* 11.3*       Estimated surgical blood loss 1.5L. Received 3 units pRBCs and 2 units platelets 3/11/25.     Plan:  --q8hr CBC  --coags wnl  --transfuse as needed  --monitor surgical drain output

## 2025-03-12 NOTE — SUBJECTIVE & OBJECTIVE
Interval History:   3/12: POD 1.  BP controlled off of fluids and pressors. Drains with 230/145 from deep drains. Neurologically intact. Having significant low back pain. Labs appropriate            Medications:  Continuous Infusions:   NORepinephrine bitartrate-NaCl  0-0.2 mcg/kg/min Intravenous Continuous   Stopped at 03/11/25 1500    PHENYLephrine HCl in 0.9% NaCl  0-3 mcg/kg/min Intravenous Continuous   Stopped at 03/11/25 1707     Scheduled Meds:   ascorbic acid (vitamin C)  250 mg Oral Daily    atorvastatin  20 mg Oral Daily    baclofen  10 mg Oral TID    ceFAZolin (Ancef) IV (PEDS and ADULTS)  1 g Intravenous Q8H    enoxparin  40 mg Subcutaneous Q24H (prophylaxis, 1700)    magnesium oxide  400 mg Oral QHS    pantoprazole  20 mg Oral Daily    polyethylene glycol  17 g Oral Daily    pregabalin  150 mg Oral BID    senna-docusate 8.6-50 mg  2 tablet Oral QHS     PRN Meds:  Current Facility-Administered Medications:     0.9%  NaCl infusion (for blood administration), , Intravenous, Q24H PRN    acetaminophen, 650 mg, Oral, Q6H PRN    diazePAM, 5 mg, Oral, Q8H PRN    diphenhydrAMINE, 25 mg, Intravenous, Q4H PRN    furosemide, 40 mg, Oral, Daily PRN    HYDROmorphone, 1 mg, Intravenous, Q3H PRN    labetalol, 10 mg, Intravenous, Q10 Min PRN    magnesium oxide, 800 mg, Oral, PRN    magnesium oxide, 800 mg, Oral, PRN    methocarbamoL, 750 mg, Oral, TID PRN    nicotine, 1 patch, Transdermal, Daily PRN    oxyCODONE, 10 mg, Oral, Q4H PRN    oxyCODONE, 15 mg, Oral, Q4H PRN    potassium bicarbonate, 35 mEq, Oral, PRN    potassium bicarbonate, 50 mEq, Oral, PRN    potassium bicarbonate, 60 mEq, Oral, PRN    potassium, sodium phosphates, 2 packet, Oral, PRN    potassium, sodium phosphates, 2 packet, Oral, PRN    potassium, sodium phosphates, 2 packet, Oral, PRN     Review of Systems  Objective:     Weight: 107.5 kg (237 lb)  Body mass index is 36.04 kg/m².  Vital Signs (Most Recent):  Temp: 98 °F (36.7 °C) (03/12/25  0301)  Pulse: 75 (03/12/25 0601)  Resp: 18 (03/12/25 0601)  BP: 113/71 (03/12/25 0601)  SpO2: 100 % (03/12/25 0601) Vital Signs (24h Range):  Temp:  [97.5 °F (36.4 °C)-98 °F (36.7 °C)] 98 °F (36.7 °C)  Pulse:  [68-90] 75  Resp:  [9-37] 18  SpO2:  [92 %-100 %] 100 %  BP: ()/(32-76) 113/71  Arterial Line BP: ()/(21-85) 132/77                              Closed/Suction Drain 03/11/25 1230 Tube - 1 Right;Inferior Back Bulb 15 Fr. (Active)   Site Description Unable to view 03/12/25 0501   Dressing Type Transparent (Tegaderm) 03/12/25 0501   Dressing Status Clean;Dry;Intact 03/12/25 0501   Dressing Intervention Integrity maintained 03/12/25 0501   Drainage Sanguineous 03/12/25 0501   Status Other (Comment) 03/12/25 0501   Output (mL) 70 mL 03/12/25 0545            Closed/Suction Drain 03/11/25 1230 Tube - 2 Left;Inferior Back Bulb 15 Fr. (Active)   Site Description Unable to view 03/12/25 0501   Dressing Type Transparent (Tegaderm) 03/12/25 0501   Dressing Status Clean;Dry;Intact 03/12/25 0501   Dressing Intervention Integrity maintained 03/12/25 0501   Drainage Sanguineous 03/12/25 0501   Status Other (Comment) 03/12/25 0501   Output (mL) 65 mL 03/12/25 0545            Closed/Suction Drain 03/11/25 1307 Tube - 3 Right Back Accordion 10 Fr. (Active)   Site Description Unable to view 03/12/25 0501   Dressing Type Transparent (Tegaderm) 03/12/25 0501   Dressing Status Clean;Dry;Intact 03/12/25 0501   Dressing Intervention Integrity maintained 03/12/25 0501   Drainage Sanguineous 03/12/25 0501   Status Other (Comment) 03/12/25 0501   Output (mL) 15 mL 03/12/25 0545            Closed/Suction Drain 03/11/25 1307 Tube - 2 Left Back Accordion 10 Fr. (Active)   Site Description Unable to view 03/12/25 0501   Dressing Type Transparent (Tegaderm) 03/12/25 0501   Dressing Status Clean;Dry;Intact 03/12/25 0501   Dressing Intervention Integrity maintained 03/12/25 0501   Drainage Sanguineous 03/12/25 0501   Status Other  "(Comment) 03/12/25 0501   Output (mL) 200 mL 03/12/25 0545            Urethral Catheter 03/11/25 0730 Non-latex;Straight-tip 16 Fr. (Active)   Site Assessment Clean;Intact 03/12/25 0501   Collection Container Urimeter 03/12/25 0501   Securement Method secured to top of thigh w/ adhesive device 03/12/25 0501   Catheter Care Performed yes 03/12/25 0501   Reason for Continuing Urinary Catheterization Post operative 03/12/25 0501   CAUTI Prevention Bundle Securement Device in place with 1" slack;Intact seal between catheter & drainage tubing;Drainage bag/urimeter off the floor;Sheeting clip in use;No dependent loops or kinks;Drainage bag/urimeter not overfilled (<2/3 full);Drainage bag/urimeter below bladder 03/12/25 0301   Output (mL) 40 mL 03/12/25 0601          Physical Exam         Neurosurgery Physical Exam    5/5 strength BLE  Sensation present to BLE  Incision covered with prevena  Drains holding suction    Gen: well nourished, normocephalic, atraumatic  CV: skin warm and dry, distal pulses present  Pulm: chest rise symmetric, no increased work of breathing  GI: abdomen soft, non-distended, non-tender  : patient voiding independently  MSK: no bony abnormalities noted  Psych: patient interacting with appropriate mood and affect                         Significant Labs:  Recent Labs   Lab 03/11/25  1405 03/12/25  0406   * 126*    134*   K 4.8 4.7   * 106   CO2 19* 21*   BUN 20 18   CREATININE 0.8 0.8   CALCIUM 7.4* 7.9*   MG  --  1.8     Recent Labs   Lab 03/11/25  1405 03/11/25  1457 03/12/25  0018   WBC 58.77* 62.71* 39.27*   HGB 10.2* 11.3* 10.3*   HCT 33.7* 36.2* 32.4*    256 175     Recent Labs   Lab 03/11/25  1405   INR 1.1   APTT 25.6     Microbiology Results (last 7 days)       ** No results found for the last 168 hours. **          All pertinent labs from the last 24 hours have been reviewed.    Significant Diagnostics:  I have reviewed all pertinent imaging results/findings " within the past 24 hours.

## 2025-03-12 NOTE — PROGRESS NOTES
[facility]  Neurosurgery  Progress Note    Subjective:     History of Present Illness: HPI from 10/24/2024:  Patient presents for follow-up of low back pain. S/p L4-5 MIS laminectomy with Dr. Walters 10/23/23. Previous decompression at L5-S1 at OSH. Progressively worsening since 2020. She has tried L5-S1 facet injections, PT, and massage therapy without relief. The pain is constant. Occasional neck stiffness. Denies arm pain, leg pain, b/b dysfunction, hand clumsiness. She walks flexed forward as she is unable to stand upright without pain. She reports intermittent foot numbness following surgeries in both feet in the early 2000s. She underwent cardiac stent placement 3/2024 and is on plavix now. She states her cardiologist won't clear her until 1 year post-op to undergo spine fusion. She also uses nicotine but says she will quit in order to have surgery. [facility]      Interval Hx:   Today the patient presents to clinic to discuss surgery. Denies changes in symptoms since last visit. Continues to report debilitating back pain. She cannot walk long distances or stand for long periods of time without severe pain. She's been participating in pre-hab since the last visit. She denies any bowel/bladder issues.        Post-Op Info:  Procedure(s) (LRB):  *AIRO* T10-PELVIS POSTERIOR INSTRUMENTED FUSION (N/A)  CLOSURE, SURGICAL WOUND OR DEHISCENCE, EXTENSIVE OR COMPLEX, SECONDARY   1 Day Post-Op   Interval History:   3/12: POD 1.  BP controlled off of fluids and pressors. Drains with 230/145 from deep drains. Neurologically intact. Having significant low back pain. Labs appropriate            Medications:  Continuous Infusions:   NORepinephrine bitartrate-NaCl  0-0.2 mcg/kg/min Intravenous Continuous   Stopped at 03/11/25 1500    PHENYLephrine HCl in 0.9% NaCl  0-3 mcg/kg/min Intravenous Continuous   Stopped at 03/11/25 1707     Scheduled Meds:   ascorbic acid  (vitamin C)  250 mg Oral Daily    atorvastatin  20 mg Oral Daily    baclofen  10 mg Oral TID    ceFAZolin (Ancef) IV (PEDS and ADULTS)  1 g Intravenous Q8H    enoxparin  40 mg Subcutaneous Q24H (prophylaxis, 1700)    magnesium oxide  400 mg Oral QHS    pantoprazole  20 mg Oral Daily    polyethylene glycol  17 g Oral Daily    pregabalin  150 mg Oral BID    senna-docusate 8.6-50 mg  2 tablet Oral QHS     PRN Meds:  Current Facility-Administered Medications:     0.9%  NaCl infusion (for blood administration), , Intravenous, Q24H PRN    acetaminophen, 650 mg, Oral, Q6H PRN    diazePAM, 5 mg, Oral, Q8H PRN    diphenhydrAMINE, 25 mg, Intravenous, Q4H PRN    furosemide, 40 mg, Oral, Daily PRN    HYDROmorphone, 1 mg, Intravenous, Q3H PRN    labetalol, 10 mg, Intravenous, Q10 Min PRN    magnesium oxide, 800 mg, Oral, PRN    magnesium oxide, 800 mg, Oral, PRN    methocarbamoL, 750 mg, Oral, TID PRN    nicotine, 1 patch, Transdermal, Daily PRN    oxyCODONE, 10 mg, Oral, Q4H PRN    oxyCODONE, 15 mg, Oral, Q4H PRN    potassium bicarbonate, 35 mEq, Oral, PRN    potassium bicarbonate, 50 mEq, Oral, PRN    potassium bicarbonate, 60 mEq, Oral, PRN    potassium, sodium phosphates, 2 packet, Oral, PRN    potassium, sodium phosphates, 2 packet, Oral, PRN    potassium, sodium phosphates, 2 packet, Oral, PRN     Review of Systems  Objective:     Weight: 107.5 kg (237 lb)  Body mass index is 36.04 kg/m².  Vital Signs (Most Recent):  Temp: 98 °F (36.7 °C) (03/12/25 0301)  Pulse: 75 (03/12/25 0601)  Resp: 18 (03/12/25 0601)  BP: 113/71 (03/12/25 0601)  SpO2: 100 % (03/12/25 0601) Vital Signs (24h Range):  Temp:  [97.5 °F (36.4 °C)-98 °F (36.7 °C)] 98 °F (36.7 °C)  Pulse:  [68-90] 75  Resp:  [9-37] 18  SpO2:  [92 %-100 %] 100 %  BP: ()/(32-76) 113/71  Arterial Line BP: ()/(21-85) 132/77                              Closed/Suction Drain 03/11/25 1230 Tube - 1 Right;Inferior Back Bulb 15 Fr. (Active)   Site Description Unable to  view 03/12/25 0501   Dressing Type Transparent (Tegaderm) 03/12/25 0501   Dressing Status Clean;Dry;Intact 03/12/25 0501   Dressing Intervention Integrity maintained 03/12/25 0501   Drainage Sanguineous 03/12/25 0501   Status Other (Comment) 03/12/25 0501   Output (mL) 70 mL 03/12/25 0545            Closed/Suction Drain 03/11/25 1230 Tube - 2 Left;Inferior Back Bulb 15 Fr. (Active)   Site Description Unable to view 03/12/25 0501   Dressing Type Transparent (Tegaderm) 03/12/25 0501   Dressing Status Clean;Dry;Intact 03/12/25 0501   Dressing Intervention Integrity maintained 03/12/25 0501   Drainage Sanguineous 03/12/25 0501   Status Other (Comment) 03/12/25 0501   Output (mL) 65 mL 03/12/25 0545            Closed/Suction Drain 03/11/25 1307 Tube - 3 Right Back Accordion 10 Fr. (Active)   Site Description Unable to view 03/12/25 0501   Dressing Type Transparent (Tegaderm) 03/12/25 0501   Dressing Status Clean;Dry;Intact 03/12/25 0501   Dressing Intervention Integrity maintained 03/12/25 0501   Drainage Sanguineous 03/12/25 0501   Status Other (Comment) 03/12/25 0501   Output (mL) 15 mL 03/12/25 0545            Closed/Suction Drain 03/11/25 1307 Tube - 2 Left Back Accordion 10 Fr. (Active)   Site Description Unable to view 03/12/25 0501   Dressing Type Transparent (Tegaderm) 03/12/25 0501   Dressing Status Clean;Dry;Intact 03/12/25 0501   Dressing Intervention Integrity maintained 03/12/25 0501   Drainage Sanguineous 03/12/25 0501   Status Other (Comment) 03/12/25 0501   Output (mL) 200 mL 03/12/25 0545            Urethral Catheter 03/11/25 0730 Non-latex;Straight-tip 16 Fr. (Active)   Site Assessment Clean;Intact 03/12/25 0501   Collection Container Urimeter 03/12/25 0501   Securement Method secured to top of thigh w/ adhesive device 03/12/25 0501   Catheter Care Performed yes 03/12/25 0501   Reason for Continuing Urinary Catheterization Post operative 03/12/25 0501   CAUTI Prevention Bundle Securement Device in place  "with 1" slack;Intact seal between catheter & drainage tubing;Drainage bag/urimeter off the floor;Sheeting clip in use;No dependent loops or kinks;Drainage bag/urimeter not overfilled (<2/3 full);Drainage bag/urimeter below bladder 03/12/25 0301   Output (mL) 40 mL 03/12/25 0601          Physical Exam         Neurosurgery Physical Exam    5/5 strength BLE  Sensation present to BLE  Incision covered with prevena  Drains holding suction    Gen: well nourished, normocephalic, atraumatic  CV: skin warm and dry, distal pulses present  Pulm: chest rise symmetric, no increased work of breathing  GI: abdomen soft, non-distended, non-tender  : patient voiding independently  MSK: no bony abnormalities noted  Psych: patient interacting with appropriate mood and affect                         Significant Labs:  Recent Labs   Lab 03/11/25  1405 03/12/25  0406   * 126*    134*   K 4.8 4.7   * 106   CO2 19* 21*   BUN 20 18   CREATININE 0.8 0.8   CALCIUM 7.4* 7.9*   MG  --  1.8     Recent Labs   Lab 03/11/25  1405 03/11/25  1457 03/12/25  0018   WBC 58.77* 62.71* 39.27*   HGB 10.2* 11.3* 10.3*   HCT 33.7* 36.2* 32.4*    256 175     Recent Labs   Lab 03/11/25  1405   INR 1.1   APTT 25.6     Microbiology Results (last 7 days)       ** No results found for the last 168 hours. **          All pertinent labs from the last 24 hours have been reviewed.    Significant Diagnostics:  I have reviewed all pertinent imaging results/findings within the past 24 hours.  Assessment/Plan:     * Spinal stenosis of lumbar region without neurogenic claudication  Fior CarrDeandremelgoza has flat back deformity and lumbar stenosis with neurogenic claudication. She is now s/p T11-pelvis fusion (3/11).     POD 1.     Admitted to neurosurgery, ICU status  Q1 hour neurochecks while in ICU  Once MAP/Pressure controlled reliably may step down to neurosurgery floor status, appreciate ICU input  Continue HV drains and ANDREINA " drains/Prevena per plastic surgery  PT/OT/OOB as tolerated  XR TL Spine   Discontinue narvaez, discontinue A line when appropriate per NCC  MM pain control: increased pain meds  DVT ppx: LVX    D/w staff, Dr. Saravanan Collins MD  Neurosurgery  [facility] - Neuro Critical Care

## 2025-03-12 NOTE — PLAN OF CARE
Problem: Occupational Therapy  Goal: Occupational Therapy Goal  Description: Goals to be met by:4/12/25     Patient will increase functional independence with ADLs by performing:    UE Dressing of TLSO with Supervision.  LE Dressing with Supervision with AD as needed.  Grooming while standing at sink with Supervision.  Toileting from toilet with Supervision for hygiene and clothing management.   Sitting at edge of bed x5 minutes with Supervision.  Rolling to Bilateral with Supervision.   Supine to sit with Supervision.  Step transfer with Supervision  Toilet transfer to toilet with Supervision.    Outcome: Progressing     Patient tolerated OT eval. Goals and POC established. See note for further details.;

## 2025-03-12 NOTE — ASSESSMENT & PLAN NOTE
Fior CarrDeandreabad has flat back deformity and lumbar stenosis with neurogenic claudication. She is now s/p T11-pelvis fusion (3/11).     POD 1.     Admitted to neurosurgery, ICU status  Q1 hour neurochecks while in ICU  Once MAP/Pressure controlled reliably may step down to neurosurgery floor status, appreciate ICU input  Continue HV drains and ANDREINA drains/Prevena per plastic surgery  PT/OT/OOB as tolerated  XR TL Spine   Discontinue narvaez, discontinue A line when appropriate per NCC  MM pain control: increased pain meds  DVT ppx: LVX    D/w staff, Dr. Coe

## 2025-03-12 NOTE — PROGRESS NOTES
"Plastic and Reconstructive Surgery   Progress Note    Subjective:    Pain this morning. Discussed surgical findings.    Objective:  Vital signs in last 24 hours:  Temp:  [97.5 °F (36.4 °C)-99.2 °F (37.3 °C)] 99.2 °F (37.3 °C)  Pulse:  [68-90] 79  Resp:  [9-37] 13  SpO2:  [92 %-100 %] 96 %  BP: ()/(32-76) 101/57  Arterial Line BP: ()/(21-85) 132/77    Intake/Output last 3 shifts:  I/O last 3 completed shifts:  In: 8165.3 [P.O.:950; I.V.:1254.1; Blood:1445; IV Piggyback:4516.2]  Out: 4765 [Urine:2350; Drains:915; Blood:1500]    Intake/Output this shift:  No intake/output data recorded.        Physical Exam:  VITAL SIGNS:   Vitals:    25 0601 25 0700 25 0754 25 0800   BP: 113/71 114/65  (!) 101/57   BP Location:    Right arm   Patient Position:    Lying   Pulse: 75 86 79 79   Resp: 18 (!) 23 19 13   Temp:    99.2 °F (37.3 °C)   TempSrc:    Oral   SpO2: 100% 95% 98% 96%   Weight:    107.5 kg (237 lb)   Height:    5' 8" (1.727 m)     TMAX: Temp (24hrs), Av.9 °F (36.6 °C), Min:97.5 °F (36.4 °C), Max:99.2 °F (37.3 °C)      General: Alert; No acute distress  Cardiovascular: Regular rate   Respiratory: Normal respiratory effort. Chest rise symmetric.   Abdomen: Soft, nontender, nondistended  Extremity: Moves all extremities equally.  Neurologic: No focal deficit. Speech normal  Midline incision w/ prevena and good seal  Drains ANDREINA x2 inferior back    Drains accordian are NSGY      Scheduled Medications ascorbic acid (vitamin C), 250 mg, Daily  atorvastatin, 20 mg, Daily  baclofen, 10 mg, TID  ceFAZolin (Ancef) IV (PEDS and ADULTS), 2 g, Q8H  enoxparin, 40 mg, Q24H (prophylaxis, 1700)  magnesium oxide, 400 mg, QHS  pantoprazole, 20 mg, Daily  polyethylene glycol, 17 g, Daily  pregabalin, 150 mg, BID  senna-docusate 8.6-50 mg, 2 tablet, QHS        PRN Medications     Current Facility-Administered Medications:     acetaminophen, 650 mg, Oral, Q6H PRN    diazePAM, 5 mg, Oral, Q8H PRN    " diphenhydrAMINE, 25 mg, Intravenous, Q4H PRN    furosemide, 40 mg, Oral, Daily PRN    HYDROmorphone, 1 mg, Intravenous, Q3H PRN    labetalol, 10 mg, Intravenous, Q10 Min PRN    magnesium oxide, 800 mg, Oral, PRN    magnesium oxide, 800 mg, Oral, PRN    methocarbamoL, 750 mg, Oral, TID PRN    nicotine, 1 patch, Transdermal, Daily PRN    oxyCODONE, 10 mg, Oral, Q4H PRN    oxyCODONE, 15 mg, Oral, Q4H PRN    potassium bicarbonate, 35 mEq, Oral, PRN    potassium bicarbonate, 50 mEq, Oral, PRN    potassium bicarbonate, 60 mEq, Oral, PRN    potassium, sodium phosphates, 2 packet, Oral, PRN    potassium, sodium phosphates, 2 packet, Oral, PRN    potassium, sodium phosphates, 2 packet, Oral, PRN    Recent Labs:   Lab Results   Component Value Date    WBC 39.27 (H) 03/12/2025    HGB 10.3 (L) 03/12/2025    HCT 32.4 (L) 03/12/2025    MCV 93 03/12/2025     03/12/2025     Lab Results   Component Value Date     (H) 03/12/2025     (L) 03/12/2025    K 4.7 03/12/2025     03/12/2025    BUN 18 03/12/2025         Assessment:   64 y.o. y/o female s/p Procedure(s):  *AIRO* T10-PELVIS POSTERIOR INSTRUMENTED FUSION  CLOSURE, SURGICAL WOUND OR DEHISCENCE, EXTENSIVE OR COMPLEX, SECONDARY      1 Day Post-Op         Plan  - keep prevena on for 5-7 days  - Drains: keep PRS ANDREINA drains x2 in place. Accordians per NSGY  - [place] will peripherally follow and remove drains and vac when appropraite.      Patient was discussed with Attending Plastic Surgeon, Dr. Denton Iyer MD- Fellow  Department of Plastic and Reconstructive Surgery

## 2025-03-12 NOTE — ASSESSMENT & PLAN NOTE
Reported history during COVID. Stopped cold turkey in 3553-7440 with resultant seizure. Reports only drinks only occasionally now.    Plan:  -- monitor for signs/symptoms of alcohol withdrawal  -- CIWA protocol and phenobarbitol +/- diazepam if concerned for acute alcohol withdrawal

## 2025-03-12 NOTE — ASSESSMENT & PLAN NOTE
Intraoperatively she was noted to have 1.5L blood loss and received 4L crystalloid, 2 units pRBCs, and 2 units platelets. On arrival to [facility] she was profoundly hypotensive requiring vasopressors and an additional liter of IVF and 1 unit pRBCs. MAPs noted to be as low as 35. Unclear etiology, differentials include adverse reaction to anesthesia, hypovolemic shock, cardiogenic shock, hypocalcemia, neurogenic shock.     Patient's BP much improved following above therapy. Now off vasopressors. Mentating well, no acute neurologic symptoms. No signs of volume overload, no cyanosis. Warm peripherally.     Plan:  -- monitor BP closely  -- resume vasopressors if MAPs below 70  -- q8hr CBC  -- f/u ionized Ca

## 2025-03-12 NOTE — ASSESSMENT & PLAN NOTE
S/p elective T11-pelvis posterior fusion, L4-S1 TLIF and L1-L4 SPO for deformity correction and bilateral paraspinal muscle flap closure for with NSGY and plastic surgery 3/11/25. Intraoperatively she was noted to have 1.5L blood loss and received 4L crystalloid, 2 units pRBCs, and 2 units platelets. On arrival to [facility] she was profoundly hypotensive requiring vasopressors and an additional liter of IVF and 1 unit pRBCs.     Plan:  -- TLSO brace per NSGY  -- multimodal pain control  -- [id number] neurochecks  -- PT/OT consulted  -- monitor UOP  -- post-op coags wnl  -- regular diet  -- HV Drains to suction, prevena and ANDREINA drains per plastic surgery  -- XR TL Spine   3/12/2025 OSMANY, stepdown today to NSGY team, off pressors since yesterday afternoon, CBC daily

## 2025-03-12 NOTE — HOSPITAL COURSE
3/12: POD 1.  BP controlled off of fluids and pressors. Drains with 230/145 from deep drains. Neurologically intact. Having significant low back pain. Labs appropriate  3/13: POD 2. Significant low back pain limiting mobility.  Walked 30 feet with PT.  Will increase pain medication today and obtain XR TL Spine.  3/14: POD 3. XR obtained, deep drains with 60/160 output, superficial drain and prevena per PRS.  Walked 100 ft yesterday.  Voiding.  Pain better controlled.   3/15: POD 4. Neuro exam stable. Had 2 episodes of hypotension yesterday afternoon, Hgb 7.5, got 1U of blood, got CTA AP showed no active bleeding / hematoma, put patient on pulse ox and tele for monitoring. Repeat Hgb after transfusion was 8.5. Satting well on 2L NC due to dropping sat when sleep per nurse. BP has been stable. Drains in place with output: ANDREINA: 340cc (L), 450cc (R) and HV: 0cc (L), 10cc (R).   3/16: NAEON. H/H 8/26 this AM. Pending BM, c/o gas pain. HV and ANDREINA output downtrending. HM following, appreciate assistanc

## 2025-03-12 NOTE — SUBJECTIVE & OBJECTIVE
Review of Systems  Constitutional: Denies fevers, weight loss, chills, or weakness.  Eyes: Denies changes in vision.  ENT: Denies dysphagia, nasal discharge, ear pain or discharge.  Cardiovascular: Denies chest pain, palpitations, orthopnea, or claudication.  Respiratory: Denies shortness of breath, cough, hemoptysis, or wheezing.  GI: Denies nausea/vomitting, hematochezia, melena, abd pain, or changes in appetite.  : Denies dysuria, incontinence, or hematuria.  Musculoskeletal: Denies joint pain or myalgias.  Skin/breast: Denies rashes, lumps, lesions, or discharge.  Neurologic: Denies headache, dizziness, vertigo, or paresthesias.  Psychiatric: Denies changes in mood or hallucinations.  Endocrine: Denies polyuria, polydipsia, heat/cold intolerance.  Hematologic/Lymph: Denies lymphadenopathy, easy bruising or easy bleeding.  Allergic/Immunologic: Denies rash, rhinitis.   Objective:     Vitals:  Temp: 98.9 °F (37.2 °C)  Pulse: 87  Rhythm: normal sinus rhythm  BP: (!) 95/53  MAP (mmHg): 66  Resp: 19  SpO2: 99 %    Temp  Min: 97.5 °F (36.4 °C)  Max: 99.2 °F (37.3 °C)  Pulse  Min: 68  Max: 90  BP  Min: 92/61  Max: 131/67  MAP (mmHg)  Min: 66  Max: 92  Resp  Min: 9  Max: 31  SpO2  Min: 92 %  Max: 100 %    03/11 0701 - 03/12 0700  In: 8165.3 [P.O.:950; I.V.:1254.1]  Out: 4765 [Urine:2350; Drains:915]   Unmeasured Output  Stool Occurrence: 0  Emesis Occurrence: 0  Pad Count: 0        Physical Exam  Vitals reviewed.   HENT:      Head: Normocephalic.      Nose: Nose normal.      Mouth/Throat:      Mouth: Mucous membranes are moist.   Cardiovascular:      Rate and Rhythm: Normal rate.   Pulmonary:      Effort: Pulmonary effort is normal.   Abdominal:      Palpations: Abdomen is soft.   Skin:     General: Skin is warm and dry.   Psychiatric:         Mood and Affect: Mood normal.       Neuro:  --GCS: E4 V5 M6  --Mental Status:  awake, oriented X4, follows simple commands  --Pupils reactive  --SAVAGE spont          Medications:  Continuous Scheduledascorbic acid (vitamin C), 250 mg, Daily  atorvastatin, 20 mg, Daily  baclofen, 10 mg, TID  ceFAZolin (Ancef) IV (PEDS and ADULTS), 2 g, Q8H  enoxparin, 40 mg, Q24H (prophylaxis, 1700)  Lactobacillus rhamnosus GG, 1 capsule, Daily  lactulose, 20 g, Daily  magnesium oxide, 400 mg, QHS  pantoprazole, 20 mg, Daily  polyethylene glycol, 17 g, Daily  pregabalin, 150 mg, BID  senna-docusate 8.6-50 mg, 2 tablet, BID    PRNacetaminophen, 650 mg, Q6H PRN  diazePAM, 2 mg, Q8H PRN  diphenhydrAMINE, 25 mg, Q6H PRN  furosemide, 40 mg, Daily PRN  HYDROmorphone, 1 mg, Q3H PRN  labetalol, 10 mg, Q10 Min PRN  magnesium oxide, 800 mg, PRN  magnesium oxide, 800 mg, PRN  methocarbamoL, 750 mg, TID PRN  nicotine, 1 patch, Daily PRN  oxyCODONE, 15 mg, Q4H PRN  oxyCODONE, 10 mg, Q4H PRN  potassium bicarbonate, 35 mEq, PRN  potassium bicarbonate, 50 mEq, PRN  potassium bicarbonate, 60 mEq, PRN  potassium, sodium phosphates, 2 packet, PRN  potassium, sodium phosphates, 2 packet, PRN  potassium, sodium phosphates, 2 packet, PRN      Today I personally reviewed pertinent medications, lines/drains/airways, imaging, cardiology results, laboratory results, microbiology results,     Diet  Diet Adult Regular

## 2025-03-12 NOTE — ASSESSMENT & PLAN NOTE
S/p elective T11-pelvis posterior fusion, L4-S1 TLIF and L1-L4 SPO for deformity correction and bilateral paraspinal muscle flap closure for with NSGY and plastic surgery 3/11/25. Intraoperatively she was noted to have 1.5L blood loss and received 4L crystalloid, 2 units pRBCs, and 2 units platelets. On arrival to [facility] she was profoundly hypotensive requiring vasopressors and an additional liter of IVF and 1 unit pRBCs.     Plan:  -- TLSO brace per NSGY  -- multimodal pain control  -- [id number] neurochecks  -- PT/OT consulted  -- monitor UOP  -- post-op coags wnl  -- regular diet  -- HV Drains to suction, prevena and ANDREINA drains per plastic surgery  -- XR TL Spine tomorrow

## 2025-03-12 NOTE — ASSESSMENT & PLAN NOTE
Reported history of CAD s/p 2-3(?) stents in 2024 with resultant ICM and HFrEF. Reports EF improved from 30% to 60%. Not overtly fluid overloaded on exam.    Plan:  -- holding GDMT given post-operative hypotension  -- Echo ordered, follow-up  -- resume GDMT following cessation of vassopressors

## 2025-03-12 NOTE — PLAN OF CARE
"[facility] Care Plan    POC reviewed with Fior Mckeon at 0400. Patient verbalized understanding. Questions and concerns addressed. No acute events today. Pt progressing toward goals. Will continue to monitor. See below and flowsheets for full assessment and VS info.   - PRN oxy and dilaudid given multiple times overnight for pain; see MAR for admin details   - Pending postop xrays   - HVD x2 and ANDREINA x2 remain to full suction; see flowsheets for output overnight   -  Keeping SBP <180 per IVETT Mckeon verbal order             Is this a stroke patient? no    Neuro:  [place] Coma Scale  Best Eye Response: 4-->(E4) spontaneous  Best Motor Response: 6-->(M6) obeys commands  Best Verbal Response: 5-->(V5) oriented  [place] Coma Scale Score: 15  Assessment Qualifiers: patient not sedated/intubated  Pupil PERRLA: yes     24 hr Temp:  [97.5 °F (36.4 °C)-98 °F (36.7 °C)]     CV:   Rhythm: normal sinus rhythm  BP goals:   SBP < 180 per IVETT Mckeon verbal order   MAP > 65    Resp:           Plan: N/A    GI/:     Diet/Nutrition Received: regular  Last Bowel Movement: 03/11/25  Voiding Characteristics: urethral catheter (bladder)    Intake/Output Summary (Last 24 hours) at 3/12/2025 0320  Last data filed at 3/12/2025 0301  Gross per 24 hour   Intake 7930.77 ml   Output 4205 ml   Net 3725.77 ml          Labs/Accuchecks:  Recent Labs   Lab 03/12/25  0018   WBC 39.27*   RBC 3.47*   HGB 10.3*   HCT 32.4*         Recent Labs   Lab 03/11/25  1405      K 4.8   CO2 19*   *   BUN 20   CREATININE 0.8   ALKPHOS 85   ALT 31   AST 54*   BILITOT 0.6      Recent Labs   Lab 03/11/25  1405   INR 1.1   APTT 25.6    No results for input(s): "CPK", "CPKMB", "TROPONINI", "MB" in the last 168 hours.    Electrolytes: N/A - electrolytes WDL  Accuchecks: none    Gtts:   0.9% NaCl   Intravenous Continuous 75 mL/hr at 03/12/25 0301 Rate Verify at 03/12/25 0301    NORepinephrine bitartrate-NaCl  0-0.2 mcg/kg/min Intravenous Continuous   " Stopped at 03/11/25 1500    PHENYLephrine HCl in 0.9% NaCl  0-3 mcg/kg/min Intravenous Continuous   Stopped at 03/11/25 1707       LDA/Wounds:    Nik Risk Assessment  Sensory Perception: 4-->no impairment  Moisture: 4-->rarely moist  Activity: 1-->bedfast  Mobility: 3-->slightly limited  Nutrition: 3-->adequate  Friction and Shear: 2-->potential problem  Nik Score: 17  Is your nik score 12 or less? no          Restraints:        [facility]

## 2025-03-12 NOTE — HOSPITAL COURSE
3/12/2025: add more senna doc and Lactulose per patient request, doing well, hemodynamically stable, decrease diazepam, stepdown to NSGY team

## 2025-03-12 NOTE — PLAN OF CARE
Problem: Physical Therapy  Goal: Physical Therapy Goal  Description: PT goals until 3/22/25    1. Pt supine to sit through sidelying with mod independent-not met  2. Pt sit to supine through sidelying with mod independent-not met  3. Pt sit to stand with RW or pt's own lofstrand crutches as needed for safety with supervision-not met  4. Pt to perform gait 150ft with RW or pt's own lofstrand crutches  with supervision.-not met  5. Pt to transfer bed to/from bedside chair with RW or lofstrand crutches with supervision.-not met    Outcome: Progressing   Pt's goals set and pt will benefit from skilled PT services to work towards improved functional mobility including: bed mobility, transfers, and gait. Sandie Irizarry PT  3/12/2025

## 2025-03-12 NOTE — CLINICAL REVIEW
I have reviewed the chart of Fior Mckeon who is hospitalized for the following:    Active Hospital Problems    Diagnosis    *Spinal stenosis of lumbar region without neurogenic claudication    Osteoarthritis    Tobacco dependency    Acute blood loss anemia     Monitor cbc daily  Transfuse if hgb less than 7      HFrEF (heart failure with reduced ejection fraction)    History of alcohol abuse    Postoperative hypotension    CLL (chronic lymphocytic leukemia)     Monitor labs daily          DDD (degenerative disc disease), lumbar        FAM Ceron-DIMITRIOS  Unit Based EMELI
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
## 2025-03-12 NOTE — PT/OT/SLP EVAL
"Physical Therapy Evaluation/co eval with OT    Patient Name:  Fior Mckeon   MRN:  [id number]    Recommendations:     Discharge Recommendations: Low Intensity Therapy   Discharge Equipment Recommendations: bath bench, hip kit, walker, rolling   Barriers to discharge: None    Assessment:     Fior Mckeon is a 64 y.o. female admitted with a medical diagnosis of Spinal stenosis of lumbar region without neurogenic claudication.  She presents with the following impairments/functional limitations: impaired endurance, impaired functional mobility, gait instability, impaired self care skills, orthopedic precautions, pain .Pt is unsafe with functional mobility at this time due to pt requires moderate assist for bed mobility, CGA from edge of bed for transfers, and CGA for gait due to pain and mild instability. Pt is motivated to progress with functional mobility.     Rehab Prognosis: Good; patient would benefit from acute skilled PT services to address these deficits and reach maximum level of function.    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  Equipment used at home: crutches, forearm.  Upon discharge, patient will have assistance from family.    Objective:     Communicated with nurse prior to session.  Patient found HOB elevated with blood pressure cuff, ANDREINA drain, hemovac, wound vac, pulse ox (continuous), telemetry (ANDREINA drain x 2 and hemovac x 2)  upon PT entry to room.    General Precautions: Standard, fall  Orthopedic Precautions:spinal precautions   Braces: TLSO  Respiratory Status: Room air    Exams:  Cognitive Exam:  Patient is oriented to Person, Place, and Time  Sensation:    -       Intact  light/touch B LE except impaired below the ankle on R LE; pt inconsistent with responses  RLE ROM: WFL  RLE Strength: WFL except hip flex 4-/5  LLE ROM: WFL  LLE Strength: WFL except hip flex 4-/5    Functional Mobility:  Bed Mobility:     Rolling Right: minimum assistance  Supine to Sit: moderate assistance  Transfers:     Sit to Stand:  contact guard assistance with rolling walker  Gait: 10ft then 30ft with RW with CGA. Pt performed gait with decreased step length, decreased clearance of B feet during swing phase, and at slow pace.     AM-PAC 6 CLICK MOBILITY  Total Score:16     Treatment & Education:  Pt sat on the EOB ~ 6 min with CGA allowing for PT and OT to assist her with donning her TLSO prior to transfer. Pt educated in proper technique with donning back brace and in the spinal prec with mobility, she expressed understanding.  Additional staff present: OT; OT for co-evaluation due to suspected patient need for two skilled therapists to appropriately assess patient's functional deficits as well as ensure patient safety, accommodate for limited activity tolerance, and provide appropriate, skilled assistance to maximize functional potential during evaluation.     Patient left up in chair with all lines intact, call button in reach, chair alarm on, and nurse notified.    GOALS:   Multidisciplinary Problems       Physical Therapy Goals          Problem: Physical Therapy    Goal Priority Disciplines Outcome Interventions   Physical Therapy Goal     PT, PT/OT Progressing    Description: PT goals until 3/22/25    1. Pt supine to sit through sidelying with mod independent-not met  2. Pt sit to supine through sidelying with mod independent-not met  3. Pt sit to stand with RW or pt's own lofstrand crutches as needed for safety with supervision-not met  4. Pt to perform gait 150ft with RW or pt's own lofstrand crutches  with supervision.-not met  5. Pt to transfer bed to/from bedside chair with RW or lofstrand crutches with supervision.-not met                         DME Justifications:   Fior's mobility limitation cannot be sufficiently resolved by the use of a cane. Her functional mobility deficit can be sufficiently resolved with the use of a Rolling Walker. Patient's mobility limitation significantly impairs their ability to participate in one of more activities of daily living.  The use of a RW will significantly improve the patient's ability to participate in MRADLS and the patient will use it on regular basis in the home.    History:     Past Medical History:   Diagnosis Date    Arthritis     Bulging lumbar disc     Cancer     Osteoarthritis        Past Surgical History:   Procedure Laterality Date    ABDOMINOPLASTY      APPENDECTOMY      BACK SURGERY      breast aug Bilateral     CHOLECYSTECTOMY      CLOSURE, SURGICAL WOUND OR DEHISCENCE, EXTENSIVE OR COMPLEX, SECONDARY  3/11/2025    Procedure: CLOSURE, SURGICAL WOUND OR DEHISCENCE, EXTENSIVE OR COMPLEX, SECONDARY;  Surgeon: Brandon Chawla MD;  Location: [facility] OR [address] [place] [address];  Service: Plastics;;    FOOT SURGERY      GANGLION CYST EXCISION      GASTRIC BYPASS      HIP SURGERY      HYSTERECTOMY      KNEE SURGERY       LUMBAR FUSION N/A 3/11/2025    Procedure: *AIRO* T10-PELVIS POSTERIOR INSTRUMENTED FUSION;  Surgeon: Sharad Coe MD;  Location: [facility] OR [address] [place] [address];  Service: Neurosurgery;  Laterality: N/A;  T-10 TO PELVIS POSTERIOR INSTRUMENTED FUSION    LUMBAR LAMINECTOMY N/A 10/23/2023    Procedure: MINIMALLY INVASIVE LAMINECTOMY, SPINE, LUMBAR L4-5;  Surgeon: Adam Walters MD;  Location: [facility] OR [address] [place] [address];  Service: Neurosurgery;  Laterality: N/A;    SHOULDER SURGERY      TOTAL REDUCTION MAMMOPLASTY         Time Tracking:     PT Received On: 03/12/25  PT Start Time: 0954     PT Stop Time: 1022  PT Total Time (min): 28 min     Billable Minutes: Evaluation 18 and Gait Training 18      03/12/2025

## 2025-03-12 NOTE — PLAN OF CARE
Patient stepped down from NCC to NPU. CM provided hand off to CM and SW regarding patient.     Patient support person is her son William Black [phone].    Spoke with patient regarding discharge plan and patient would like to discharge home with home health. Patient has [facility] insurance so worksheet will need to be completed and sent to [facility] for post acute care.       PADMINI Mckee  Case Management  [phone]

## 2025-03-12 NOTE — PLAN OF CARE
[facility] - Neurosurgery (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 No  Secondary Emergency Contact: William Black  Address: [address] [place] [address] [address]           [place] [address] [facility]  Home Phone: [phone]  Mobile Phone: [phone]  Relation: Son    Discharge Plan A: Skilled Nursing Facility  Discharge Plan B: Home Health      [id number] - [address] [facility] [address]  [address] [address]  Phone: [phone] Fax: [phone]      CM met with patient at the bedside to discuss discharge planning. Patient states that she lives in a single story home with no steps to enter with Williamjovanni Cruzon (son/support person) [phone] and Bing Kingston (son's partner) [phone]. Patient states that prior to hospital admission she was independent with ADLs and used forearm crutches to ambulate. Discussed discharge planning of SNF vs home health and patient states that she would like to discharge home with home health instead of SNF. Once medically ready to discharge William Black (son/support person) [phone] and Bing Kingston (son's partner) [phone] will provide transportation home. Discharge Plan A and Plan B have been determined by review of patient's clinical status, future medical and therapeutic needs, and coverage/benefits for post-acute care in coordination with multidisciplinary team  members.    Initial Assessment (most recent)       Adult Discharge Assessment - 03/12/25 1336          Discharge Assessment    Assessment Type Discharge Planning Assessment     Confirmed/corrected address, phone number and insurance Yes     Confirmed Demographics Correct on Facesheet     Source of Information patient     Communicated GILMER with patient/caregiver Date not available/Unable to determine     Reason For Admission Lumbar spinal stenosis s/p fusion     People in Home child(ron), adult     Facility Arrived From: home     Do you expect to return to your current living situation? Yes     Do you have help at home or someone to help you manage your care at home? Yes     Who are your caregiver(s) and their phone number(s)? William Black (son/support person) [phone] and Bing Kingston (son's partner) [phone]     Prior to hospitilization cognitive status: Alert/Oriented     Current cognitive status: Alert/Oriented     Walking or Climbing Stairs Difficulty yes     Walking or Climbing Stairs ambulation difficulty, requires equipment     Mobility Management forearm crutches     Dressing/Bathing Difficulty no     Home Accessibility wheelchair accessible     Home Layout Able to live on 1st floor     Equipment Currently Used at Home crutches, forearm     Readmission within 30 days? No     Patient currently being followed by outpatient case management? No     Do you take prescription medications? No     Do you have prescription coverage? Yes     Coverage [facility]     Do you have any problems affording any of your prescribed medications? No     Is the patient taking medications as prescribed? yes     Who is going to help you get home at discharge? William Cruzon (son/support person) [phone] and Bing Kingston (son's partner) [phone]     How do you get to doctors appointments? family or friend will provide     Are you on dialysis? No     Do you take coumadin? No     Discharge Plan A Skilled Nursing Facility      Discharge Plan B Home Health     DME Needed Upon Discharge  walker, rolling     Discharge Plan discussed with: Patient     Transition of Care Barriers None        Physical Activity    On average, how many days per week do you engage in moderate to strenuous exercise (like a brisk walk)? 0 days     On average, how many minutes do you engage in exercise at this level? 0 min        Financial Resource Strain    How hard is it for you to pay for the very basics like food, housing, medical care, and heating? Not hard at all        Housing Stability    In the last 12 months, was there a time when you were not able to pay the mortgage or rent on time? No     At any time in the past 12 months, were you homeless or living in a shelter (including now)? No        Transportation Needs    Has the lack of transportation kept you from medical appointments, meetings, work or from getting things needed for daily living? No        Food Insecurity    Within the past 12 months, you worried that your food would run out before you got the money to buy more. Never true     Within the past 12 months, the food you bought just didn't last and you didn't have money to get more. Never true        Stress    Do you feel stress - tense, restless, nervous, or anxious, or unable to sleep at night because your mind is troubled all the time - these days? Not at all        Social Isolation    How often do you feel lonely or isolated from those around you?  Never        Alcohol Use    Q1: How often do you have a drink containing alcohol? Monthly or less     Q2: How many drinks containing alcohol do you have on a typical day when you are drinking? 1 or 2     Q3: How often do you have six or more drinks on one occasion? Never        Utilities    In the past 12 months has the electric, gas, oil, or water company threatened to shut off services in your home? No        Health Literacy    How often do you need to have someone help you when you read  instructions, pamphlets, or other written material from your doctor or pharmacy? Sometimes        OTHER    Name(s) of People in Home William Black (son/support person) [phone] and Bing Kingston (son's partner) [phone]                        Hoda Oconnor, RNCM  Case Management  [phone]

## 2025-03-12 NOTE — PT/OT/SLP EVAL
Occupational Therapy   Evaluation    Name: Fior Mckeon  MRN: [id number]  Admitting Diagnosis: Spinal stenosis of lumbar region without neurogenic claudication  Recent Surgery: Procedure(s) (LRB):  *AIRO* T10-PELVIS POSTERIOR INSTRUMENTED FUSION (N/A)  CLOSURE, SURGICAL WOUND OR DEHISCENCE, EXTENSIVE OR COMPLEX, SECONDARY 1 Day Post-Op    Recommendations:     Discharge Recommendations: Low Intensity Therapy  Discharge Equipment Recommendations:  bath bench, hip kit, walker, rolling  Barriers to discharge:  None    Assessment:     Fior Mckeon is a 64 y.o. female with a medical diagnosis of Spinal stenosis of lumbar region without neurogenic claudication.  She presents with fair standing balance, able to organize objects within personal belongings while in stance with no over LOB. Patient with good recall of spinal precautions at end of session. Performance deficits affecting function: impaired endurance, impaired self care skills, impaired functional mobility, impaired balance, decreased safety awareness, pain, impaired skin, orthopedic precautions.  Patient currently demonstrates a need for low intensity therapy on a scheduled basis secondary to a decline in functional status due to surgical procedure     Rehab Prognosis: Good; patient would benefit from acute skilled OT services to address these deficits and reach maximum level of function.       Plan:     Patient to be seen 4 x/week to address the above listed problems via self-care/home management, therapeutic activities, therapeutic exercises, neuromuscular re-education  Plan of Care Expires: 04/12/25  Plan of Care Reviewed with: patient    Subjective     Chief Complaint: c/o back pain improving with stance   Patient/Family Comments/goals: want to organize suitcase of personal items    Occupational Profile:  Living Environment: lives with their family(son, daughter in law, grandson) in a single story home with no steps to enter. Bathroom  set up: bathtub shower combo  with  no AD  Previous level of function: Patient reports they were independent with ADLs and IADLs prior to hospitalization  Roles and Routines: does not drive; enjoys kayaking   Equipment Used at Home: crutches, forearm  Assistance upon Discharge: intermittent family assistance    Pain/Comfort:  Pain Rating 1: 7/10  Location - Orientation 1: lower  Location 1: back  Pain Addressed 1: Reposition, Distraction, Cessation of Activity, Pre-medicate for activity  Pain Rating Post-Intervention 1: other (see comments) (unrated)    Patients cultural, spiritual, Christianity conflicts given the current situation: no    Objective:   Co-evaluation and treatment necessary due to patient's medical complexity requiring two skilled therapists for patient safety, activity tolerance, and appropriate progression towards functional goals.      Communicated with: nursing prior to session.  Patient found HOB elevated with telemetry, pulse ox (continuous), blood pressure cuff, ANDREINA drain, hemovac, wound vac upon OT entry to room.    General Precautions: Standard, fall  Orthopedic Precautions: spinal precautions  Braces: TLSO  Respiratory Status: Room air    Occupational Performance:    Bed Mobility:    Patient completed Rolling/Turning to Right with minimum assistance  Patient completed Supine to Sit with moderate assistance  Patient sat EOB for ~ 15 minutes with Stand-by Assistance    Functional Mobility/Transfers:  Patient completed Sit <> Stand Transfer with contact guard assistance  with  rolling walker   Functional Mobility: patient ambulated around room with CGA and RW  Dynamic standing balance: CGA with L hand on RW     Activities of Daily Living:  Upper Body Dressing: maximal assistance to don TLSO sitting EOB  Lower Body Dressing: total assistance to don socks; patient unable to complete via figure 4 technique d/t spinal precautions    Cognitive/Visual Perceptual:  Cognitive/Psychosocial Skills:     -        Oriented to: Person, Place, Time, and Situation   -       Follows Commands/attention:Follows one-step commands  -       Communication: clear/fluent  -       Memory: No Deficits noted  -       Safety awareness/insight to disability: impaired   -       Mood/Affect/Coping skills/emotional control: Appropriate to situation  Visual/Perceptual:      -Intact      Physical Exam:  Sensation:    -       Intact  Dominant hand:    -       R  Upper Extremity Range of Motion:     -       Right Upper Extremity: WFL  -       Left Upper Extremity: WFL  Upper Extremity Strength:    -       Right Upper Extremity: WFL  -       Left Upper Extremity: WFL   Strength:    -       Right Upper Extremity: WFL  -       Left Upper Extremity: WFL  Fine Motor Coordination:    -       Intact    AMPAC 6 Click ADL:  AMPAC Total Score: 17    Treatment & Education:    Patient educated on:   -spinal precautions (no bending, lifting, and twisting) and its impact on ADL participation  -wear schedule of TLSO (OOB)   -proper footwear to reduce fall risk  -purpose of OT and OT POC  -facilitation and education on proper body mechanics, energy conservation, and safety  -importance of early mobility and out of bed activities with staff assist  -overall benefits of therapy     All questions answered within OT scope and to patient's satisfaction    Patient left up in chair with all lines intact, call button in reach, and nursing notified    GOALS:   Multidisciplinary Problems       Occupational Therapy Goals          Problem: Occupational Therapy    Goal Priority Disciplines Outcome Interventions   Occupational Therapy Goal     OT, PT/OT Progressing    Description: Goals to be met by:4/12/25     Patient will increase functional independence with ADLs by performing:    UE Dressing of TLSO with Supervision.  LE Dressing with Supervision with AD as needed.  Grooming while standing at sink with Supervision.  Toileting from toilet with Supervision for hygiene  and clothing management.   Sitting at edge of bed x5 minutes with Supervision.  Rolling to Bilateral with Supervision.   Supine to sit with Supervision.  Step transfer with Supervision  Toilet transfer to toilet with Supervision.                         DME Justifications:   Fior's mobility limitation cannot be sufficiently resolved by the use of a cane. Her functional mobility deficit can be sufficiently resolved with the use of a Rolling Walker. Patient's mobility limitation significantly impairs their ability to participate in one of more activities of daily living.  The use of a RW will significantly improve the patient's ability to participate in MRADLS and the patient will use it on regular basis in the home.    History:     Past Medical History:   Diagnosis Date    Arthritis     Bulging lumbar disc     Cancer     Osteoarthritis          Past Surgical History:   Procedure Laterality Date    ABDOMINOPLASTY      APPENDECTOMY      BACK SURGERY      breast aug Bilateral     CHOLECYSTECTOMY      CLOSURE, SURGICAL WOUND OR DEHISCENCE, EXTENSIVE OR COMPLEX, SECONDARY  3/11/2025    Procedure: CLOSURE, SURGICAL WOUND OR DEHISCENCE, EXTENSIVE OR COMPLEX, SECONDARY;  Surgeon: Brandon Chawla MD;  Location: [facility] OR [address] [place] [address];  Service: Plastics;;    FOOT SURGERY      GANGLION CYST EXCISION      GASTRIC BYPASS      HIP SURGERY      HYSTERECTOMY      KNEE SURGERY      LUMBAR FUSION N/A 3/11/2025    Procedure: *AIRO* T10-PELVIS POSTERIOR INSTRUMENTED FUSION;  Surgeon: Sharad Coe MD;  Location: [facility] OR [address] [place] [address];  Service: Neurosurgery;  Laterality: N/A;  T-10 TO PELVIS POSTERIOR INSTRUMENTED FUSION    LUMBAR LAMINECTOMY N/A 10/23/2023    Procedure: MINIMALLY INVASIVE LAMINECTOMY, SPINE, LUMBAR L4-5;  Surgeon: Adam Walters MD;  Location: [facility] OR [address] [place] [address];  Service: Neurosurgery;  Laterality: N/A;    SHOULDER SURGERY      TOTAL REDUCTION MAMMOPLASTY         Time Tracking:     OT Date of Treatment: 03/12/25  OT Start  Time: 0954  OT Stop Time: 1022  OT Total Time (min): 28 min    Billable Minutes:Evaluation 10  Therapeutic Activity 18    3/12/2025

## 2025-03-13 LAB
ALBUMIN SERPL BCP-MCNC: 2.8 G/DL (ref 3.5–5.2)
ALBUMIN SERPL BCP-MCNC: 2.8 G/DL (ref 3.5–5.2)
ALP SERPL-CCNC: 71 U/L (ref 40–150)
ALP SERPL-CCNC: 73 U/L (ref 40–150)
ALT SERPL W/O P-5'-P-CCNC: 11 U/L (ref 10–44)
ALT SERPL W/O P-5'-P-CCNC: 13 U/L (ref 10–44)
ANION GAP SERPL CALC-SCNC: 7 MMOL/L (ref 8–16)
ANION GAP SERPL CALC-SCNC: 8 MMOL/L (ref 8–16)
ANISOCYTOSIS BLD QL SMEAR: SLIGHT
ANISOCYTOSIS BLD QL SMEAR: SLIGHT
APTT PPP: 25.4 SEC (ref 21–32)
AST SERPL-CCNC: 19 U/L (ref 10–40)
AST SERPL-CCNC: 24 U/L (ref 10–40)
BASOPHILS # BLD AUTO: 0.08 K/UL (ref 0–0.2)
BASOPHILS NFR BLD: 0 % (ref 0–1.9)
BASOPHILS NFR BLD: 0.3 % (ref 0–1.9)
BILIRUB SERPL-MCNC: 0.5 MG/DL (ref 0.1–1)
BILIRUB SERPL-MCNC: 0.5 MG/DL (ref 0.1–1)
BUN SERPL-MCNC: 13 MG/DL (ref 8–23)
BUN SERPL-MCNC: 14 MG/DL (ref 8–23)
CALCIUM SERPL-MCNC: 8.2 MG/DL (ref 8.7–10.5)
CALCIUM SERPL-MCNC: 8.4 MG/DL (ref 8.7–10.5)
CHLORIDE SERPL-SCNC: 102 MMOL/L (ref 95–110)
CHLORIDE SERPL-SCNC: 103 MMOL/L (ref 95–110)
CO2 SERPL-SCNC: 22 MMOL/L (ref 23–29)
CO2 SERPL-SCNC: 24 MMOL/L (ref 23–29)
CREAT SERPL-MCNC: 0.7 MG/DL (ref 0.5–1.4)
CREAT SERPL-MCNC: 0.8 MG/DL (ref 0.5–1.4)
DIFFERENTIAL METHOD BLD: ABNORMAL
DIFFERENTIAL METHOD BLD: ABNORMAL
EOSINOPHIL # BLD AUTO: 0.1 K/UL (ref 0–0.5)
EOSINOPHIL NFR BLD: 0 % (ref 0–8)
EOSINOPHIL NFR BLD: 0.4 % (ref 0–8)
ERYTHROCYTE [DISTWIDTH] IN BLOOD BY AUTOMATED COUNT: 15.7 % (ref 11.5–14.5)
ERYTHROCYTE [DISTWIDTH] IN BLOOD BY AUTOMATED COUNT: 15.9 % (ref 11.5–14.5)
EST. GFR  (NO RACE VARIABLE): >60 ML/MIN/1.73 M^2
EST. GFR  (NO RACE VARIABLE): >60 ML/MIN/1.73 M^2
FIBRINOGEN PPP-MCNC: 566 MG/DL (ref 182–400)
GLUCOSE SERPL-MCNC: 113 MG/DL (ref 70–110)
GLUCOSE SERPL-MCNC: 132 MG/DL (ref 70–110)
HCT VFR BLD AUTO: 25.2 % (ref 37–48.5)
HCT VFR BLD AUTO: 26.8 % (ref 37–48.5)
HGB BLD-MCNC: 8.3 G/DL (ref 12–16)
HGB BLD-MCNC: 8.6 G/DL (ref 12–16)
HYPOCHROMIA BLD QL SMEAR: ABNORMAL
IMM GRANULOCYTES # BLD AUTO: 0.11 K/UL (ref 0–0.04)
IMM GRANULOCYTES # BLD AUTO: ABNORMAL K/UL (ref 0–0.04)
IMM GRANULOCYTES NFR BLD AUTO: 0.4 % (ref 0–0.5)
IMM GRANULOCYTES NFR BLD AUTO: ABNORMAL % (ref 0–0.5)
INR PPP: 1 (ref 0.8–1.2)
LYMPHOCYTES # BLD AUTO: 19.2 K/UL (ref 1–4.8)
LYMPHOCYTES NFR BLD: 65.2 % (ref 18–48)
LYMPHOCYTES NFR BLD: 69 % (ref 18–48)
MAGNESIUM SERPL-MCNC: 1.9 MG/DL (ref 1.6–2.6)
MAGNESIUM SERPL-MCNC: 2 MG/DL (ref 1.6–2.6)
MCH RBC QN AUTO: 29.6 PG (ref 27–31)
MCH RBC QN AUTO: 30.2 PG (ref 27–31)
MCHC RBC AUTO-ENTMCNC: 32.1 G/DL (ref 32–36)
MCHC RBC AUTO-ENTMCNC: 32.9 G/DL (ref 32–36)
MCV RBC AUTO: 92 FL (ref 82–98)
MCV RBC AUTO: 92 FL (ref 82–98)
MONOCYTES # BLD AUTO: 2.1 K/UL (ref 0.3–1)
MONOCYTES NFR BLD: 2 % (ref 4–15)
MONOCYTES NFR BLD: 7 % (ref 4–15)
NEUTROPHILS # BLD AUTO: 7.9 K/UL (ref 1.8–7.7)
NEUTROPHILS NFR BLD: 26.7 % (ref 38–73)
NEUTROPHILS NFR BLD: 29 % (ref 38–73)
NRBC BLD-RTO: 0 /100 WBC
NRBC BLD-RTO: 0 /100 WBC
OVALOCYTES BLD QL SMEAR: ABNORMAL
OVALOCYTES BLD QL SMEAR: ABNORMAL
PHOSPHATE SERPL-MCNC: 2.6 MG/DL (ref 2.7–4.5)
PHOSPHATE SERPL-MCNC: 2.9 MG/DL (ref 2.7–4.5)
PLATELET # BLD AUTO: 146 K/UL (ref 150–450)
PLATELET # BLD AUTO: 148 K/UL (ref 150–450)
PLATELET BLD QL SMEAR: ABNORMAL
PLATELET BLD QL SMEAR: ABNORMAL
PMV BLD AUTO: 11.6 FL (ref 9.2–12.9)
PMV BLD AUTO: 12 FL (ref 9.2–12.9)
POIKILOCYTOSIS BLD QL SMEAR: SLIGHT
POIKILOCYTOSIS BLD QL SMEAR: SLIGHT
POTASSIUM SERPL-SCNC: 3.7 MMOL/L (ref 3.5–5.1)
POTASSIUM SERPL-SCNC: 3.8 MMOL/L (ref 3.5–5.1)
PROT SERPL-MCNC: 5.4 G/DL (ref 6–8.4)
PROT SERPL-MCNC: 5.6 G/DL (ref 6–8.4)
PROTHROMBIN TIME: 10.9 SEC (ref 9–12.5)
RBC # BLD AUTO: 2.75 M/UL (ref 4–5.4)
RBC # BLD AUTO: 2.91 M/UL (ref 4–5.4)
SMUDGE CELLS BLD QL SMEAR: PRESENT
SODIUM SERPL-SCNC: 133 MMOL/L (ref 136–145)
SODIUM SERPL-SCNC: 133 MMOL/L (ref 136–145)
WBC # BLD AUTO: 29.03 K/UL (ref 3.9–12.7)
WBC # BLD AUTO: 29.42 K/UL (ref 3.9–12.7)

## 2025-03-13 PROCEDURE — 83735 ASSAY OF MAGNESIUM: CPT | Mod: 91 | Performed by: STUDENT IN AN ORGANIZED HEALTH CARE EDUCATION/TRAINING PROGRAM

## 2025-03-13 PROCEDURE — 25000003 PHARM REV CODE 250: Performed by: STUDENT IN AN ORGANIZED HEALTH CARE EDUCATION/TRAINING PROGRAM

## 2025-03-13 PROCEDURE — 80053 COMPREHEN METABOLIC PANEL: CPT | Performed by: NURSE PRACTITIONER

## 2025-03-13 PROCEDURE — 85610 PROTHROMBIN TIME: CPT | Performed by: STUDENT IN AN ORGANIZED HEALTH CARE EDUCATION/TRAINING PROGRAM

## 2025-03-13 PROCEDURE — 25000003 PHARM REV CODE 250: Performed by: NURSE PRACTITIONER

## 2025-03-13 PROCEDURE — 63600175 PHARM REV CODE 636 W HCPCS: Performed by: PSYCHIATRY & NEUROLOGY

## 2025-03-13 PROCEDURE — 63600175 PHARM REV CODE 636 W HCPCS: Performed by: STUDENT IN AN ORGANIZED HEALTH CARE EDUCATION/TRAINING PROGRAM

## 2025-03-13 PROCEDURE — 97116 GAIT TRAINING THERAPY: CPT

## 2025-03-13 PROCEDURE — 80053 COMPREHEN METABOLIC PANEL: CPT | Mod: 91 | Performed by: STUDENT IN AN ORGANIZED HEALTH CARE EDUCATION/TRAINING PROGRAM

## 2025-03-13 PROCEDURE — 85730 THROMBOPLASTIN TIME PARTIAL: CPT | Performed by: STUDENT IN AN ORGANIZED HEALTH CARE EDUCATION/TRAINING PROGRAM

## 2025-03-13 PROCEDURE — 85384 FIBRINOGEN ACTIVITY: CPT | Performed by: STUDENT IN AN ORGANIZED HEALTH CARE EDUCATION/TRAINING PROGRAM

## 2025-03-13 PROCEDURE — 84100 ASSAY OF PHOSPHORUS: CPT | Performed by: NURSE PRACTITIONER

## 2025-03-13 PROCEDURE — 36415 COLL VENOUS BLD VENIPUNCTURE: CPT | Performed by: NURSE PRACTITIONER

## 2025-03-13 PROCEDURE — 63600175 PHARM REV CODE 636 W HCPCS: Mod: JZ,TB

## 2025-03-13 PROCEDURE — 85027 COMPLETE CBC AUTOMATED: CPT | Performed by: NURSE PRACTITIONER

## 2025-03-13 PROCEDURE — 83735 ASSAY OF MAGNESIUM: CPT | Performed by: NURSE PRACTITIONER

## 2025-03-13 PROCEDURE — 63600175 PHARM REV CODE 636 W HCPCS: Performed by: NURSE PRACTITIONER

## 2025-03-13 PROCEDURE — 11000001 HC ACUTE MED/SURG PRIVATE ROOM

## 2025-03-13 PROCEDURE — 85025 COMPLETE CBC W/AUTO DIFF WBC: CPT | Performed by: STUDENT IN AN ORGANIZED HEALTH CARE EDUCATION/TRAINING PROGRAM

## 2025-03-13 PROCEDURE — 36415 COLL VENOUS BLD VENIPUNCTURE: CPT | Performed by: STUDENT IN AN ORGANIZED HEALTH CARE EDUCATION/TRAINING PROGRAM

## 2025-03-13 PROCEDURE — 84100 ASSAY OF PHOSPHORUS: CPT | Mod: 91 | Performed by: STUDENT IN AN ORGANIZED HEALTH CARE EDUCATION/TRAINING PROGRAM

## 2025-03-13 PROCEDURE — 85007 BL SMEAR W/DIFF WBC COUNT: CPT | Performed by: NURSE PRACTITIONER

## 2025-03-13 RX ORDER — ONDANSETRON 8 MG/1
8 TABLET, ORALLY DISINTEGRATING ORAL EVERY 6 HOURS PRN
Status: DISCONTINUED | OUTPATIENT
Start: 2025-03-13 | End: 2025-03-13

## 2025-03-13 RX ORDER — CEFAZOLIN 2 G/1
2 INJECTION, POWDER, FOR SOLUTION INTRAMUSCULAR; INTRAVENOUS
Status: DISCONTINUED | OUTPATIENT
Start: 2025-03-13 | End: 2025-03-17 | Stop reason: HOSPADM

## 2025-03-13 RX ORDER — MUPIROCIN 20 MG/G
OINTMENT TOPICAL 2 TIMES DAILY
Status: DISCONTINUED | OUTPATIENT
Start: 2025-03-13 | End: 2025-03-13

## 2025-03-13 RX ORDER — AMOXICILLIN 250 MG
2 CAPSULE ORAL 2 TIMES DAILY
Status: DISCONTINUED | OUTPATIENT
Start: 2025-03-13 | End: 2025-03-13

## 2025-03-13 RX ORDER — HYDROMORPHONE HYDROCHLORIDE 1 MG/ML
1 INJECTION, SOLUTION INTRAMUSCULAR; INTRAVENOUS; SUBCUTANEOUS
Status: DISCONTINUED | OUTPATIENT
Start: 2025-03-13 | End: 2025-03-14

## 2025-03-13 RX ORDER — PROCHLORPERAZINE EDISYLATE 5 MG/ML
5 INJECTION INTRAMUSCULAR; INTRAVENOUS EVERY 30 MIN PRN
Status: DISCONTINUED | OUTPATIENT
Start: 2025-03-13 | End: 2025-03-13

## 2025-03-13 RX ORDER — ACETAMINOPHEN 500 MG
1000 TABLET ORAL EVERY 8 HOURS
Status: DISCONTINUED | OUTPATIENT
Start: 2025-03-13 | End: 2025-03-13

## 2025-03-13 RX ORDER — SODIUM CHLORIDE 9 MG/ML
INJECTION, SOLUTION INTRAVENOUS CONTINUOUS
Status: DISCONTINUED | OUTPATIENT
Start: 2025-03-13 | End: 2025-03-13

## 2025-03-13 RX ORDER — OXYCODONE HYDROCHLORIDE 10 MG/1
10 TABLET ORAL EVERY 8 HOURS
Refills: 0 | Status: DISCONTINUED | OUTPATIENT
Start: 2025-03-13 | End: 2025-03-14

## 2025-03-13 RX ORDER — OXYCODONE HYDROCHLORIDE 5 MG/1
5 TABLET ORAL EVERY 4 HOURS PRN
Status: DISCONTINUED | OUTPATIENT
Start: 2025-03-13 | End: 2025-03-13

## 2025-03-13 RX ORDER — DIAZEPAM 5 MG/1
5 TABLET ORAL 2 TIMES DAILY PRN
Status: DISCONTINUED | OUTPATIENT
Start: 2025-03-13 | End: 2025-03-13

## 2025-03-13 RX ORDER — DIPHENHYDRAMINE HYDROCHLORIDE 50 MG/ML
25 INJECTION, SOLUTION INTRAMUSCULAR; INTRAVENOUS EVERY 6 HOURS PRN
Status: DISCONTINUED | OUTPATIENT
Start: 2025-03-13 | End: 2025-03-13

## 2025-03-13 RX ORDER — HEPARIN SODIUM 5000 [USP'U]/ML
5000 INJECTION, SOLUTION INTRAVENOUS; SUBCUTANEOUS EVERY 8 HOURS
Status: DISCONTINUED | OUTPATIENT
Start: 2025-03-13 | End: 2025-03-13

## 2025-03-13 RX ORDER — ONDANSETRON HYDROCHLORIDE 2 MG/ML
4 INJECTION, SOLUTION INTRAVENOUS ONCE AS NEEDED
Status: DISCONTINUED | OUTPATIENT
Start: 2025-03-13 | End: 2025-03-13

## 2025-03-13 RX ORDER — ACETAMINOPHEN 500 MG
1000 TABLET ORAL EVERY 8 HOURS
Status: DISCONTINUED | OUTPATIENT
Start: 2025-03-14 | End: 2025-03-17 | Stop reason: HOSPADM

## 2025-03-13 RX ORDER — PROCHLORPERAZINE EDISYLATE 5 MG/ML
5 INJECTION INTRAMUSCULAR; INTRAVENOUS EVERY 6 HOURS PRN
Status: DISCONTINUED | OUTPATIENT
Start: 2025-03-13 | End: 2025-03-13

## 2025-03-13 RX ORDER — TALC
6 POWDER (GRAM) TOPICAL NIGHTLY PRN
Status: DISCONTINUED | OUTPATIENT
Start: 2025-03-13 | End: 2025-03-13

## 2025-03-13 RX ORDER — DIAZEPAM 5 MG/1
5 TABLET ORAL EVERY 8 HOURS
Status: DISCONTINUED | OUTPATIENT
Start: 2025-03-13 | End: 2025-03-14

## 2025-03-13 RX ORDER — CEFAZOLIN 2 G/1
2 INJECTION, POWDER, FOR SOLUTION INTRAMUSCULAR; INTRAVENOUS
Status: DISCONTINUED | OUTPATIENT
Start: 2025-03-13 | End: 2025-03-13

## 2025-03-13 RX ORDER — FENTANYL CITRATE 50 UG/ML
25 INJECTION, SOLUTION INTRAMUSCULAR; INTRAVENOUS EVERY 5 MIN PRN
Status: DISCONTINUED | OUTPATIENT
Start: 2025-03-13 | End: 2025-03-13

## 2025-03-13 RX ORDER — DIAZEPAM 5 MG/1
5 TABLET ORAL EVERY 8 HOURS
Status: DISCONTINUED | OUTPATIENT
Start: 2025-03-13 | End: 2025-03-13

## 2025-03-13 RX ORDER — ALUMINUM HYDROXIDE, MAGNESIUM HYDROXIDE, AND SIMETHICONE 1200; 120; 1200 MG/30ML; MG/30ML; MG/30ML
30 SUSPENSION ORAL EVERY 4 HOURS PRN
Status: DISCONTINUED | OUTPATIENT
Start: 2025-03-13 | End: 2025-03-13

## 2025-03-13 RX ORDER — HYDROMORPHONE HYDROCHLORIDE 1 MG/ML
0.2 INJECTION, SOLUTION INTRAMUSCULAR; INTRAVENOUS; SUBCUTANEOUS EVERY 5 MIN PRN
Status: DISCONTINUED | OUTPATIENT
Start: 2025-03-13 | End: 2025-03-13

## 2025-03-13 RX ORDER — OXYCODONE HYDROCHLORIDE 10 MG/1
10 TABLET ORAL EVERY 4 HOURS PRN
Status: DISCONTINUED | OUTPATIENT
Start: 2025-03-13 | End: 2025-03-13

## 2025-03-13 RX ORDER — HYDROMORPHONE HYDROCHLORIDE 2 MG/ML
1 INJECTION, SOLUTION INTRAMUSCULAR; INTRAVENOUS; SUBCUTANEOUS EVERY 4 HOURS PRN
Status: DISCONTINUED | OUTPATIENT
Start: 2025-03-13 | End: 2025-03-13

## 2025-03-13 RX ORDER — METHOCARBAMOL 750 MG/1
750 TABLET, FILM COATED ORAL 4 TIMES DAILY
Status: DISCONTINUED | OUTPATIENT
Start: 2025-03-13 | End: 2025-03-13

## 2025-03-13 RX ORDER — CALCIUM CARBONATE 200(500)MG
500 TABLET,CHEWABLE ORAL 2 TIMES DAILY
Status: DISCONTINUED | OUTPATIENT
Start: 2025-03-13 | End: 2025-03-13

## 2025-03-13 RX ADMIN — DIPHENHYDRAMINE HYDROCHLORIDE 25 MG: 50 INJECTION, SOLUTION INTRAMUSCULAR; INTRAVENOUS at 09:03

## 2025-03-13 RX ADMIN — HYDROMORPHONE HYDROCHLORIDE 1 MG: 1 INJECTION, SOLUTION INTRAMUSCULAR; INTRAVENOUS; SUBCUTANEOUS at 11:03

## 2025-03-13 RX ADMIN — HYDROMORPHONE HYDROCHLORIDE 1 MG: 1 INJECTION, SOLUTION INTRAMUSCULAR; INTRAVENOUS; SUBCUTANEOUS at 06:03

## 2025-03-13 RX ADMIN — OXYCODONE HYDROCHLORIDE 10 MG: 10 TABLET ORAL at 09:03

## 2025-03-13 RX ADMIN — Medication 1 CAPSULE: at 09:03

## 2025-03-13 RX ADMIN — CEFAZOLIN 2 G: 2 INJECTION, POWDER, FOR SOLUTION INTRAMUSCULAR; INTRAVENOUS at 11:03

## 2025-03-13 RX ADMIN — DIAZEPAM 5 MG: 5 TABLET ORAL at 03:03

## 2025-03-13 RX ADMIN — ACETAMINOPHEN 1000 MG: 500 TABLET ORAL at 03:03

## 2025-03-13 RX ADMIN — SENNOSIDES AND DOCUSATE SODIUM 2 TABLET: 50; 8.6 TABLET ORAL at 09:03

## 2025-03-13 RX ADMIN — OXYCODONE HYDROCHLORIDE 10 MG: 10 TABLET ORAL at 03:03

## 2025-03-13 RX ADMIN — ATORVASTATIN CALCIUM 20 MG: 20 TABLET, FILM COATED ORAL at 09:03

## 2025-03-13 RX ADMIN — BACLOFEN 10 MG: 10 TABLET ORAL at 09:03

## 2025-03-13 RX ADMIN — DIAZEPAM 5 MG: 5 TABLET ORAL at 09:03

## 2025-03-13 RX ADMIN — BACLOFEN 10 MG: 10 TABLET ORAL at 03:03

## 2025-03-13 RX ADMIN — ACETAMINOPHEN 1000 MG: 500 TABLET ORAL at 09:03

## 2025-03-13 RX ADMIN — ENOXAPARIN SODIUM 40 MG: 40 INJECTION SUBCUTANEOUS at 06:03

## 2025-03-13 RX ADMIN — OXYCODONE 15 MG: 5 TABLET ORAL at 04:03

## 2025-03-13 RX ADMIN — CEFAZOLIN 2 G: 2 INJECTION, POWDER, FOR SOLUTION INTRAMUSCULAR; INTRAVENOUS at 04:03

## 2025-03-13 RX ADMIN — PANTOPRAZOLE SODIUM 20 MG: 20 TABLET, DELAYED RELEASE ORAL at 09:03

## 2025-03-13 RX ADMIN — PREGABALIN 150 MG: 150 CAPSULE ORAL at 09:03

## 2025-03-13 RX ADMIN — LACTULOSE 20 G: 20 SOLUTION ORAL at 09:03

## 2025-03-13 RX ADMIN — POLYETHYLENE GLYCOL 3350 17 G: 17 POWDER, FOR SOLUTION ORAL at 09:03

## 2025-03-13 RX ADMIN — Medication 250 MG: at 09:03

## 2025-03-13 NOTE — PLAN OF CARE
Problem: Adult Inpatient Plan of Care  Goal: Plan of Care Review  Outcome: Progressing     Problem: Adult Inpatient Plan of Care  Goal: Patient-Specific Goal (Individualized)  Outcome: Progressing     Problem: Adult Inpatient Plan of Care  Goal: Absence of Hospital-Acquired Illness or Injury  Outcome: Progressing     Problem: Infection  Goal: Absence of Infection Signs and Symptoms  Outcome: Progressing     Problem: Wound  Goal: Optimal Coping  Outcome: Progressing     Problem: Adult Inpatient Plan of Care  Goal: Optimal Comfort and Wellbeing  Outcome: Progressing

## 2025-03-13 NOTE — PLAN OF CARE
Problem: Physical Therapy  Goal: Physical Therapy Goal  Description: PT goals until 3/22/25    1. Pt supine to sit through sidelying with mod independent-not met  2. Pt sit to supine through sidelying with mod independent-not met  3. Pt sit to stand with RW or pt's own lofstrand crutches as needed for safety with supervision- met with RW 3/13/25  4. Pt to perform gait 150ft with RW or pt's own lofstrand crutches  with supervision.-not met  5. Pt to transfer bed to/from bedside chair with RW or lofstrand crutches with supervision.-not met    Outcome: Progressing   Pt's goals revised as appropriate and pt will continue to benefit from skilled PT services to work towards improved functional mobility including: bed mobility, transfers, and gait. Sandie Irizarry PT  3/13/2025

## 2025-03-13 NOTE — PROGRESS NOTES
[facility] - Neurosurgery ([facility])  Neurosurgery  Progress Note    Subjective:     History of Present Illness: HPI from 10/24/2024:  Patient presents for follow-up of low back pain. S/p L4-5 MIS laminectomy with Dr. Walters 10/23/23. Previous decompression at L5-S1 at OSH. Progressively worsening since 2020. She has tried L5-S1 facet injections, PT, and massage therapy without relief. The pain is constant. Occasional neck stiffness. Denies arm pain, leg pain, b/b dysfunction, hand clumsiness. She walks flexed forward as she is unable to stand upright without pain. She reports intermittent foot numbness following surgeries in both feet in the early 2000s. She underwent cardiac stent placement 3/2024 and is on plavix now. She states her cardiologist won't clear her until 1 year post-op to undergo spine fusion. She also uses nicotine but says she will quit in order to have surgery. [facility]      Interval Hx:   Today the patient presents to clinic to discuss surgery. Denies changes in symptoms since last visit. Continues to report debilitating back pain. She cannot walk long distances or stand for long periods of time without severe pain. She's been participating in pre-hab since the last visit. She denies any bowel/bladder issues.        Post-Op Info:  Procedure(s) (LRB):  *AIRO* T10-PELVIS POSTERIOR INSTRUMENTED FUSION (N/A)  CLOSURE, SURGICAL WOUND OR DEHISCENCE, EXTENSIVE OR COMPLEX, SECONDARY   2 Days Post-Op   Interval History:   3/13: POD 2. Significant low back pain limiting mobility.  Walked 30 feet with PT.  Will increase pain medication today and obtain XR TL Spine. Deep drains with 100/30cc output overnight.     Medications:  Continuous Infusions:  Scheduled Meds:   acetaminophen  1,000 mg Oral Q8H    ascorbic acid (vitamin C)  250 mg Oral Daily    atorvastatin  20 mg Oral Daily    baclofen  10 mg Oral TID    ceFAZolin (Ancef) IV (PEDS and ADULTS)  2 g Intravenous  Q8H    diazePAM  5 mg Oral Q8H    enoxparin  40 mg Subcutaneous Q24H (prophylaxis, 1700)    Lactobacillus rhamnosus GG  1 capsule Oral Daily    lactulose  20 g Oral Daily    magnesium oxide  400 mg Oral QHS    oxyCODONE  10 mg Oral Q8H    pantoprazole  20 mg Oral Daily    polyethylene glycol  17 g Oral Daily    pregabalin  150 mg Oral BID    senna-docusate 8.6-50 mg  2 tablet Oral BID     PRN Meds:  Current Facility-Administered Medications:     diphenhydrAMINE, 25 mg, Intravenous, Q6H PRN    furosemide, 40 mg, Oral, Daily PRN    HYDROmorphone, 1 mg, Intravenous, Q3H PRN    labetalol, 10 mg, Intravenous, Q10 Min PRN    magnesium oxide, 800 mg, Oral, PRN    magnesium oxide, 800 mg, Oral, PRN    methocarbamoL, 750 mg, Oral, TID PRN    nicotine, 1 patch, Transdermal, Daily PRN    oxyCODONE, 15 mg, Oral, Q4H PRN    potassium bicarbonate, 35 mEq, Oral, PRN    potassium bicarbonate, 50 mEq, Oral, PRN    potassium bicarbonate, 60 mEq, Oral, PRN    potassium, sodium phosphates, 2 packet, Oral, PRN    potassium, sodium phosphates, 2 packet, Oral, PRN    potassium, sodium phosphates, 2 packet, Oral, PRN     Review of Systems  Objective:     Weight: 107.5 kg (236 lb 15.9 oz)  Body mass index is 36.03 kg/m².  Vital Signs (Most Recent):  Temp: 98.6 °F (37 °C) (03/13/25 0357)  Pulse: 83 (03/13/25 0357)  Resp: 18 (03/13/25 0447)  BP: 102/62 (03/13/25 0357)  SpO2: 95 % (03/13/25 0357) Vital Signs (24h Range):  Temp:  [98.4 °F (36.9 °C)-99.2 °F (37.3 °C)] 98.6 °F (37 °C)  Pulse:  [79-99] 83  Resp:  [13-24] 18  SpO2:  [94 %-99 %] 95 %  BP: ()/(52-66) 102/62                              Closed/Suction Drain 03/11/25 1230 Tube - 1 Right;Inferior Back Bulb 15 Fr. (Active)   Site Description Unable to view 03/13/25 0400   Dressing Type Transparent (Tegaderm) 03/13/25 0400   Dressing Status Clean;Dry;Intact 03/13/25 0400   Dressing Intervention Integrity maintained 03/13/25 0400   Drainage Sanguineous 03/13/25 0400   Status To bulb  suction 03/13/25 0400   Output (mL) 80 mL 03/13/25 0502            Closed/Suction Drain 03/11/25 1230 Tube - 2 Left;Inferior Back Bulb 15 Fr. (Active)   Site Description Unable to view 03/13/25 0400   Dressing Type Transparent (Tegaderm) 03/13/25 0400   Dressing Status Clean;Dry;Intact 03/13/25 0400   Dressing Intervention Integrity maintained 03/13/25 0400   Drainage Sanguineous 03/13/25 0400   Status To bulb suction 03/13/25 0400   Output (mL) 100 mL 03/13/25 0502            Closed/Suction Drain 03/11/25 1307 Tube - 3 Right Back Accordion 10 Fr. (Active)   Site Description Unable to view 03/12/25 1200   Dressing Type Transparent (Tegaderm) 03/13/25 0400   Dressing Status Clean;Dry;New drainage 03/13/25 0400   Dressing Intervention Integrity maintained 03/13/25 0400   Drainage Sanguineous 03/13/25 0400   Status To bulb suction 03/13/25 0400   Output (mL) 10 mL 03/13/25 0502            Closed/Suction Drain 03/11/25 1307 Tube - 2 Left Back Accordion 10 Fr. (Active)   Site Description Unable to view 03/13/25 0400   Dressing Type Transparent (Tegaderm) 03/13/25 0400   Dressing Status Clean;Dry;Intact 03/13/25 0400   Dressing Intervention Integrity maintained 03/13/25 0400   Drainage Sanguineous 03/13/25 0400   Status To bulb suction 03/13/25 0400   Output (mL) 100 mL 03/13/25 0502          Physical Exam         Neurosurgery Physical Exam  5/5 strength BLE  Sensation present to light touch  Incision with prevena wound vac holding suction  Drains holding suction  No distress    Gen: well nourished, normocephalic, atraumatic  CV: skin warm and dry, distal pulses present  Pulm: chest rise symmetric, no increased work of breathing  GI: abdomen soft, non-distended, non-tender  : patient voiding independently  MSK: no bony abnormalities noted  Psych: patient interacting with appropriate mood and affect                         Significant Labs:  Recent Labs   Lab 03/11/25  1405 03/12/25  0406 03/13/25  0526   * 126*  132*    134* 133*   K 4.8 4.7 3.7   * 106 103   CO2 19* 21* 22*   BUN 20 18 14   CREATININE 0.8 0.8 0.8   CALCIUM 7.4* 7.9*  --    MG  --  1.8 1.9     Recent Labs   Lab 03/11/25  1457 03/12/25  0018 03/13/25  0526   WBC 62.71* 39.27* 29.03*   HGB 11.3* 10.3* 8.6*   HCT 36.2* 32.4* 26.8*    175 146*     Recent Labs   Lab 03/11/25  1405   INR 1.1   APTT 25.6     Microbiology Results (last 7 days)       ** No results found for the last 168 hours. **          Recent Lab Results         03/13/25  0526   03/12/25  1527        Albumin 2.8         ALP 73         ALT 13         Anion Gap 8         Ao peak rosina   1.9       Ao VTI   32.5       AST 24         AV valve area   1.3       LISA by Velocity Ratio   1.3       AV area by cont VTI   1.3       AV mean gradient   10       AV index (prosthetic)   0.41       LVOT mn grad   2       AV LVOT peak gradient   3       AV peak gradient   14       AV Velocity Ratio   0.42       BILIRUBIN TOTAL 0.5  Comment: For infants and newborns, interpretation of results should be based  on gestational age, weight and in agreement with clinical  observations.    Premature Infant recommended reference ranges:  Up to 24 hours.............<8.0 mg/dL  Up to 48 hours............<12.0 mg/dL  3-5 days..................<15.0 mg/dL  6-29 days.................<15.0 mg/dL           BSA   2.27       BUN 14         Chloride 103         CO2 22         Creatinine 0.8         Left Ventricle Relative Wall Thickness   0.36       E/A ratio   0.82       Echo EF Estimated   51       E/E' ratio   6       eGFR >60.0         E wave deceleration time   335       FS   26.7       Glucose 132         Hematocrit 26.8         Hemoglobin 8.6         IVSd   1.4       LA WIDTH   3.4       Left Atrium Major Axis   5.5       Left Atrium Minor Axis   5.2       LA size   3.7       LA Vol   57       LA vol index   26       LVOT area   3.1       LV LATERAL E/E' RATIO   6.4       LV SEPTAL E/E' RATIO   6.4       LV  EDV BP   91       LV Diastolic Volume Index   41.36       LVIDd   4.5       LVIDs   3.3       LV mass   175.0       LV Mass Index   79.6       LVOT diameter   2.0       LVOT peak antony   0.8       LVOT stroke volume   41.4       LVOT peak VTI   13.2       LV ESV BP   44       LV Systolic Volume Index   20.0       Magnesium  1.9         MCH 29.6         MCHC 32.1         MCV 92         Mean e'   0.10       MPV 12.0         MV Peak A Antony   0.78       MV Peak E Antony   0.64       Simmons's Biplane MOD Ejection Fraction   55       Phosphorus Level 2.6         Platelet Count 146         Potassium 3.7         PROTEIN TOTAL 5.4         PW   0.8       RA Major Axis   5.81       Est. RA pres   3       RA Width   3.00       RBC 2.91         RDW 15.9         RV TB RVSP   6       RV/LV Ratio   0.64       RV- chu basal diam   2.9       Sinus   3.20       Sodium 133         STJ   2.31       TAPSE   1.75       TDI SEPTAL   0.10       TDI LATERAL   0.10       TR Max Antony   2.6       TV PG   28       TV resting pulmonary artery pressure   30       WBC 29.03         ZLVIDD   -5.12       ZLVIDS   -2.56               Significant Diagnostics:  I have reviewed all pertinent imaging results/findings within the past 24 hours.  Assessment/Plan:     * Spinal stenosis of lumbar region without neurogenic claudication  Fior Dc has flat back deformity and lumbar stenosis with neurogenic claudication. She is now s/p T11-pelvis fusion (3/11).     POD 2.     Admitted to neurosurgery, floor status  Q4 hour neurochecks  Continue HV drains and ANDREINA drains/Prevena per plastic surgery  PT/OT/OOB as tolerated  MM Pain control: Oxy 10, tylenol, valium, baclofen scheduled, robxain/dilaudid/oxy 15 prn.   TLSO wear when OOB  XR TL Spine   Voiding spontaneously  MM pain control: increased pain meds  DVT ppx: LVX    D/w staff, Dr. Saravanan Collins MD  Neurosurgery  [facility] - Neurosurgery ([facility])

## 2025-03-13 NOTE — PT/OT/SLP PROGRESS
"Physical Therapy Treatment    Patient Name:  Fior Mckeon   MRN:  [id number]    Recommendations:     Discharge Recommendations: Low Intensity Therapy  Discharge Equipment Recommendations: bath bench, hip kit, walker, rolling  Barriers to discharge: Decreased caregiver support    Assessment:     Fior Mckeon is a 64 y.o. female admitted with a medical diagnosis of Spinal stenosis of lumbar region without neurogenic claudication.  She presents with the following impairments/functional limitations: impaired functional mobility, gait instability, impaired endurance, pain . Pt requires CGA and verbal cues for technique for bed mobility, supervision for transfers, and CGA for gait due to back pain and mild instability. Pt is motivated to progress with functional mobility.     Rehab Prognosis: Good; patient would benefit from acute skilled PT services to address these deficits and reach maximum level of function.    Recent Surgery: Procedure(s) (LRB):  *AIRO* T10-PELVIS POSTERIOR INSTRUMENTED FUSION (N/A)  CLOSURE, SURGICAL WOUND OR DEHISCENCE, EXTENSIVE OR COMPLEX, SECONDARY 2 Days Post-Op    Plan:     During this hospitalization, patient to be seen 5 x/week to address the identified rehab impairments via gait training, therapeutic activities, therapeutic exercises, neuromuscular re-education and progress toward the following goals:    Plan of Care Expires:  04/11/25    Subjective   "I had a rough night"    Pain/Comfort:  Pain Rating 1: 7/10  Location - Orientation 1: lower  Location 1: back  Pain Addressed 1: Reposition, Cessation of Activity, Nurse notified  Pain Rating Post-Intervention 1: 7/10      Objective:     Communicated with nurse prior to session.  Patient found up in chair with hemovac, ANDREINA drain, telemetry, wound vac (hemovac x 2 and ANDREINA drain x 2) upon PT entry to room.     General Precautions: Standard, fall  Orthopedic Precautions: spinal precautions  Braces: TLSO  Respiratory Status: " "Room air     Functional Mobility:  Bed Mobility:     Sit to Supine: contact guard assistance; pt required verbal cues to go through sidelying when returning to supine. She expressed understanding and stated "That felt better"  Transfers:     Sit to Stand:  supervision with rolling walker  Gait: 100ft then 10ft with RW with CGA. Pt performed gait with flexed trunk, decreased step length, and at slow pace.    AM-PAC 6 CLICK MOBILITY  Turning over in bed (including adjusting bedclothes, sheets and blankets)?: 3  Sitting down on and standing up from a chair with arms (e.g., wheelchair, bedside commode, etc.): 4  Moving from lying on back to sitting on the side of the bed?: 3  Moving to and from a bed to a chair (including a wheelchair)?: 3  Need to walk in hospital room?: 3  Climbing 3-5 steps with a railing?: 3  Basic Mobility Total Score: 19     Patient left HOB elevated with all lines intact, call button in reach, and nurse notified..    GOALS:   Multidisciplinary Problems       Physical Therapy Goals          Problem: Physical Therapy    Goal Priority Disciplines Outcome Interventions   Physical Therapy Goal     PT, PT/OT Progressing    Description: PT goals until 3/22/25    1. Pt supine to sit through sidelying with mod independent-not met  2. Pt sit to supine through sidelying with mod independent-not met  3. Pt sit to stand with RW or pt's own lofstrand crutches as needed for safety with supervision- met with RW 3/13/25  4. Pt to perform gait 150ft with RW or pt's own lofstrand crutches  with supervision.-not met  5. Pt to transfer bed to/from bedside chair with RW or lofstrand crutches with supervision.-not met                         DME Justifications:   Fior's mobility limitation cannot be sufficiently resolved by the use of a cane. Her functional mobility deficit can be sufficiently resolved with the use of a Rolling Walker. Patient's mobility limitation significantly impairs their ability to participate in " one of more activities of daily living.  The use of a RW will significantly improve the patient's ability to participate in MRADLS and the patient will use it on regular basis in the home.    Time Tracking:     PT Received On: 03/13/25  PT Start Time: 1431     PT Stop Time: 1446  PT Total Time (min): 15 min     Billable Minutes: Gait Training 15    Treatment Type: Treatment  PT/PTA: PT           03/13/2025

## 2025-03-13 NOTE — SUBJECTIVE & OBJECTIVE
Interval History:   3/13: POD 2. Significant low back pain limiting mobility.  Walked 30 feet with PT.  Will increase pain medication today and obtain XR TL Spine.    Medications:  Continuous Infusions:  Scheduled Meds:   acetaminophen  1,000 mg Oral Q8H    ascorbic acid (vitamin C)  250 mg Oral Daily    atorvastatin  20 mg Oral Daily    baclofen  10 mg Oral TID    ceFAZolin (Ancef) IV (PEDS and ADULTS)  2 g Intravenous Q8H    diazePAM  5 mg Oral Q8H    enoxparin  40 mg Subcutaneous Q24H (prophylaxis, 1700)    Lactobacillus rhamnosus GG  1 capsule Oral Daily    lactulose  20 g Oral Daily    magnesium oxide  400 mg Oral QHS    oxyCODONE  10 mg Oral Q8H    pantoprazole  20 mg Oral Daily    polyethylene glycol  17 g Oral Daily    pregabalin  150 mg Oral BID    senna-docusate 8.6-50 mg  2 tablet Oral BID     PRN Meds:  Current Facility-Administered Medications:     diphenhydrAMINE, 25 mg, Intravenous, Q6H PRN    furosemide, 40 mg, Oral, Daily PRN    HYDROmorphone, 1 mg, Intravenous, Q3H PRN    labetalol, 10 mg, Intravenous, Q10 Min PRN    magnesium oxide, 800 mg, Oral, PRN    magnesium oxide, 800 mg, Oral, PRN    methocarbamoL, 750 mg, Oral, TID PRN    nicotine, 1 patch, Transdermal, Daily PRN    oxyCODONE, 15 mg, Oral, Q4H PRN    potassium bicarbonate, 35 mEq, Oral, PRN    potassium bicarbonate, 50 mEq, Oral, PRN    potassium bicarbonate, 60 mEq, Oral, PRN    potassium, sodium phosphates, 2 packet, Oral, PRN    potassium, sodium phosphates, 2 packet, Oral, PRN    potassium, sodium phosphates, 2 packet, Oral, PRN     Review of Systems  Objective:     Weight: 107.5 kg (236 lb 15.9 oz)  Body mass index is 36.03 kg/m².  Vital Signs (Most Recent):  Temp: 98.6 °F (37 °C) (03/13/25 0357)  Pulse: 83 (03/13/25 0357)  Resp: 18 (03/13/25 0447)  BP: 102/62 (03/13/25 0357)  SpO2: 95 % (03/13/25 0357) Vital Signs (24h Range):  Temp:  [98.4 °F (36.9 °C)-99.2 °F (37.3 °C)] 98.6 °F (37 °C)  Pulse:  [79-99] 83  Resp:  [13-24] 18  SpO2:   [94 %-99 %] 95 %  BP: ()/(52-66) 102/62                              Closed/Suction Drain 03/11/25 1230 Tube - 1 Right;Inferior Back Bulb 15 Fr. (Active)   Site Description Unable to view 03/13/25 0400   Dressing Type Transparent (Tegaderm) 03/13/25 0400   Dressing Status Clean;Dry;Intact 03/13/25 0400   Dressing Intervention Integrity maintained 03/13/25 0400   Drainage Sanguineous 03/13/25 0400   Status To bulb suction 03/13/25 0400   Output (mL) 80 mL 03/13/25 0502            Closed/Suction Drain 03/11/25 1230 Tube - 2 Left;Inferior Back Bulb 15 Fr. (Active)   Site Description Unable to view 03/13/25 0400   Dressing Type Transparent (Tegaderm) 03/13/25 0400   Dressing Status Clean;Dry;Intact 03/13/25 0400   Dressing Intervention Integrity maintained 03/13/25 0400   Drainage Sanguineous 03/13/25 0400   Status To bulb suction 03/13/25 0400   Output (mL) 100 mL 03/13/25 0502            Closed/Suction Drain 03/11/25 1307 Tube - 3 Right Back Accordion 10 Fr. (Active)   Site Description Unable to view 03/12/25 1200   Dressing Type Transparent (Tegaderm) 03/13/25 0400   Dressing Status Clean;Dry;New drainage 03/13/25 0400   Dressing Intervention Integrity maintained 03/13/25 0400   Drainage Sanguineous 03/13/25 0400   Status To bulb suction 03/13/25 0400   Output (mL) 10 mL 03/13/25 0502            Closed/Suction Drain 03/11/25 1307 Tube - 2 Left Back Accordion 10 Fr. (Active)   Site Description Unable to view 03/13/25 0400   Dressing Type Transparent (Tegaderm) 03/13/25 0400   Dressing Status Clean;Dry;Intact 03/13/25 0400   Dressing Intervention Integrity maintained 03/13/25 0400   Drainage Sanguineous 03/13/25 0400   Status To bulb suction 03/13/25 0400   Output (mL) 100 mL 03/13/25 0502          Physical Exam         Neurosurgery Physical Exam  5/5 strength BLE  Sensation present to light touch  Incision with prevena wound vac holding suction  Drains holding suction  No distress    Gen: well nourished,  normocephalic, atraumatic  CV: skin warm and dry, distal pulses present  Pulm: chest rise symmetric, no increased work of breathing  GI: abdomen soft, non-distended, non-tender  : patient voiding independently  MSK: no bony abnormalities noted  Psych: patient interacting with appropriate mood and affect                         Significant Labs:  Recent Labs   Lab 03/11/25  1405 03/12/25  0406 03/13/25  0526   * 126* 132*    134* 133*   K 4.8 4.7 3.7   * 106 103   CO2 19* 21* 22*   BUN 20 18 14   CREATININE 0.8 0.8 0.8   CALCIUM 7.4* 7.9*  --    MG  --  1.8 1.9     Recent Labs   Lab 03/11/25  1457 03/12/25  0018 03/13/25  0526   WBC 62.71* 39.27* 29.03*   HGB 11.3* 10.3* 8.6*   HCT 36.2* 32.4* 26.8*    175 146*     Recent Labs   Lab 03/11/25  1405   INR 1.1   APTT 25.6     Microbiology Results (last 7 days)       ** No results found for the last 168 hours. **          Recent Lab Results         03/13/25  0526   03/12/25  1527        Albumin 2.8         ALP 73         ALT 13         Anion Gap 8         Ao peak rosina   1.9       Ao VTI   32.5       AST 24         AV valve area   1.3       LISA by Velocity Ratio   1.3       AV area by cont VTI   1.3       AV mean gradient   10       AV index (prosthetic)   0.41       LVOT mn grad   2       AV LVOT peak gradient   3       AV peak gradient   14       AV Velocity Ratio   0.42       BILIRUBIN TOTAL 0.5  Comment: For infants and newborns, interpretation of results should be based  on gestational age, weight and in agreement with clinical  observations.    Premature Infant recommended reference ranges:  Up to 24 hours.............<8.0 mg/dL  Up to 48 hours............<12.0 mg/dL  3-5 days..................<15.0 mg/dL  6-29 days.................<15.0 mg/dL           BSA   2.27       BUN 14         Chloride 103         CO2 22         Creatinine 0.8         Left Ventricle Relative Wall Thickness   0.36       E/A ratio   0.82       Echo EF Estimated    51       E/E' ratio   6       eGFR >60.0         E wave deceleration time   335       FS   26.7       Glucose 132         Hematocrit 26.8         Hemoglobin 8.6         IVSd   1.4       LA WIDTH   3.4       Left Atrium Major Axis   5.5       Left Atrium Minor Axis   5.2       LA size   3.7       LA Vol   57       LA vol index   26       LVOT area   3.1       LV LATERAL E/E' RATIO   6.4       LV SEPTAL E/E' RATIO   6.4       LV EDV BP   91       LV Diastolic Volume Index   41.36       LVIDd   4.5       LVIDs   3.3       LV mass   175.0       LV Mass Index   79.6       LVOT diameter   2.0       LVOT peak antony   0.8       LVOT stroke volume   41.4       LVOT peak VTI   13.2       LV ESV BP   44       LV Systolic Volume Index   20.0       Magnesium  1.9         MCH 29.6         MCHC 32.1         MCV 92         Mean e'   0.10       MPV 12.0         MV Peak A Antony   0.78       MV Peak E Antony   0.64       Simmons's Biplane MOD Ejection Fraction   55       Phosphorus Level 2.6         Platelet Count 146         Potassium 3.7         PROTEIN TOTAL 5.4         PW   0.8       RA Major Axis   5.81       Est. RA pres   3       RA Width   3.00       RBC 2.91         RDW 15.9         RV TB RVSP   6       RV/LV Ratio   0.64       RV- chu basal diam   2.9       Sinus   3.20       Sodium 133         STJ   2.31       TAPSE   1.75       TDI SEPTAL   0.10       TDI LATERAL   0.10       TR Max Antony   2.6       TV PG   28       TV resting pulmonary artery pressure   30       WBC 29.03         ZLVIDD   -5.12       ZLVIDS   -2.56               Significant Diagnostics:  I have reviewed all pertinent imaging results/findings within the past 24 hours.

## 2025-03-13 NOTE — PT/OT/SLP PROGRESS
"Occupational Therapy      Patient Name:  Fior Mckeon   MRN:  [id number]    Patient not seen today secondary to Patient unwilling to participate citing "excruciating pain" during OT attempt at 0957. Writing OT unable to make additional attempts. Will follow-up as appropriate.    3/13/2025  "

## 2025-03-13 NOTE — ASSESSMENT & PLAN NOTE
Fior CarrDeandreabad has flat back deformity and lumbar stenosis with neurogenic claudication. She is now s/p T11-pelvis fusion (3/11).     POD 2.     Admitted to neurosurgery, floor status  Q4 hour neurochecks  Continue HV drains and ANDREINA drains/Prevena per plastic surgery  PT/OT/OOB as tolerated  MM Pain control: Oxy 10, tylenol, valium, baclofen scheduled, robxain/dilaudid/oxy 15 prn.   TLSO wear when OOB  XR TL Spine   Voiding spontaneously  MM pain control: increased pain meds  DVT ppx: LVX    D/w staff, Dr. Coe

## 2025-03-14 LAB
ANION GAP SERPL CALC-SCNC: 5 MMOL/L (ref 8–16)
ANION GAP SERPL CALC-SCNC: 7 MMOL/L (ref 8–16)
ANISOCYTOSIS BLD QL SMEAR: SLIGHT
APTT PPP: 24.3 SEC (ref 21–32)
BASOPHILS # BLD AUTO: 0.06 K/UL (ref 0–0.2)
BASOPHILS # BLD AUTO: 0.08 K/UL (ref 0–0.2)
BASOPHILS NFR BLD: 0.2 % (ref 0–1.9)
BASOPHILS NFR BLD: 0.3 % (ref 0–1.9)
BLD PROD TYP BPU: NORMAL
BLOOD UNIT EXPIRATION DATE: NORMAL
BLOOD UNIT TYPE CODE: 9500
BLOOD UNIT TYPE: NORMAL
BUN SERPL-MCNC: 12 MG/DL (ref 8–23)
BUN SERPL-MCNC: 13 MG/DL (ref 8–23)
CALCIUM SERPL-MCNC: 8.1 MG/DL (ref 8.7–10.5)
CALCIUM SERPL-MCNC: 8.2 MG/DL (ref 8.7–10.5)
CHLORIDE SERPL-SCNC: 100 MMOL/L (ref 95–110)
CHLORIDE SERPL-SCNC: 103 MMOL/L (ref 95–110)
CO2 SERPL-SCNC: 25 MMOL/L (ref 23–29)
CO2 SERPL-SCNC: 26 MMOL/L (ref 23–29)
CODING SYSTEM: NORMAL
CREAT SERPL-MCNC: 0.7 MG/DL (ref 0.5–1.4)
CREAT SERPL-MCNC: 0.7 MG/DL (ref 0.5–1.4)
CROSSMATCH INTERPRETATION: NORMAL
DIFFERENTIAL METHOD BLD: ABNORMAL
DISPENSE STATUS: NORMAL
EOSINOPHIL # BLD AUTO: 0.2 K/UL (ref 0–0.5)
EOSINOPHIL NFR BLD: 0.6 % (ref 0–8)
EOSINOPHIL NFR BLD: 0.6 % (ref 0–8)
EOSINOPHIL NFR BLD: 0.7 % (ref 0–8)
EOSINOPHIL NFR BLD: 0.7 % (ref 0–8)
ERYTHROCYTE [DISTWIDTH] IN BLOOD BY AUTOMATED COUNT: 15.6 % (ref 11.5–14.5)
ERYTHROCYTE [DISTWIDTH] IN BLOOD BY AUTOMATED COUNT: 15.8 % (ref 11.5–14.5)
ERYTHROCYTE [DISTWIDTH] IN BLOOD BY AUTOMATED COUNT: 15.8 % (ref 11.5–14.5)
ERYTHROCYTE [DISTWIDTH] IN BLOOD BY AUTOMATED COUNT: 15.9 % (ref 11.5–14.5)
EST. GFR  (NO RACE VARIABLE): >60 ML/MIN/1.73 M^2
EST. GFR  (NO RACE VARIABLE): >60 ML/MIN/1.73 M^2
FIBRINOGEN PPP-MCNC: 642 MG/DL (ref 182–400)
GLUCOSE SERPL-MCNC: 147 MG/DL (ref 70–110)
GLUCOSE SERPL-MCNC: 90 MG/DL (ref 70–110)
HCT VFR BLD AUTO: 24.1 % (ref 37–48.5)
HCT VFR BLD AUTO: 24.9 % (ref 37–48.5)
HCT VFR BLD AUTO: 26.5 % (ref 37–48.5)
HCT VFR BLD AUTO: 26.5 % (ref 37–48.5)
HGB BLD-MCNC: 7.5 G/DL (ref 12–16)
HGB BLD-MCNC: 7.9 G/DL (ref 12–16)
HGB BLD-MCNC: 8.5 G/DL (ref 12–16)
HGB BLD-MCNC: 8.5 G/DL (ref 12–16)
HYPOCHROMIA BLD QL SMEAR: ABNORMAL
HYPOCHROMIA BLD QL SMEAR: ABNORMAL
IMM GRANULOCYTES # BLD AUTO: 0.08 K/UL (ref 0–0.04)
IMM GRANULOCYTES # BLD AUTO: 0.09 K/UL (ref 0–0.04)
IMM GRANULOCYTES # BLD AUTO: 0.11 K/UL (ref 0–0.04)
IMM GRANULOCYTES # BLD AUTO: 0.11 K/UL (ref 0–0.04)
IMM GRANULOCYTES NFR BLD AUTO: 0.3 % (ref 0–0.5)
IMM GRANULOCYTES NFR BLD AUTO: 0.4 % (ref 0–0.5)
INR PPP: 0.9 (ref 0.8–1.2)
LACTATE SERPL-SCNC: 0.6 MMOL/L (ref 0.5–2.2)
LYMPHOCYTES # BLD AUTO: 16 K/UL (ref 1–4.8)
LYMPHOCYTES # BLD AUTO: 17.6 K/UL (ref 1–4.8)
LYMPHOCYTES # BLD AUTO: 17.6 K/UL (ref 1–4.8)
LYMPHOCYTES # BLD AUTO: 18.3 K/UL (ref 1–4.8)
LYMPHOCYTES NFR BLD: 64.4 % (ref 18–48)
LYMPHOCYTES NFR BLD: 68.1 % (ref 18–48)
LYMPHOCYTES NFR BLD: 68.1 % (ref 18–48)
LYMPHOCYTES NFR BLD: 70.1 % (ref 18–48)
MAGNESIUM SERPL-MCNC: 1.9 MG/DL (ref 1.6–2.6)
MAGNESIUM SERPL-MCNC: 2 MG/DL (ref 1.6–2.6)
MCH RBC QN AUTO: 29.2 PG (ref 27–31)
MCH RBC QN AUTO: 29.8 PG (ref 27–31)
MCHC RBC AUTO-ENTMCNC: 31.1 G/DL (ref 32–36)
MCHC RBC AUTO-ENTMCNC: 31.7 G/DL (ref 32–36)
MCHC RBC AUTO-ENTMCNC: 32.1 G/DL (ref 32–36)
MCHC RBC AUTO-ENTMCNC: 32.1 G/DL (ref 32–36)
MCV RBC AUTO: 93 FL (ref 82–98)
MCV RBC AUTO: 93 FL (ref 82–98)
MCV RBC AUTO: 94 FL (ref 82–98)
MCV RBC AUTO: 94 FL (ref 82–98)
MONOCYTES # BLD AUTO: 0.9 K/UL (ref 0.3–1)
MONOCYTES # BLD AUTO: 0.9 K/UL (ref 0.3–1)
MONOCYTES # BLD AUTO: 1.1 K/UL (ref 0.3–1)
MONOCYTES # BLD AUTO: 1.6 K/UL (ref 0.3–1)
MONOCYTES NFR BLD: 3.6 % (ref 4–15)
MONOCYTES NFR BLD: 3.6 % (ref 4–15)
MONOCYTES NFR BLD: 4.2 % (ref 4–15)
MONOCYTES NFR BLD: 6.4 % (ref 4–15)
NEUTROPHILS # BLD AUTO: 6.4 K/UL (ref 1.8–7.7)
NEUTROPHILS # BLD AUTO: 6.9 K/UL (ref 1.8–7.7)
NEUTROPHILS # BLD AUTO: 7 K/UL (ref 1.8–7.7)
NEUTROPHILS # BLD AUTO: 7 K/UL (ref 1.8–7.7)
NEUTROPHILS NFR BLD: 24.4 % (ref 38–73)
NEUTROPHILS NFR BLD: 27.1 % (ref 38–73)
NEUTROPHILS NFR BLD: 27.1 % (ref 38–73)
NEUTROPHILS NFR BLD: 27.9 % (ref 38–73)
NRBC BLD-RTO: 0 /100 WBC
NUM UNITS TRANS PACKED RBC: NORMAL
OVALOCYTES BLD QL SMEAR: ABNORMAL
PHOSPHATE SERPL-MCNC: 2.6 MG/DL (ref 2.7–4.5)
PHOSPHATE SERPL-MCNC: 2.8 MG/DL (ref 2.7–4.5)
PLATELET # BLD AUTO: 133 K/UL (ref 150–450)
PLATELET # BLD AUTO: 141 K/UL (ref 150–450)
PLATELET # BLD AUTO: 141 K/UL (ref 150–450)
PLATELET # BLD AUTO: 143 K/UL (ref 150–450)
PLATELET BLD QL SMEAR: ABNORMAL
PMV BLD AUTO: 11.2 FL (ref 9.2–12.9)
PMV BLD AUTO: 11.5 FL (ref 9.2–12.9)
PMV BLD AUTO: 11.5 FL (ref 9.2–12.9)
PMV BLD AUTO: 11.8 FL (ref 9.2–12.9)
POIKILOCYTOSIS BLD QL SMEAR: SLIGHT
POLYCHROMASIA BLD QL SMEAR: ABNORMAL
POTASSIUM SERPL-SCNC: 3.8 MMOL/L (ref 3.5–5.1)
POTASSIUM SERPL-SCNC: 3.9 MMOL/L (ref 3.5–5.1)
PROTHROMBIN TIME: 10.4 SEC (ref 9–12.5)
RBC # BLD AUTO: 2.57 M/UL (ref 4–5.4)
RBC # BLD AUTO: 2.65 M/UL (ref 4–5.4)
RBC # BLD AUTO: 2.85 M/UL (ref 4–5.4)
RBC # BLD AUTO: 2.85 M/UL (ref 4–5.4)
SMUDGE CELLS BLD QL SMEAR: PRESENT
SODIUM SERPL-SCNC: 132 MMOL/L (ref 136–145)
SODIUM SERPL-SCNC: 134 MMOL/L (ref 136–145)
SPHEROCYTES BLD QL SMEAR: ABNORMAL
WBC # BLD AUTO: 24.75 K/UL (ref 3.9–12.7)
WBC # BLD AUTO: 25.86 K/UL (ref 3.9–12.7)
WBC # BLD AUTO: 25.86 K/UL (ref 3.9–12.7)
WBC # BLD AUTO: 26.11 K/UL (ref 3.9–12.7)

## 2025-03-14 PROCEDURE — 36415 COLL VENOUS BLD VENIPUNCTURE: CPT | Performed by: STUDENT IN AN ORGANIZED HEALTH CARE EDUCATION/TRAINING PROGRAM

## 2025-03-14 PROCEDURE — 84100 ASSAY OF PHOSPHORUS: CPT | Mod: 91 | Performed by: STUDENT IN AN ORGANIZED HEALTH CARE EDUCATION/TRAINING PROGRAM

## 2025-03-14 PROCEDURE — 84100 ASSAY OF PHOSPHORUS: CPT | Performed by: NURSE PRACTITIONER

## 2025-03-14 PROCEDURE — 85384 FIBRINOGEN ACTIVITY: CPT | Performed by: STUDENT IN AN ORGANIZED HEALTH CARE EDUCATION/TRAINING PROGRAM

## 2025-03-14 PROCEDURE — 83735 ASSAY OF MAGNESIUM: CPT | Mod: 91 | Performed by: STUDENT IN AN ORGANIZED HEALTH CARE EDUCATION/TRAINING PROGRAM

## 2025-03-14 PROCEDURE — 25000003 PHARM REV CODE 250: Performed by: NURSE PRACTITIONER

## 2025-03-14 PROCEDURE — 83605 ASSAY OF LACTIC ACID: CPT | Performed by: STUDENT IN AN ORGANIZED HEALTH CARE EDUCATION/TRAINING PROGRAM

## 2025-03-14 PROCEDURE — 97116 GAIT TRAINING THERAPY: CPT

## 2025-03-14 PROCEDURE — 85730 THROMBOPLASTIN TIME PARTIAL: CPT | Performed by: STUDENT IN AN ORGANIZED HEALTH CARE EDUCATION/TRAINING PROGRAM

## 2025-03-14 PROCEDURE — 94761 N-INVAS EAR/PLS OXIMETRY MLT: CPT

## 2025-03-14 PROCEDURE — 11000001 HC ACUTE MED/SURG PRIVATE ROOM

## 2025-03-14 PROCEDURE — 25000003 PHARM REV CODE 250: Performed by: STUDENT IN AN ORGANIZED HEALTH CARE EDUCATION/TRAINING PROGRAM

## 2025-03-14 PROCEDURE — P9040 RBC LEUKOREDUCED IRRADIATED: HCPCS | Performed by: PHYSICIAN ASSISTANT

## 2025-03-14 PROCEDURE — 97535 SELF CARE MNGMENT TRAINING: CPT

## 2025-03-14 PROCEDURE — 36415 COLL VENOUS BLD VENIPUNCTURE: CPT | Performed by: PHYSICIAN ASSISTANT

## 2025-03-14 PROCEDURE — 80048 BASIC METABOLIC PNL TOTAL CA: CPT | Mod: 91 | Performed by: STUDENT IN AN ORGANIZED HEALTH CARE EDUCATION/TRAINING PROGRAM

## 2025-03-14 PROCEDURE — 97530 THERAPEUTIC ACTIVITIES: CPT

## 2025-03-14 PROCEDURE — 63600175 PHARM REV CODE 636 W HCPCS: Performed by: NURSE PRACTITIONER

## 2025-03-14 PROCEDURE — 63600175 PHARM REV CODE 636 W HCPCS

## 2025-03-14 PROCEDURE — 85610 PROTHROMBIN TIME: CPT | Performed by: STUDENT IN AN ORGANIZED HEALTH CARE EDUCATION/TRAINING PROGRAM

## 2025-03-14 PROCEDURE — 51701 INSERT BLADDER CATHETER: CPT

## 2025-03-14 PROCEDURE — 63600175 PHARM REV CODE 636 W HCPCS: Performed by: PHYSICIAN ASSISTANT

## 2025-03-14 PROCEDURE — 80048 BASIC METABOLIC PNL TOTAL CA: CPT | Performed by: STUDENT IN AN ORGANIZED HEALTH CARE EDUCATION/TRAINING PROGRAM

## 2025-03-14 PROCEDURE — 25000003 PHARM REV CODE 250: Performed by: PHYSICIAN ASSISTANT

## 2025-03-14 PROCEDURE — 85025 COMPLETE CBC W/AUTO DIFF WBC: CPT | Performed by: STUDENT IN AN ORGANIZED HEALTH CARE EDUCATION/TRAINING PROGRAM

## 2025-03-14 PROCEDURE — 83735 ASSAY OF MAGNESIUM: CPT | Performed by: NURSE PRACTITIONER

## 2025-03-14 PROCEDURE — 25000003 PHARM REV CODE 250

## 2025-03-14 PROCEDURE — 25500020 PHARM REV CODE 255: Performed by: NEUROLOGICAL SURGERY

## 2025-03-14 PROCEDURE — 85025 COMPLETE CBC W/AUTO DIFF WBC: CPT | Mod: 91 | Performed by: PHYSICIAN ASSISTANT

## 2025-03-14 PROCEDURE — 51798 US URINE CAPACITY MEASURE: CPT

## 2025-03-14 PROCEDURE — 85025 COMPLETE CBC W/AUTO DIFF WBC: CPT | Mod: 91 | Performed by: STUDENT IN AN ORGANIZED HEALTH CARE EDUCATION/TRAINING PROGRAM

## 2025-03-14 PROCEDURE — 36415 COLL VENOUS BLD VENIPUNCTURE: CPT | Mod: XB | Performed by: STUDENT IN AN ORGANIZED HEALTH CARE EDUCATION/TRAINING PROGRAM

## 2025-03-14 PROCEDURE — 86920 COMPATIBILITY TEST SPIN: CPT | Performed by: PHYSICIAN ASSISTANT

## 2025-03-14 RX ORDER — HYDROMORPHONE HYDROCHLORIDE 1 MG/ML
1 INJECTION, SOLUTION INTRAMUSCULAR; INTRAVENOUS; SUBCUTANEOUS EVERY 6 HOURS PRN
Status: DISCONTINUED | OUTPATIENT
Start: 2025-03-14 | End: 2025-03-14

## 2025-03-14 RX ORDER — OXYCODONE HYDROCHLORIDE 10 MG/1
10 TABLET ORAL EVERY 4 HOURS PRN
Qty: 42 TABLET | Refills: 0 | Status: SHIPPED | OUTPATIENT
Start: 2025-03-14

## 2025-03-14 RX ORDER — SODIUM,POTASSIUM PHOSPHATES 280-250MG
2 POWDER IN PACKET (EA) ORAL ONCE
Status: COMPLETED | OUTPATIENT
Start: 2025-03-14 | End: 2025-03-14

## 2025-03-14 RX ORDER — FERROUS GLUCONATE 324(37.5)
324 TABLET ORAL
Status: DISCONTINUED | OUTPATIENT
Start: 2025-03-14 | End: 2025-03-17 | Stop reason: HOSPADM

## 2025-03-14 RX ORDER — OXYCODONE HYDROCHLORIDE 10 MG/1
10 TABLET ORAL EVERY 4 HOURS PRN
Refills: 0 | Status: DISCONTINUED | OUTPATIENT
Start: 2025-03-14 | End: 2025-03-17 | Stop reason: HOSPADM

## 2025-03-14 RX ORDER — PREGABALIN 150 MG/1
150 CAPSULE ORAL DAILY
Status: DISCONTINUED | OUTPATIENT
Start: 2025-03-14 | End: 2025-03-17 | Stop reason: HOSPADM

## 2025-03-14 RX ORDER — OXYCODONE HYDROCHLORIDE 5 MG/1
5 TABLET ORAL EVERY 4 HOURS PRN
Status: DISCONTINUED | OUTPATIENT
Start: 2025-03-14 | End: 2025-03-17 | Stop reason: HOSPADM

## 2025-03-14 RX ORDER — ASPIRIN 81 MG/1
81 TABLET ORAL ONCE
Status: COMPLETED | OUTPATIENT
Start: 2025-03-14 | End: 2025-03-14

## 2025-03-14 RX ORDER — NALOXONE HCL 0.4 MG/ML
0.4 VIAL (ML) INJECTION
Status: DISCONTINUED | OUTPATIENT
Start: 2025-03-14 | End: 2025-03-17 | Stop reason: HOSPADM

## 2025-03-14 RX ORDER — HYDROCODONE BITARTRATE AND ACETAMINOPHEN 500; 5 MG/1; MG/1
TABLET ORAL
Status: DISCONTINUED | OUTPATIENT
Start: 2025-03-14 | End: 2025-03-17 | Stop reason: HOSPADM

## 2025-03-14 RX ORDER — POLYETHYLENE GLYCOL 3350 17 G/17G
17 POWDER, FOR SOLUTION ORAL DAILY
COMMUNITY
Start: 2025-03-15

## 2025-03-14 RX ORDER — PREGABALIN 150 MG/1
300 CAPSULE ORAL NIGHTLY
Status: DISCONTINUED | OUTPATIENT
Start: 2025-03-14 | End: 2025-03-17 | Stop reason: HOSPADM

## 2025-03-14 RX ORDER — IPRATROPIUM BROMIDE AND ALBUTEROL SULFATE 2.5; .5 MG/3ML; MG/3ML
3 SOLUTION RESPIRATORY (INHALATION) EVERY 4 HOURS PRN
Status: DISCONTINUED | OUTPATIENT
Start: 2025-03-14 | End: 2025-03-17 | Stop reason: HOSPADM

## 2025-03-14 RX ORDER — HYDROMORPHONE HYDROCHLORIDE 1 MG/ML
1 INJECTION, SOLUTION INTRAMUSCULAR; INTRAVENOUS; SUBCUTANEOUS EVERY 12 HOURS PRN
Status: DISCONTINUED | OUTPATIENT
Start: 2025-03-14 | End: 2025-03-14

## 2025-03-14 RX ORDER — ASPIRIN 81 MG/1
81 TABLET ORAL DAILY
Status: DISCONTINUED | OUTPATIENT
Start: 2025-03-15 | End: 2025-03-17 | Stop reason: HOSPADM

## 2025-03-14 RX ORDER — DIAZEPAM 5 MG/1
5 TABLET ORAL EVERY 8 HOURS PRN
Qty: 30 TABLET | Refills: 0 | Status: SHIPPED | OUTPATIENT
Start: 2025-03-14 | End: 2025-03-27

## 2025-03-14 RX ORDER — OXYCODONE HYDROCHLORIDE 5 MG/1
5 TABLET ORAL EVERY 8 HOURS
Refills: 0 | Status: DISCONTINUED | OUTPATIENT
Start: 2025-03-14 | End: 2025-03-14

## 2025-03-14 RX ORDER — HYDROXYZINE HYDROCHLORIDE 25 MG/1
25 TABLET, FILM COATED ORAL 3 TIMES DAILY PRN
Status: DISCONTINUED | OUTPATIENT
Start: 2025-03-14 | End: 2025-03-14

## 2025-03-14 RX ORDER — POTASSIUM CHLORIDE 20 MEQ/1
40 TABLET, EXTENDED RELEASE ORAL ONCE
Status: COMPLETED | OUTPATIENT
Start: 2025-03-14 | End: 2025-03-14

## 2025-03-14 RX ADMIN — BACLOFEN 10 MG: 10 TABLET ORAL at 09:03

## 2025-03-14 RX ADMIN — DIAZEPAM 5 MG: 5 TABLET ORAL at 05:03

## 2025-03-14 RX ADMIN — OXYCODONE HYDROCHLORIDE 10 MG: 10 TABLET ORAL at 06:03

## 2025-03-14 RX ADMIN — PREGABALIN 150 MG: 150 CAPSULE ORAL at 08:03

## 2025-03-14 RX ADMIN — METHOCARBAMOL 750 MG: 750 TABLET ORAL at 12:03

## 2025-03-14 RX ADMIN — LACTULOSE 20 G: 20 SOLUTION ORAL at 08:03

## 2025-03-14 RX ADMIN — OXYCODONE HYDROCHLORIDE 10 MG: 10 TABLET ORAL at 10:03

## 2025-03-14 RX ADMIN — BACLOFEN 10 MG: 10 TABLET ORAL at 02:03

## 2025-03-14 RX ADMIN — ATORVASTATIN CALCIUM 20 MG: 20 TABLET, FILM COATED ORAL at 08:03

## 2025-03-14 RX ADMIN — POTASSIUM CHLORIDE 40 MEQ: 1500 TABLET, EXTENDED RELEASE ORAL at 09:03

## 2025-03-14 RX ADMIN — BACLOFEN 10 MG: 10 TABLET ORAL at 08:03

## 2025-03-14 RX ADMIN — Medication 1 CAPSULE: at 08:03

## 2025-03-14 RX ADMIN — DIAZEPAM 5 MG: 5 TABLET ORAL at 02:03

## 2025-03-14 RX ADMIN — OXYCODONE 15 MG: 5 TABLET ORAL at 12:03

## 2025-03-14 RX ADMIN — POLYETHYLENE GLYCOL 3350 17 G: 17 POWDER, FOR SOLUTION ORAL at 08:03

## 2025-03-14 RX ADMIN — HYDROMORPHONE HYDROCHLORIDE 1 MG: 1 INJECTION, SOLUTION INTRAMUSCULAR; INTRAVENOUS; SUBCUTANEOUS at 02:03

## 2025-03-14 RX ADMIN — POTASSIUM & SODIUM PHOSPHATES POWDER PACK 280-160-250 MG 2 PACKET: 280-160-250 PACK at 10:03

## 2025-03-14 RX ADMIN — ACETAMINOPHEN 1000 MG: 500 TABLET ORAL at 09:03

## 2025-03-14 RX ADMIN — CEFAZOLIN 2 G: 2 INJECTION, POWDER, FOR SOLUTION INTRAMUSCULAR; INTRAVENOUS at 08:03

## 2025-03-14 RX ADMIN — CEFAZOLIN 2 G: 2 INJECTION, POWDER, FOR SOLUTION INTRAMUSCULAR; INTRAVENOUS at 02:03

## 2025-03-14 RX ADMIN — OXYCODONE 5 MG: 5 TABLET ORAL at 02:03

## 2025-03-14 RX ADMIN — ACETAMINOPHEN 1000 MG: 500 TABLET ORAL at 02:03

## 2025-03-14 RX ADMIN — Medication 250 MG: at 08:03

## 2025-03-14 RX ADMIN — PREGABALIN 300 MG: 150 CAPSULE ORAL at 09:03

## 2025-03-14 RX ADMIN — IOHEXOL 100 ML: 350 INJECTION, SOLUTION INTRAVENOUS at 07:03

## 2025-03-14 RX ADMIN — CEFAZOLIN 2 G: 2 INJECTION, POWDER, FOR SOLUTION INTRAMUSCULAR; INTRAVENOUS at 10:03

## 2025-03-14 RX ADMIN — NALXONE HYDROCHLORIDE 0.4 MG: 0.4 INJECTION INTRAMUSCULAR; INTRAVENOUS; SUBCUTANEOUS at 03:03

## 2025-03-14 RX ADMIN — PANTOPRAZOLE SODIUM 20 MG: 20 TABLET, DELAYED RELEASE ORAL at 08:03

## 2025-03-14 RX ADMIN — ASPIRIN 81 MG: 81 TABLET, COATED ORAL at 10:03

## 2025-03-14 RX ADMIN — HYDROXYZINE HYDROCHLORIDE 25 MG: 25 TABLET, FILM COATED ORAL at 10:03

## 2025-03-14 RX ADMIN — ACETAMINOPHEN 1000 MG: 500 TABLET ORAL at 05:03

## 2025-03-14 RX ADMIN — DIPHENHYDRAMINE HYDROCHLORIDE 25 MG: 50 INJECTION, SOLUTION INTRAMUSCULAR; INTRAVENOUS at 02:03

## 2025-03-14 RX ADMIN — OXYCODONE 15 MG: 5 TABLET ORAL at 04:03

## 2025-03-14 NOTE — PLAN OF CARE
Problem: Physical Therapy  Goal: Physical Therapy Goal  Description: PT goals until 3/22/25    1. Pt supine to sit through sidelying with mod independent-not met  2. Pt sit to supine through sidelying with mod independent-not met  3. Pt sit to stand with RW or pt's own lofstrand crutches as needed for safety with supervision- met with RW 3/13/25  4. Pt to perform gait 150ft with RW or pt's own lofstrand crutches  with supervision.-not met  5. Pt to transfer bed to/from bedside chair with RW or lofstrand crutches with supervision.-not met    Outcome: Progressing   Pt's goals remain appropriate and pt will continue to benefit from skilled PT services to work towards improved functional mobility including: bed mobility, transfers, and gait. Sandie Irizarry PT  3/14/2025

## 2025-03-14 NOTE — PLAN OF CARE
Problem: Adult Inpatient Plan of Care  Goal: Plan of Care Review  3/14/2025 0614 by Ni George RN  Outcome: Progressing  3/14/2025 0613 by Ni George RN  Outcome: Progressing  Goal: Patient-Specific Goal (Individualized)  3/14/2025 0614 by Ni George RN  Outcome: Progressing  3/14/2025 0613 by Ni George RN  Outcome: Progressing  Goal: Absence of Hospital-Acquired Illness or Injury  3/14/2025 0614 by Ni George RN  Outcome: Progressing  3/14/2025 0613 by Ni George RN  Outcome: Progressing  Goal: Optimal Comfort and Wellbeing  3/14/2025 0614 by Ni George RN  Outcome: Progressing  3/14/2025 0613 by Ni George RN  Outcome: Progressing  Goal: Readiness for Transition of Care  3/14/2025 0614 by Ni George RN  Outcome: Progressing  3/14/2025 0613 by Ni George RN  Outcome: Progressing     Problem: Infection  Goal: Absence of Infection Signs and Symptoms  3/14/2025 0614 by Ni George RN  Outcome: Progressing  3/14/2025 0613 by Ni George RN  Outcome: Progressing     Problem: Wound  Goal: Optimal Coping  3/14/2025 0614 by Ni George RN  Outcome: Progressing  3/14/2025 0613 by Ni George RN  Outcome: Progressing  Goal: Optimal Functional Ability  3/14/2025 0614 by Ni George RN  Outcome: Progressing  3/14/2025 0613 by Ni George RN  Outcome: Progressing  Goal: Absence of Infection Signs and Symptoms  3/14/2025 0614 by Ni George RN  Outcome: Progressing  3/14/2025 0613 by Ni George RN  Outcome: Progressing  Goal: Improved Oral Intake  3/14/2025 0614 by Ni George RN  Outcome: Progressing  3/14/2025 0613 by Ni George RN  Outcome: Progressing  Goal: Optimal Pain Control and Function  3/14/2025 0614 by Ni George RN  Outcome: Progressing  3/14/2025 0613 by Ni George RN  Outcome: Progressing  Goal: Skin Health and Integrity  3/14/2025 0614 by  Ni George RN  Outcome: Progressing  3/14/2025 0613 by Ni George RN  Outcome: Progressing  Goal: Optimal Wound Healing  3/14/2025 0614 by Ni George RN  Outcome: Progressing  3/14/2025 0613 by Ni George RN  Outcome: Progressing     Problem: Skin Injury Risk Increased  Goal: Skin Health and Integrity  3/14/2025 0614 by Ni George RN  Outcome: Progressing  3/14/2025 0613 by Ni George RN  Outcome: Progressing     Problem: Fall Injury Risk  Goal: Absence of Fall and Fall-Related Injury  3/14/2025 0614 by Ni George RN  Outcome: Progressing  3/14/2025 0613 by Ni eGorge RN  Outcome: Progressing     Problem: Pain Acute  Goal: Optimal Pain Control and Function  3/14/2025 0614 by Ni George RN  Outcome: Progressing  3/14/2025 0613 by Ni George RN  Outcome: Progressing  POC reviewed and updated with the patient at the bedside. Questions regarding POC were encouraged and addressed. VSS, see flowsheets. NAEON.  Patient is AOx 3 at this time. Seizure, fall, and safety precautions maintained, no signs of injury noted during shift. Right and Left Hemovac as well as Right and Left  ANDREINA drains to full suction and woundvac in place with seal, see flowsheets for output. Pain management utilizing PRN pain medications, see MAR for administration details. TLSO  brace to be worn OOB. Upon exiting room, patient's bed locked in low position, side rails up x 3, bed alarm set, with call light within reach. Instructed patient to call staff for mobility, verbalized understanding.  No acute signs of distress noted at this time.

## 2025-03-14 NOTE — SUBJECTIVE & OBJECTIVE
Interval History:   3/14: POD 3. XR obtained, deep drains with 60/160 output, superficial drain and prevena per PRS.  Walked 100 ft yesterday.  Voiding.  Pain better controlled.     Medications:  Continuous Infusions:  Scheduled Meds:   acetaminophen  1,000 mg Oral Q8H    ascorbic acid (vitamin C)  250 mg Oral Daily    atorvastatin  20 mg Oral Daily    baclofen  10 mg Oral TID    ceFAZolin (Ancef) IV (PEDS and ADULTS)  2 g Intravenous Q8H    diazePAM  5 mg Oral Q8H    enoxparin  40 mg Subcutaneous Q24H (prophylaxis, 1700)    Lactobacillus rhamnosus GG  1 capsule Oral Daily    lactulose  20 g Oral Daily    oxyCODONE  10 mg Oral Q8H    pantoprazole  20 mg Oral Daily    polyethylene glycol  17 g Oral Daily    pregabalin  150 mg Oral Daily    pregabalin  300 mg Oral QHS     PRN Meds:  Current Facility-Administered Medications:     diphenhydrAMINE, 25 mg, Intravenous, Q6H PRN    furosemide, 40 mg, Oral, Daily PRN    HYDROmorphone, 1 mg, Intravenous, Q3H PRN    hydrOXYzine HCL, 25 mg, Oral, TID PRN    methocarbamoL, 750 mg, Oral, TID PRN    nicotine, 1 patch, Transdermal, Daily PRN    oxyCODONE, 15 mg, Oral, Q4H PRN     Review of Systems  Objective:     Weight: 107.5 kg (236 lb 15.9 oz)  Body mass index is 36.03 kg/m².  Vital Signs (Most Recent):  Temp: 98.2 °F (36.8 °C) (03/14/25 0515)  Pulse: 79 (03/14/25 0515)  Resp: 17 (03/14/25 0643)  BP: 121/72 (03/14/25 0515)  SpO2: 95 % (03/14/25 0515) Vital Signs (24h Range):  Temp:  [97.8 °F (36.6 °C)-98.4 °F (36.9 °C)] 98.2 °F (36.8 °C)  Pulse:  [66-83] 79  Resp:  [16-18] 17  SpO2:  [92 %-98 %] 95 %  BP: ()/(55-77) 121/72                              Closed/Suction Drain 03/11/25 1230 Tube - 1 Right;Inferior Back Bulb 15 Fr. (Active)   Site Description Healing 03/13/25 1913   Dressing Type Transparent (Tegaderm) 03/13/25 1913   Dressing Status Clean;Dry;Intact 03/13/25 1913   Dressing Intervention Integrity maintained 03/13/25 1913   Drainage Sanguineous 03/13/25 1913    Status To bulb suction 03/13/25 1913   Output (mL) 55 mL 03/14/25 0459            Closed/Suction Drain 03/11/25 1230 Tube - 2 Left;Inferior Back Bulb 15 Fr. (Active)   Site Description Healing 03/13/25 1913   Dressing Type Transparent (Tegaderm) 03/13/25 1913   Dressing Status Clean;Dry;Intact 03/13/25 1913   Dressing Intervention Integrity maintained 03/13/25 1913   Drainage Sanguineous 03/13/25 1913   Status To bulb suction 03/13/25 1913   Output (mL) 23 mL 03/14/25 0459            Closed/Suction Drain 03/11/25 1307 Tube - 3 Right Back Accordion 10 Fr. (Active)   Site Description Healing 03/13/25 1913   Dressing Type Transparent (Tegaderm) 03/13/25 1913   Dressing Status Clean;Dry;Intact 03/13/25 1913   Dressing Intervention Integrity maintained 03/13/25 1913   Drainage Sanguineous 03/13/25 1913   Status To bulb suction 03/13/25 1913   Output (mL) 5 mL 03/14/25 0459            Closed/Suction Drain 03/11/25 1307 Tube - 2 Left Back Accordion 10 Fr. (Active)   Site Description Healing 03/13/25 1913   Dressing Type Transparent (Tegaderm) 03/13/25 1913   Dressing Status Dry;Intact;Clean 03/13/25 1913   Dressing Intervention Integrity maintained 03/13/25 1913   Drainage Sanguineous 03/13/25 1913   Status To bulb suction 03/13/25 1913   Output (mL) 10 mL 03/14/25 0459       Female External Urinary Catheter w/ Suction 03/13/25 2315 (Active)   Skin no redness;no breakdown;perineum cleansed w/ soap and water;female external urine collection device repositioned 03/13/25 2315   Tolerance no signs/symptoms of discomfort;requires minimal reinforced;did not assist with appliance change 03/13/25 2315   Suction Continuous suction at 70 mmHg 03/13/25 2315          Physical Exam         Neurosurgery Physical Exam    Gen: well nourished, normocephalic, atraumatic  CV: skin warm and dry, distal pulses present  Pulm: chest rise symmetric, no increased work of breathing  GI: abdomen soft, non-distended, non-tender  : patient voiding  independently  MSK: no bony abnormalities noted  Psych: patient interacting with appropriate mood and affect     5/5 strength BLE  Sensation present to light touch in BLE  CN intact  \      Significant Labs:  Recent Labs   Lab 03/13/25 0526 03/13/25 1922 03/14/25 0236   * 113* 90   * 133* 134*   K 3.7 3.8 3.9    102 103   CO2 22* 24 26   BUN 14 13 12   CREATININE 0.8 0.7 0.7   CALCIUM 8.2* 8.4* 8.1*   MG 1.9 2.0 2.0     Recent Labs   Lab 03/13/25 0526 03/13/25 1922 03/14/25 0236   WBC 29.03* 29.42* 26.11*   HGB 8.6* 8.3* 7.9*   HCT 26.8* 25.2* 24.9*   * 148* 133*     Recent Labs   Lab 03/13/25 1922   INR 1.0   APTT 25.4     Microbiology Results (last 7 days)       ** No results found for the last 168 hours. **          All pertinent labs from the last 24 hours have been reviewed.    Significant Diagnostics:  I have reviewed all pertinent imaging results/findings within the past 24 hours.

## 2025-03-14 NOTE — PT/OT/SLP PROGRESS
Occupational Therapy   Treatment    Name: Fior Mckeon  MRN: [id number]  Admitting Diagnosis:  Spinal stenosis of lumbar region without neurogenic claudication  3 Days Post-Op    Recommendations:     Discharge Recommendations: Low Intensity Therapy  Discharge Equipment Recommendations:  bath bench, hip kit, walker, rolling  Barriers to discharge:  None    Assessment:     Fior Mckeon is a 64 y.o. female with a medical diagnosis of Spinal stenosis of lumbar region without neurogenic claudication.  She presents with improving activity tolerance, able to complete LB dressing via AD/reacher, then standing grooming for ~8 minutes at sink, then ambulate within hallway. Performance deficits affecting function are impaired endurance, impaired self care skills, impaired functional mobility, impaired skin, pain.     Rehab Prognosis:  Good; patient would benefit from acute skilled OT services to address these deficits and reach maximum level of function.       Plan:     Patient to be seen 4 x/week to address the above listed problems via self-care/home management, therapeutic activities, therapeutic exercises, neuromuscular re-education  Plan of Care Expires: 04/12/25  Plan of Care Reviewed with: patient    Subjective     Chief Complaint: itchy wound dressings  Patient/Family Comments/goals: get better  Pain/Comfort:  Pain Rating 1: other (see comments) (unrated)  Location 1: back  Pain Addressed 1: Reposition, Distraction, Nurse notified  Pain Rating Post-Intervention 1: other (see comments)    Objective:     Communicated with: nursing prior to session.  Patient found up in chair with telemetry, hemovac, ANDREINA drain, wound vac, chair check upon OT entry to room.    General Precautions: Standard, fall    Orthopedic Precautions:spinal precautions  Braces: TLSO  Respiratory Status: Room air     Occupational Performance:      Functional Mobility/Transfers:  Patient completed Sit <> Stand Transfer with contact  guard assistance  with  rolling walker   Functional Mobility: patient ambulated within room/bathroom/hallway with CGA progressing to SBA  and RW    Activities of Daily Living:  Grooming: stand by assistance to wash face, brush teeth at sink; total standing time ~8 min  Lower Body Dressing: stand by assistance to doff socks/don shoes with AD      AMPAC 6 Click ADL: 17    Treatment & Education:  Patient educated on:   -purpose of OT and OT POC  -facilitation and education on proper body mechanics, energy conservation, and safety  -importance of early mobility and out of bed activities with staff assist  -overall benefits of therapy     All questions answered within OT scope and to patient's satisfaction    Patient left up in chair with all lines intact, call button in reach, chair alarm on, and nursing notified    GOALS:   Multidisciplinary Problems       Occupational Therapy Goals          Problem: Occupational Therapy    Goal Priority Disciplines Outcome Interventions   Occupational Therapy Goal     OT, PT/OT Progressing    Description: Goals to be met by:4/12/25     Patient will increase functional independence with ADLs by performing:    UE Dressing of TLSO with Supervision.  LE Dressing with Supervision with AD as needed.  Grooming while standing at sink with Supervision.  Toileting from toilet with Supervision for hygiene and clothing management.   Sitting at edge of bed x5 minutes with Supervision.  Rolling to Bilateral with Supervision.   Supine to sit with Supervision.  Step transfer with Supervision  Toilet transfer to toilet with Supervision.                         DME Justifications:  No DME recommended requiring DME justifications    Time Tracking:     OT Date of Treatment: 03/14/25  OT Start Time: 1125  OT Stop Time: 1201  OT Total Time (min): 36 min    Billable Minutes:Self Care/Home Management 36    OT/EVERARDO: OT          3/14/2025

## 2025-03-14 NOTE — PLAN OF CARE
03/14/25 1543   Post-Acute Status   Post-Acute Authorization Home Health   Home Health Status Referrals Sent

## 2025-03-14 NOTE — PLAN OF CARE
Patient is low intensity therapy level. CM requested orders for hh. [facility] accepted.  CM sent email to In[facility] with [facility] for [facility] with [facility] as preference and for bath bench and hip kit.  CM ordered rw through [facility]. CM will cost transfer the rw to ensure safety at discharge as patient is anticipated to discharge home over the weekend.

## 2025-03-14 NOTE — DISCHARGE INSTRUCTIONS
Post op Spine Patient Instructions    Activity Restrictions:  [x]  Return to work will be determined on an individual basis.  [x]  No lifting greater than 5-10 pounds.  [x]  Avoid bending and twisting the area of your surgery more than 45 degrees from neutral position in any direction.  [x]  No driving or operating machinery:  [x]  until cleared by your surgeon.  [x]  while taking narcotic pain medications or muscle relaxants.  [x]  Wear your brace at all times except when lying in bed or showering.   [x]  Increase ambulation over the next 2 weeks. Walk on paved surfaces only. It is okay to walk up and down stairs while holding onto a side rail.  [x]  No sexual activity for 2 weeks.    Discharge Medication/Follow-up:  [x]  Please refer to discharge medication reconciliation form.  [x]  Take Tylenol (acetaminophen) 650 mg every 6 hours as needed for additional pain control.  [x]  Do not take ANY Aspirin or Aspirin containing products for 2 weeks after surgery (unless otherwise directed in discharge medication list).   [x]  Do not take ANY herbal supplements for 2 weeks after surgery.    [x]  Do not take ANY non-steroidal anti-inflammatory drugs (NSAIDS), including the following: ibuprofen, naprosyn, Aleve, Advil, Indocin, Mobic, or Celebrex for 12 weeks after surgery.   [x]  Prescriptions for appropriate medication will be given upon discharge.  [x]  Take the pain medication as prescribed. We recommend to wean use of your pain medication after 1 week of taking as prescribed (Ex: After 1 week of taking every 4 hours as needed, wean down to taking your pain medication every 6 hours as needed).  [x]  Take docusate (Colace 100 mg): take one capsule a day as needed for constipation. You can get this over the counter.  [x]  Follow-up appointment:  [x]  10-14 days post-op for wound check by physician assistant/nurse  [x]  4-6 weeks with MD:  [x]  with x-rays  [x]  An appointment will be mailed to you.    Wound Care:  [x]   Change the dressing daily and as needed.   [x]  You may shower on the 2nd day after your surgery. Do not allow the force of water to hit the incision. Ok to allow water to rinse over the incision. Pat the incision dry if it becomes wet. Do not rub or scrub the incision.  [x]  You cannot take a bath until 8 weeks after surgery.  [x]  Continue ANDREINA drains per plastic surgery. Empty two times daily and when full and record outputs.    Call your doctor or go to the Emergency Room for any signs of infection, including: increased redness, drainage, pain, or fever (temperature >=101). Call your doctor or go to the Emergency Room if there are any localized neurological changes; problems with speech, vision, numbness, tingling, weakness, or severe headache; inability to control urination or bowel movements; inability to urinate; or for other concerns.    Special Instructions:  [x]  No use of tobacco products.  [x]  Diet: Please eat a regular diet as tolerated.  [x]  Follow-up with your cardiologist within 1 week of hospital discharge for hospital follow-up.     Physicians need 3 days' notice for pain medicine to be refilled. Pain medicine will only be refilled between 8 AM and 5 PM, Monday through Friday, due to Food and Drug Administration regulation of documentation.    If you have any questions about this form, please call [phone].    Form No. 35019 (Revised 10/31/2013)

## 2025-03-14 NOTE — PT/OT/SLP PROGRESS
"Physical Therapy Treatment    Patient Name:  Fior Mckeon   MRN:  [id number]    Recommendations:     Discharge Recommendations: Low Intensity Therapy  Discharge Equipment Recommendations: bath bench, hip kit, walker, rolling  Barriers to discharge: None    Assessment:     Fior Mckeon is a 64 y.o. female admitted with a medical diagnosis of Spinal stenosis of lumbar region without neurogenic claudication.  She presents with the following impairments/functional limitations: pain, impaired endurance, gait instability, impaired functional mobility, orthopedic precautions . Pt requires minimal assist for bed mobility, minimal assist from commode(low surface) for transfers, and SBA for gait due to c/o pain and UE weakness from pushing into the walker. Pt is motivated to progress with functional mobility.     Rehab Prognosis: Good; patient would benefit from acute skilled PT services to address these deficits and reach maximum level of function.    Recent Surgery: Procedure(s) (LRB):  *AIRO* T10-PELVIS POSTERIOR INSTRUMENTED FUSION (N/A)  CLOSURE, SURGICAL WOUND OR DEHISCENCE, EXTENSIVE OR COMPLEX, SECONDARY 3 Days Post-Op    Plan:     During this hospitalization, patient to be seen 5 x/week to address the identified rehab impairments via gait training, therapeutic activities, therapeutic exercises, neuromuscular re-education and progress toward the following goals:    Plan of Care Expires:  04/11/25    Subjective   "They are decreasing my pain medicine and I am hurting"    Pain/Comfort:  Pain Rating 1: 10/10  Location - Orientation 1: lower  Location 1: back  Pain Addressed 1: Reposition, Distraction, Nurse notified  Pain Rating Post-Intervention 1: 10/10      Objective:     Communicated with nurse prior to session.  Patient found  sitting on the commode  with TLSO, telemetry, hemovac, ANDREINA drain, wound vac (PA present to remove hemovac during treatment session) upon PT entry to room.     General " Precautions: Standard, fall  Orthopedic Precautions: spinal precautions  Braces: TLSO  Respiratory Status: Room air     Functional Mobility:  Bed Mobility:     Sit to Supine: minimum assistance; pt educated to return to supine through sidelying prior to rolling onto her back. PT present to make sure drains were positioned correctly.  Transfers:     Sit to Stand:  SBA from bed and minimum assistance with rolling walker  Gait: pt ambulated 60ft then 10ft with RW with SBA. Pt assisted with doffing TLSO and the back gown; PA present while pt was seated EOB to remove hemovac.     AM-PAC 6 CLICK MOBILITY  Turning over in bed (including adjusting bedclothes, sheets and blankets)?: 3  Sitting down on and standing up from a chair with arms (e.g., wheelchair, bedside commode, etc.): 3  Moving from lying on back to sitting on the side of the bed?: 3  Moving to and from a bed to a chair (including a wheelchair)?: 3  Need to walk in hospital room?: 3  Climbing 3-5 steps with a railing?: 3  Basic Mobility Total Score: 18     Patient left HOB elevated with all lines intact, call button in reach, and nurse notified..    GOALS:   Multidisciplinary Problems       Physical Therapy Goals          Problem: Physical Therapy    Goal Priority Disciplines Outcome Interventions   Physical Therapy Goal     PT, PT/OT Progressing    Description: PT goals until 3/22/25    1. Pt supine to sit through sidelying with mod independent-not met  2. Pt sit to supine through sidelying with mod independent-not met  3. Pt sit to stand with RW or pt's own lofstrand crutches as needed for safety with supervision- met with RW 3/13/25  4. Pt to perform gait 150ft with RW or pt's own lofstrand crutches  with supervision.-not met  5. Pt to transfer bed to/from bedside chair with RW or lofstrand crutches with supervision.-not met                       Time Tracking:     PT Received On: 03/14/25  PT Start Time: 1315     PT Stop Time: 1340  PT Total Time (min): 25  min     Billable Minutes: Gait Training 15 and Therapeutic Activity 10    Treatment Type: Treatment  PT/PTA: PT           03/14/2025

## 2025-03-14 NOTE — ASSESSMENT & PLAN NOTE
Fior Dc has flat back deformity and lumbar stenosis with neurogenic claudication. She is now s/p T11-pelvis fusion (3/11).     POD 3    Admitted to neurosurgery, floor status  Q4 hour neurochecks  Continue HV drains and ANDREINA drains/Prevena per plastic surgery  PT/OT/OOB as tolerated  MM Pain control: Oxy 10, tylenol, valium, baclofen scheduled, lyrica, robxain/dilaudid/oxy 15 prn.   TLSO wear when OOB  Itching from allergy and adhesive: benadryl and atarax  XR TL Spine   Voiding spontaneously  MM pain control: increased pain meds  DVT ppx: LVX    D/w staff, Dr. Coe

## 2025-03-14 NOTE — PROGRESS NOTES
[facility] - Neurosurgery ([facility])  Neurosurgery  Progress Note    Subjective:     History of Present Illness: HPI from 10/24/2024:  Patient presents for follow-up of low back pain. S/p L4-5 MIS laminectomy with Dr. Walters 10/23/23. Previous decompression at L5-S1 at OSH. Progressively worsening since 2020. She has tried L5-S1 facet injections, PT, and massage therapy without relief. The pain is constant. Occasional neck stiffness. Denies arm pain, leg pain, b/b dysfunction, hand clumsiness. She walks flexed forward as she is unable to stand upright without pain. She reports intermittent foot numbness following surgeries in both feet in the early 2000s. She underwent cardiac stent placement 3/2024 and is on plavix now. She states her cardiologist won't clear her until 1 year post-op to undergo spine fusion. She also uses nicotine but says she will quit in order to have surgery. [facility]      Interval Hx:   Today the patient presents to clinic to discuss surgery. Denies changes in symptoms since last visit. Continues to report debilitating back pain. She cannot walk long distances or stand for long periods of time without severe pain. She's been participating in pre-hab since the last visit. She denies any bowel/bladder issues.        Post-Op Info:  Procedure(s) (LRB):  *AIRO* T10-PELVIS POSTERIOR INSTRUMENTED FUSION (N/A)  CLOSURE, SURGICAL WOUND OR DEHISCENCE, EXTENSIVE OR COMPLEX, SECONDARY   3 Days Post-Op   Interval History:   3/14: POD 3. XR obtained, deep drains with 60/160 output, superficial drain and prevena per PRS.  Walked 100 ft yesterday.  Voiding.  Pain better controlled.     Medications:  Continuous Infusions:  Scheduled Meds:   acetaminophen  1,000 mg Oral Q8H    ascorbic acid (vitamin C)  250 mg Oral Daily    atorvastatin  20 mg Oral Daily    baclofen  10 mg Oral TID    ceFAZolin (Ancef) IV (PEDS and ADULTS)  2 g Intravenous Q8H    diazePAM  5 mg Oral Q8H     enoxparin  40 mg Subcutaneous Q24H (prophylaxis, 1700)    Lactobacillus rhamnosus GG  1 capsule Oral Daily    lactulose  20 g Oral Daily    oxyCODONE  10 mg Oral Q8H    pantoprazole  20 mg Oral Daily    polyethylene glycol  17 g Oral Daily    pregabalin  150 mg Oral Daily    pregabalin  300 mg Oral QHS     PRN Meds:  Current Facility-Administered Medications:     diphenhydrAMINE, 25 mg, Intravenous, Q6H PRN    furosemide, 40 mg, Oral, Daily PRN    HYDROmorphone, 1 mg, Intravenous, Q3H PRN    hydrOXYzine HCL, 25 mg, Oral, TID PRN    methocarbamoL, 750 mg, Oral, TID PRN    nicotine, 1 patch, Transdermal, Daily PRN    oxyCODONE, 15 mg, Oral, Q4H PRN     Review of Systems  Objective:     Weight: 107.5 kg (236 lb 15.9 oz)  Body mass index is 36.03 kg/m².  Vital Signs (Most Recent):  Temp: 98.2 °F (36.8 °C) (03/14/25 0515)  Pulse: 79 (03/14/25 0515)  Resp: 17 (03/14/25 0643)  BP: 121/72 (03/14/25 0515)  SpO2: 95 % (03/14/25 0515) Vital Signs (24h Range):  Temp:  [97.8 °F (36.6 °C)-98.4 °F (36.9 °C)] 98.2 °F (36.8 °C)  Pulse:  [66-83] 79  Resp:  [16-18] 17  SpO2:  [92 %-98 %] 95 %  BP: ()/(55-77) 121/72                              Closed/Suction Drain 03/11/25 1230 Tube - 1 Right;Inferior Back Bulb 15 Fr. (Active)   Site Description Healing 03/13/25 1913   Dressing Type Transparent (Tegaderm) 03/13/25 1913   Dressing Status Clean;Dry;Intact 03/13/25 1913   Dressing Intervention Integrity maintained 03/13/25 1913   Drainage Sanguineous 03/13/25 1913   Status To bulb suction 03/13/25 1913   Output (mL) 55 mL 03/14/25 0459            Closed/Suction Drain 03/11/25 1230 Tube - 2 Left;Inferior Back Bulb 15 Fr. (Active)   Site Description Healing 03/13/25 1913   Dressing Type Transparent (Tegaderm) 03/13/25 1913   Dressing Status Clean;Dry;Intact 03/13/25 1913   Dressing Intervention Integrity maintained 03/13/25 1913   Drainage Sanguineous 03/13/25 1913   Status To bulb suction 03/13/25 1913   Output (mL) 23 mL  03/14/25 0459            Closed/Suction Drain 03/11/25 1307 Tube - 3 Right Back Accordion 10 Fr. (Active)   Site Description Healing 03/13/25 1913   Dressing Type Transparent (Tegaderm) 03/13/25 1913   Dressing Status Clean;Dry;Intact 03/13/25 1913   Dressing Intervention Integrity maintained 03/13/25 1913   Drainage Sanguineous 03/13/25 1913   Status To bulb suction 03/13/25 1913   Output (mL) 5 mL 03/14/25 0459            Closed/Suction Drain 03/11/25 1307 Tube - 2 Left Back Accordion 10 Fr. (Active)   Site Description Healing 03/13/25 1913   Dressing Type Transparent (Tegaderm) 03/13/25 1913   Dressing Status Dry;Intact;Clean 03/13/25 1913   Dressing Intervention Integrity maintained 03/13/25 1913   Drainage Sanguineous 03/13/25 1913   Status To bulb suction 03/13/25 1913   Output (mL) 10 mL 03/14/25 0459       Female External Urinary Catheter w/ Suction 03/13/25 2315 (Active)   Skin no redness;no breakdown;perineum cleansed w/ soap and water;female external urine collection device repositioned 03/13/25 2315   Tolerance no signs/symptoms of discomfort;requires minimal reinforced;did not assist with appliance change 03/13/25 2315   Suction Continuous suction at 70 mmHg 03/13/25 2315          Physical Exam         Neurosurgery Physical Exam    Gen: well nourished, normocephalic, atraumatic  CV: skin warm and dry, distal pulses present  Pulm: chest rise symmetric, no increased work of breathing  GI: abdomen soft, non-distended, non-tender  : patient voiding independently  MSK: no bony abnormalities noted  Psych: patient interacting with appropriate mood and affect     5/5 strength BLE  Sensation present to light touch in BLE  CN intact  \      Significant Labs:  Recent Labs   Lab 03/13/25  0526 03/13/25 1922 03/14/25  0236   * 113* 90   * 133* 134*   K 3.7 3.8 3.9    102 103   CO2 22* 24 26   BUN 14 13 12   CREATININE 0.8 0.7 0.7   CALCIUM 8.2* 8.4* 8.1*   MG 1.9 2.0 2.0     Recent Labs   Lab  03/13/25  0526 03/13/25 1922 03/14/25  0236   WBC 29.03* 29.42* 26.11*   HGB 8.6* 8.3* 7.9*   HCT 26.8* 25.2* 24.9*   * 148* 133*     Recent Labs   Lab 03/13/25 1922   INR 1.0   APTT 25.4     Microbiology Results (last 7 days)       ** No results found for the last 168 hours. **          All pertinent labs from the last 24 hours have been reviewed.    Significant Diagnostics:  I have reviewed all pertinent imaging results/findings within the past 24 hours.  Assessment/Plan:     * Spinal stenosis of lumbar region without neurogenic claudication  Fior Dc has flat back deformity and lumbar stenosis with neurogenic claudication. She is now s/p T11-pelvis fusion (3/11).     POD 3    Admitted to neurosurgery, floor status  Q4 hour neurochecks  Continue HV drains and ANDREINA drains/Prevena per plastic surgery  PT/OT/OOB as tolerated  MM Pain control: Oxy 10, tylenol, valium, baclofen scheduled, lyrica, robxain/dilaudid/oxy 15 prn.   TLSO wear when OOB  Itching from allergy and adhesive: benadryl and atarax  XR TL Spine   Voiding spontaneously  MM pain control: increased pain meds  DVT ppx: LVX  Anticipate eventual discharge with home health after drains removed.     D/w staff, Dr. Saravanan Collins MD  Neurosurgery  [facility] - Neurosurgery ([facility])

## 2025-03-14 NOTE — CARE UPDATE
Patient became more drowsy and difficult to arouse this afternoon, O2 sats in the 80s. Given 1 x dose narcan with improvement in alertness, O2 sats now 99%. C/o severe back pain, explained to the patient we will have to limit opioid use. H/H trending down and patient hypotensive 80s/50s. Receiving 1u RBC currently. High outputs from drains. CT abd/pelvis ordered for evaluation of hemorrhage. Repeat CBC once transfusion completed. Please call NSGY on call with acute changes in exam. Low threshold to transfer to [facility] for close monitoring. Discussed with Dr. Coe.

## 2025-03-15 PROBLEM — E87.1 HYPONATREMIA: Status: ACTIVE | Noted: 2025-03-15

## 2025-03-15 PROBLEM — K59.00 CONSTIPATION: Status: ACTIVE | Noted: 2025-03-15

## 2025-03-15 LAB
ANION GAP SERPL CALC-SCNC: 6 MMOL/L (ref 8–16)
BASOPHILS # BLD AUTO: 0.07 K/UL (ref 0–0.2)
BASOPHILS NFR BLD: 0.3 % (ref 0–1.9)
BUN SERPL-MCNC: 11 MG/DL (ref 8–23)
CALCIUM SERPL-MCNC: 8 MG/DL (ref 8.7–10.5)
CHLORIDE SERPL-SCNC: 102 MMOL/L (ref 95–110)
CO2 SERPL-SCNC: 25 MMOL/L (ref 23–29)
CREAT SERPL-MCNC: 0.6 MG/DL (ref 0.5–1.4)
DIFFERENTIAL METHOD BLD: ABNORMAL
EOSINOPHIL # BLD AUTO: 0.2 K/UL (ref 0–0.5)
EOSINOPHIL NFR BLD: 1 % (ref 0–8)
ERYTHROCYTE [DISTWIDTH] IN BLOOD BY AUTOMATED COUNT: 16.1 % (ref 11.5–14.5)
EST. GFR  (NO RACE VARIABLE): >60 ML/MIN/1.73 M^2
GLUCOSE SERPL-MCNC: 91 MG/DL (ref 70–110)
HCT VFR BLD AUTO: 24.3 % (ref 37–48.5)
HGB BLD-MCNC: 7.8 G/DL (ref 12–16)
IMM GRANULOCYTES # BLD AUTO: 0.07 K/UL (ref 0–0.04)
IMM GRANULOCYTES NFR BLD AUTO: 0.3 % (ref 0–0.5)
LYMPHOCYTES # BLD AUTO: 15.3 K/UL (ref 1–4.8)
LYMPHOCYTES NFR BLD: 69 % (ref 18–48)
MAGNESIUM SERPL-MCNC: 1.9 MG/DL (ref 1.6–2.6)
MCH RBC QN AUTO: 29.7 PG (ref 27–31)
MCHC RBC AUTO-ENTMCNC: 32.1 G/DL (ref 32–36)
MCV RBC AUTO: 92 FL (ref 82–98)
MONOCYTES # BLD AUTO: 1 K/UL (ref 0.3–1)
MONOCYTES NFR BLD: 4.4 % (ref 4–15)
NEUTROPHILS # BLD AUTO: 5.5 K/UL (ref 1.8–7.7)
NEUTROPHILS NFR BLD: 25 % (ref 38–73)
NRBC BLD-RTO: 0 /100 WBC
PHOSPHATE SERPL-MCNC: 2.9 MG/DL (ref 2.7–4.5)
PLATELET # BLD AUTO: 156 K/UL (ref 150–450)
PLATELET BLD QL SMEAR: ABNORMAL
PMV BLD AUTO: 11.2 FL (ref 9.2–12.9)
POTASSIUM SERPL-SCNC: 3.8 MMOL/L (ref 3.5–5.1)
RBC # BLD AUTO: 2.63 M/UL (ref 4–5.4)
SMUDGE CELLS BLD QL SMEAR: PRESENT
SODIUM SERPL-SCNC: 133 MMOL/L (ref 136–145)
WBC # BLD AUTO: 22.16 K/UL (ref 3.9–12.7)

## 2025-03-15 PROCEDURE — 25000003 PHARM REV CODE 250: Performed by: NURSE PRACTITIONER

## 2025-03-15 PROCEDURE — 11000001 HC ACUTE MED/SURG PRIVATE ROOM

## 2025-03-15 PROCEDURE — 84100 ASSAY OF PHOSPHORUS: CPT | Performed by: NURSE PRACTITIONER

## 2025-03-15 PROCEDURE — 25000003 PHARM REV CODE 250: Performed by: PHYSICIAN ASSISTANT

## 2025-03-15 PROCEDURE — 85025 COMPLETE CBC W/AUTO DIFF WBC: CPT | Performed by: STUDENT IN AN ORGANIZED HEALTH CARE EDUCATION/TRAINING PROGRAM

## 2025-03-15 PROCEDURE — 80048 BASIC METABOLIC PNL TOTAL CA: CPT | Performed by: STUDENT IN AN ORGANIZED HEALTH CARE EDUCATION/TRAINING PROGRAM

## 2025-03-15 PROCEDURE — 25000003 PHARM REV CODE 250

## 2025-03-15 PROCEDURE — 63600175 PHARM REV CODE 636 W HCPCS: Performed by: NURSE PRACTITIONER

## 2025-03-15 PROCEDURE — 83735 ASSAY OF MAGNESIUM: CPT | Performed by: NURSE PRACTITIONER

## 2025-03-15 PROCEDURE — 94761 N-INVAS EAR/PLS OXIMETRY MLT: CPT

## 2025-03-15 PROCEDURE — 63600175 PHARM REV CODE 636 W HCPCS

## 2025-03-15 PROCEDURE — 94799 UNLISTED PULMONARY SVC/PX: CPT

## 2025-03-15 PROCEDURE — 25000003 PHARM REV CODE 250: Performed by: STUDENT IN AN ORGANIZED HEALTH CARE EDUCATION/TRAINING PROGRAM

## 2025-03-15 RX ORDER — SENNOSIDES 8.6 MG/1
8.6 TABLET ORAL DAILY PRN
Status: DISCONTINUED | OUTPATIENT
Start: 2025-03-15 | End: 2025-03-17 | Stop reason: HOSPADM

## 2025-03-15 RX ORDER — SYRING-NEEDL,DISP,INSUL,0.3 ML 29 G X1/2"
296 SYRINGE, EMPTY DISPOSABLE MISCELLANEOUS ONCE
Status: DISCONTINUED | OUTPATIENT
Start: 2025-03-15 | End: 2025-03-15

## 2025-03-15 RX ORDER — SODIUM CHLORIDE 0.9 G/100ML
500 IRRIGANT IRRIGATION ONCE
Status: DISCONTINUED | OUTPATIENT
Start: 2025-03-15 | End: 2025-03-15

## 2025-03-15 RX ORDER — PSEUDOEPHEDRINE/ACETAMINOPHEN 30MG-500MG
100 TABLET ORAL ONCE
Status: DISCONTINUED | OUTPATIENT
Start: 2025-03-15 | End: 2025-03-15

## 2025-03-15 RX ORDER — BISACODYL 10 MG/1
10 SUPPOSITORY RECTAL DAILY PRN
Status: DISCONTINUED | OUTPATIENT
Start: 2025-03-15 | End: 2025-03-17 | Stop reason: HOSPADM

## 2025-03-15 RX ADMIN — PREGABALIN 300 MG: 150 CAPSULE ORAL at 09:03

## 2025-03-15 RX ADMIN — ATORVASTATIN CALCIUM 20 MG: 20 TABLET, FILM COATED ORAL at 09:03

## 2025-03-15 RX ADMIN — LACTULOSE 20 G: 20 SOLUTION ORAL at 09:03

## 2025-03-15 RX ADMIN — CEFAZOLIN 2 G: 2 INJECTION, POWDER, FOR SOLUTION INTRAMUSCULAR; INTRAVENOUS at 09:03

## 2025-03-15 RX ADMIN — ACETAMINOPHEN 1000 MG: 500 TABLET ORAL at 09:03

## 2025-03-15 RX ADMIN — Medication 324 MG: at 09:03

## 2025-03-15 RX ADMIN — OXYCODONE HYDROCHLORIDE 10 MG: 10 TABLET ORAL at 06:03

## 2025-03-15 RX ADMIN — ASPIRIN 81 MG: 81 TABLET, COATED ORAL at 09:03

## 2025-03-15 RX ADMIN — BACLOFEN 10 MG: 10 TABLET ORAL at 09:03

## 2025-03-15 RX ADMIN — POLYETHYLENE GLYCOL 3350 17 G: 17 POWDER, FOR SOLUTION ORAL at 08:03

## 2025-03-15 RX ADMIN — OXYCODONE 5 MG: 5 TABLET ORAL at 05:03

## 2025-03-15 RX ADMIN — Medication 250 MG: at 09:03

## 2025-03-15 RX ADMIN — ACETAMINOPHEN 1000 MG: 500 TABLET ORAL at 05:03

## 2025-03-15 RX ADMIN — ACETAMINOPHEN 1000 MG: 500 TABLET ORAL at 01:03

## 2025-03-15 RX ADMIN — CEFAZOLIN 2 G: 2 INJECTION, POWDER, FOR SOLUTION INTRAMUSCULAR; INTRAVENOUS at 05:03

## 2025-03-15 RX ADMIN — BACLOFEN 10 MG: 10 TABLET ORAL at 05:03

## 2025-03-15 RX ADMIN — OXYCODONE HYDROCHLORIDE 10 MG: 10 TABLET ORAL at 01:03

## 2025-03-15 RX ADMIN — OXYCODONE HYDROCHLORIDE 10 MG: 10 TABLET ORAL at 09:03

## 2025-03-15 RX ADMIN — Medication 1 CAPSULE: at 09:03

## 2025-03-15 RX ADMIN — Medication 1 ENEMA: at 12:03

## 2025-03-15 RX ADMIN — PREGABALIN 150 MG: 150 CAPSULE ORAL at 09:03

## 2025-03-15 RX ADMIN — ENOXAPARIN SODIUM 40 MG: 40 INJECTION SUBCUTANEOUS at 05:03

## 2025-03-15 RX ADMIN — CEFAZOLIN 2 G: 2 INJECTION, POWDER, FOR SOLUTION INTRAMUSCULAR; INTRAVENOUS at 11:03

## 2025-03-15 RX ADMIN — OXYCODONE HYDROCHLORIDE 10 MG: 10 TABLET ORAL at 02:03

## 2025-03-15 RX ADMIN — PANTOPRAZOLE SODIUM 20 MG: 20 TABLET, DELAYED RELEASE ORAL at 09:03

## 2025-03-15 RX ADMIN — METOPROLOL SUCCINATE 12.5 MG: 25 TABLET, EXTENDED RELEASE ORAL at 09:03

## 2025-03-15 NOTE — PLAN OF CARE
Problem: Adult Inpatient Plan of Care  Goal: Plan of Care Review  Outcome: Progressing  Goal: Patient-Specific Goal (Individualized)  Outcome: Progressing  Goal: Absence of Hospital-Acquired Illness or Injury  Outcome: Progressing  Goal: Optimal Comfort and Wellbeing  Outcome: Progressing  Goal: Readiness for Transition of Care  Outcome: Progressing     Problem: Infection  Goal: Absence of Infection Signs and Symptoms  Outcome: Progressing     Problem: Wound  Goal: Optimal Coping  Outcome: Progressing  Goal: Optimal Functional Ability  Outcome: Progressing  Goal: Absence of Infection Signs and Symptoms  Outcome: Progressing  Goal: Improved Oral Intake  Outcome: Progressing  Goal: Optimal Pain Control and Function  Outcome: Progressing  Goal: Skin Health and Integrity  Outcome: Progressing  Goal: Optimal Wound Healing  Outcome: Progressing     Problem: Skin Injury Risk Increased  Goal: Skin Health and Integrity  Outcome: Progressing     Problem: Fall Injury Risk  Goal: Absence of Fall and Fall-Related Injury  Outcome: Progressing     Problem: Pain Acute  Goal: Optimal Pain Control and Function  Outcome: Progressing

## 2025-03-15 NOTE — SUBJECTIVE & OBJECTIVE
Past Medical History:   Diagnosis Date    Arthritis     Bulging lumbar disc     Cancer     Osteoarthritis        Past Surgical History:   Procedure Laterality Date    ABDOMINOPLASTY      APPENDECTOMY      BACK SURGERY      breast aug Bilateral     CHOLECYSTECTOMY      CLOSURE, SURGICAL WOUND OR DEHISCENCE, EXTENSIVE OR COMPLEX, SECONDARY  3/11/2025    Procedure: CLOSURE, SURGICAL WOUND OR DEHISCENCE, EXTENSIVE OR COMPLEX, SECONDARY;  Surgeon: Brandon Chawla MD;  Location: [facility] OR [address] [place] [address];  Service: Plastics;;    FOOT SURGERY      GANGLION CYST EXCISION      GASTRIC BYPASS      HIP SURGERY      HYSTERECTOMY      KNEE SURGERY      LUMBAR FUSION N/A 3/11/2025    Procedure: *AIRO* T10-PELVIS POSTERIOR INSTRUMENTED FUSION;  Surgeon: Sharad Coe MD;  Location: [facility] OR [address] [place] [address];  Service: Neurosurgery;  Laterality: N/A;  T-10 TO PELVIS POSTERIOR INSTRUMENTED FUSION    LUMBAR LAMINECTOMY N/A 10/23/2023    Procedure: MINIMALLY INVASIVE LAMINECTOMY, SPINE, LUMBAR L4-5;  Surgeon: Adam Walters MD;  Location: [facility] OR [address] [place] [address];  Service: Neurosurgery;  Laterality: N/A;    SHOULDER SURGERY      TOTAL REDUCTION MAMMOPLASTY         Review of patient's allergies indicates:   Allergen Reactions    Adhesive tape-silicones      Tegaderm ok per Pt. Pt reports high allergy to paper tape as well    Codeine      Has to take benadryl with      Flunisolide      Unsure of reaction    Gabapentin Other (See Comments)     Goulding drowsy    Hydrocodone Itching     Can take with benadryl    Oxycodone Itching     Can take with benadryl        No current facility-administered medications on file prior to encounter.     Current Outpatient Medications on File Prior to Encounter   Medication Sig    acetaminophen (TYLENOL) 500 MG tablet Take 2 tablets (1,000 mg total) by mouth every 8 (eight) hours.    apple cider vinegar 250 mg Chew Take by mouth once daily.    ascorbic acid, vitamin C, (VITAMIN C) 250 MG tablet Take 250 mg by mouth once  daily.    aspirin (ECOTRIN) 81 MG EC tablet Take 81 mg by mouth once. On hold    atorvastatin 20 mg/5 mL (4 mg/mL) Susp Take 20 mg by mouth once daily. On hold until after surgery    baclofen (LIORESAL) 20 MG tablet 10 mg. TAKES 10 MG    carboxymethylcellulose (REFRESH PLUS) 0.5 % Dpet 1 drop 3 (three) times daily as needed.    carboxymethylcellulose (REFRESH PLUS) 0.5 % Dpet 1 drop 3 (three) times daily as needed.    clotrimazole (LOTRIMIN) 1 % cream APPLY MODERATE AMOUNT TOPICALLY TWICE A DAY FOR FUNGAL INFECTION    ergocalciferol, vitamin D2, 10 mcg (400 unit) Tab Take 400 Units by mouth once a week.    ferrous gluconate (FERGON) 324 MG tablet Take 324 mg by mouth daily with breakfast.    Lactobacillus rhamnosus GG (CULTURELLE) 10 billion cell capsule Take 1 capsule by mouth once daily.    levocetirizine (XYZAL) 5 MG tablet Take 5 mg by mouth every evening.    magnesium oxide (MAG-OX) 400 mg (241.3 mg magnesium) tablet Take 400 mg by mouth once daily.    metoprolol succinate (TOPROL-XL) 25 MG 24 hr tablet TAKE 12.5 MG (ONE-HALF TABLET) BY MOUTH EVERY DAY FOR CHRONIC HEART FAILURE    pantoprazole (PROTONIX) 20 MG tablet Take 20 mg by mouth once daily.    pregabalin (LYRICA) 150 MG capsule Take 150 mg by mouth 2 (two) times daily.    senna (SENOKOT) 8.6 mg tablet Take 8.6 mg by mouth.    spironolactone (ALDACTONE) 25 MG tablet Take 25 mg by mouth.    triamcinolone acetonide 0.1% (KENALOG) 0.1 % cream Apply topically 2 (two) times daily. prn    [Paused] valsartan (DIOVAN) 40 MG tablet 40 mg once daily.    zinc sulfate (ZINCATE) 50 mg zinc (220 mg) capsule Take 1 capsule by mouth once daily.    baclofen (LIORESAL) 10 MG tablet Take 10 mg by mouth 3 (three) times daily. (Patient not taking: Reported on 1/9/2025)    empagliflozin (JARDIANCE ORAL)     furosemide (LASIX) 40 MG tablet 20 mg as needed.     Family History       Problem Relation (Age of Onset)    Cancer Father    Diabetes Father    Hypertension Mother,  Father          Tobacco Use    Smoking status: Every Day     Current packs/day: 0.50     Average packs/day: 0.5 packs/day for 1.9 years (0.9 ttl pk-yrs)     Types: Vaping with nicotine, Cigarettes     Start date: 4/1/2023    Smokeless tobacco: Never   Substance and Sexual Activity    Alcohol use: Yes    Drug use: Never    Sexual activity: Not on file     Review of Systems   Constitutional:  Negative for appetite change.   Cardiovascular:  Negative for chest pain.   Gastrointestinal:  Positive for abdominal pain and constipation. Negative for nausea and vomiting.   Neurological:  Negative for dizziness, light-headedness and headaches.     Objective:     Vital Signs (Most Recent):  Temp: 99 °F (37.2 °C) (03/15/25 1614)  Pulse: 76 (03/15/25 1614)  Resp: 16 (03/15/25 1614)  BP: 112/65 (03/15/25 1614)  SpO2: 96 % (03/15/25 1614) Vital Signs (24h Range):  Temp:  [98 °F (36.7 °C)-99 °F (37.2 °C)] 99 °F (37.2 °C)  Pulse:  [66-81] 76  Resp:  [16-20] 16  SpO2:  [93 %-99 %] 96 %  BP: (100-137)/(59-80) 112/65     Weight: 107.5 kg (236 lb 15.9 oz)  Body mass index is 36.03 kg/m².     Physical Exam  Constitutional:       General: She is in acute distress.      Appearance: She is not ill-appearing or diaphoretic.   HENT:      Head: Normocephalic and atraumatic.      Right Ear: External ear normal.      Left Ear: External ear normal.      Nose: Nose normal.      Mouth/Throat:      Mouth: Mucous membranes are moist.   Eyes:      Extraocular Movements: Extraocular movements intact.   Cardiovascular:      Rate and Rhythm: Normal rate and regular rhythm.      Heart sounds: No murmur heard.  Pulmonary:      Effort: Pulmonary effort is normal. No respiratory distress.      Breath sounds: No wheezing or rhonchi.   Abdominal:      General: Abdomen is flat. There is no distension.      Tenderness: There is abdominal tenderness.   Musculoskeletal:         General: Normal range of motion.      Cervical back: Normal range of motion.      Right  lower leg: No edema.      Left lower leg: No edema.   Skin:     General: Skin is warm.      Capillary Refill: Capillary refill takes less than 2 seconds.   Neurological:      General: No focal deficit present.      Mental Status: She is alert.          Significant Labs: All pertinent labs within the past 24 hours have been reviewed.    Significant Imaging: I have reviewed all pertinent imaging results/findings within the past 24 hours.

## 2025-03-15 NOTE — ASSESSMENT & PLAN NOTE
S/p elective T11-pelvis posterior fusion, L4-S1 TLIF and L1-L4 SPO for deformity correction and bilateral paraspinal muscle flap closure for with NSGY and plastic surgery 3/11/25. POD 4. Reports pain well controlled currently     - continue management per NSGY primary team  - Q4 hour neurochecks  - MM Pain control: Oxy 5/10 prn, tylenol 1g, baclofen scheduled, lyrica 150 daily 300 nightly.   - TLSO brace

## 2025-03-15 NOTE — PLAN OF CARE
Problem: Adult Inpatient Plan of Care  Goal: Plan of Care Review  Outcome: Progressing  Goal: Patient-Specific Goal (Individualized)  Outcome: Progressing  Goal: Optimal Comfort and Wellbeing  Outcome: Progressing  Goal: Readiness for Transition of Care  Outcome: Progressing     Problem: Adult Inpatient Plan of Care  Goal: Plan of Care Review  Outcome: Progressing     Problem: Adult Inpatient Plan of Care  Goal: Patient-Specific Goal (Individualized)  Outcome: Progressing     Problem: Adult Inpatient Plan of Care  Goal: Optimal Comfort and Wellbeing  Outcome: Progressing     Problem: Adult Inpatient Plan of Care  Goal: Readiness for Transition of Care  Outcome: Progressing     Problem: Wound  Goal: Optimal Coping  Outcome: Progressing  Goal: Optimal Functional Ability  Outcome: Progressing  Goal: Optimal Pain Control and Function  Outcome: Progressing     Problem: Wound  Goal: Optimal Coping  Outcome: Progressing     Problem: Wound  Goal: Optimal Functional Ability  Outcome: Progressing     Problem: Wound  Goal: Optimal Pain Control and Function  Outcome: Progressing       POC reviewed with the patient at the bedside. Verbalization of understanding voiced. Questions and concerns addressed. Precautions maintained. No events noted at present during this shift.  Cardiac monitoring ongoing. Vital signs all shift. See flow sheet. Patient requires around the clock dosing with PRN Oxycodone for adequate pain control. ANDREINA drains x 2, Hemovac drain x 1, and wound vac , and abdominal binder remain in place. Patient is able to ambulate with LSO brace on and  walker with one person assist to the bathroom without difficulty. Upon exiting room bed low and locked with call light within reach and bed alarm activated. POC ongoing.

## 2025-03-15 NOTE — PROGRESS NOTES
[facility] - Neurosurgery ([facility])  Neurosurgery  Progress Note    Subjective:     History of Present Illness: HPI from 10/24/2024:  Patient presents for follow-up of low back pain. S/p L4-5 MIS laminectomy with Dr. Walters 10/23/23. Previous decompression at L5-S1 at OSH. Progressively worsening since 2020. She has tried L5-S1 facet injections, PT, and massage therapy without relief. The pain is constant. Occasional neck stiffness. Denies arm pain, leg pain, b/b dysfunction, hand clumsiness. She walks flexed forward as she is unable to stand upright without pain. She reports intermittent foot numbness following surgeries in both feet in the early 2000s. She underwent cardiac stent placement 3/2024 and is on plavix now. She states her cardiologist won't clear her until 1 year post-op to undergo spine fusion. She also uses nicotine but says she will quit in order to have surgery. [facility]      Interval Hx:   Today the patient presents to clinic to discuss surgery. Denies changes in symptoms since last visit. Continues to report debilitating back pain. She cannot walk long distances or stand for long periods of time without severe pain. She's been participating in pre-hab since the last visit. She denies any bowel/bladder issues.        Post-Op Info:  Procedure(s) (LRB):  *AIRO* T10-PELVIS POSTERIOR INSTRUMENTED FUSION (N/A)  CLOSURE, SURGICAL WOUND OR DEHISCENCE, EXTENSIVE OR COMPLEX, SECONDARY   4 Days Post-Op   Interval History: 3/15: POD 4. Neuro exam stable. Had 2 episodes of hypotension yesterday afternoon, Hgb 7.5, got 1U of blood, got CTA AP showed no active bleeding / hematoma, put patient on pulse ox and tele for monitoring. Repeat Hgb after transfusion was 8.5. Satting well on 2L NC due to dropping sat when sleep per nurse. BP has been stable. Drains in place with output: ANDREINA: 340cc (L), 450cc (R) and HV: 0cc (L), 10cc (R).     Medications:  Continuous  Infusions:  Scheduled Meds:   acetaminophen  1,000 mg Oral Q8H    ascorbic acid (vitamin C)  250 mg Oral Daily    aspirin  81 mg Oral Daily    atorvastatin  20 mg Oral Daily    baclofen  10 mg Oral TID    ceFAZolin (Ancef) IV (PEDS and ADULTS)  2 g Intravenous Q8H    enoxparin  40 mg Subcutaneous Q24H (prophylaxis, 1700)    ferrous gluconate  324 mg Oral Daily with breakfast    Lactobacillus rhamnosus GG  1 capsule Oral Daily    lactulose  20 g Oral Daily    metoprolol succinate  12.5 mg Oral Daily    pantoprazole  20 mg Oral Daily    polyethylene glycol  17 g Oral Daily    pregabalin  150 mg Oral Daily    pregabalin  300 mg Oral QHS     PRN Meds:  Current Facility-Administered Medications:     0.9%  NaCl infusion (for blood administration), , Intravenous, Q24H PRN    albuterol-ipratropium, 3 mL, Nebulization, Q4H PRN    diphenhydrAMINE, 25 mg, Intravenous, Q6H PRN    naloxone, 0.4 mg, Intravenous, PRN    nicotine, 1 patch, Transdermal, Daily PRN    oxyCODONE, 5 mg, Oral, Q4H PRN    oxyCODONE, 10 mg, Oral, Q4H PRN     Review of Systems  Objective:     Weight: 107.5 kg (236 lb 15.9 oz)  Body mass index is 36.03 kg/m².  Vital Signs (Most Recent):  Temp: 98.2 °F (36.8 °C) (03/15/25 0739)  Pulse: 71 (03/15/25 0739)  Resp: 20 (03/15/25 0739)  BP: 123/72 (03/15/25 0739)  SpO2: 97 % (03/15/25 0739) Vital Signs (24h Range):  Temp:  [98 °F (36.7 °C)-98.5 °F (36.9 °C)] 98.2 °F (36.8 °C)  Pulse:  [71-81] 71  Resp:  [17-20] 20  SpO2:  [84 %-99 %] 97 %  BP: ()/(49-80) 123/72                              Closed/Suction Drain 03/11/25 1230 Tube - 1 Right;Inferior Back Bulb 15 Fr. (Active)   Site Description Healing 03/15/25 0600   Dressing Type Transparent (Tegaderm) 03/15/25 0600   Dressing Status Clean;Dry;Intact 03/15/25 0600   Dressing Intervention Integrity maintained 03/15/25 0600   Drainage Serosanguineous 03/15/25 0600   Status To bulb suction 03/15/25 0600   Output (mL) 30 mL 03/15/25 0400            Closed/Suction  Drain 03/11/25 1230 Tube - 2 Left;Inferior Back Bulb 15 Fr. (Active)   Site Description Healing 03/15/25 0600   Dressing Type Transparent (Tegaderm) 03/15/25 0600   Dressing Status Clean;Dry;Intact 03/15/25 0600   Dressing Intervention Integrity maintained 03/15/25 0600   Drainage Serosanguineous 03/15/25 0600   Status To bulb suction 03/15/25 0600   Output (mL) 30 mL 03/15/25 0400            Closed/Suction Drain 03/11/25 1307 Tube - 3 Right Back Accordion 10 Fr. (Active)   Site Description Healing 03/15/25 0600   Dressing Type Transparent (Tegaderm) 03/15/25 0600   Dressing Status Clean;Dry;Intact 03/15/25 0600   Dressing Intervention Integrity maintained 03/15/25 0600   Drainage Serosanguineous 03/15/25 0600   Status Open to gravity drainage 03/15/25 0600   Output (mL) 0 mL 03/15/25 0400            Closed/Suction Drain 03/11/25 1307 Tube - 2 Left Back Accordion 10 Fr. (Active)   Site Description Healing 03/13/25 1913   Dressing Type Transparent (Tegaderm) 03/13/25 1913   Dressing Status Dry;Intact;Clean 03/13/25 1913   Dressing Intervention Integrity maintained 03/13/25 1913   Drainage Sanguineous 03/13/25 1913   Status To bulb suction 03/13/25 1913   Output (mL) 0 mL 03/14/25 1030       Female External Urinary Catheter w/ Suction 03/13/25 2315 (Active)   Skin no redness;no breakdown 03/15/25 0600   Tolerance no signs/symptoms of discomfort 03/15/25 0600   Suction Continuous suction at 60 mmHg 03/14/25 2000          Physical Exam         Neurosurgery Physical Exam    Gen: well nourished, normocephalic, atraumatic  CV: skin warm and dry, distal pulses present  Pulm: chest rise symmetric, no increased work of breathing  GI: abdomen soft, non-distended, non-tender  : patient voiding independently  MSK: no bony abnormalities noted  Psych: patient interacting with appropriate mood and affect     5/5 strength BLE  Sensation present to light touch in BLE  CN intact    Significant Labs:  Recent Labs   Lab 03/13/25 1922  03/14/25  0236 03/14/25  1841   * 90 147*   * 134* 132*   K 3.8 3.9 3.8    103 100   CO2 24 26 25   BUN 13 12 13   CREATININE 0.7 0.7 0.7   CALCIUM 8.4* 8.1* 8.2*   MG 2.0 2.0 1.9     Recent Labs   Lab 03/14/25  0236 03/14/25  1545 03/14/25  1841   WBC 26.11* 24.75* 25.86*  25.86*   HGB 7.9* 7.5* 8.5*  8.5*   HCT 24.9* 24.1* 26.5*  26.5*   * 143* 141*  141*     Recent Labs   Lab 03/13/25  1922 03/14/25  1841   INR 1.0 0.9   APTT 25.4 24.3     Microbiology Results (last 7 days)       ** No results found for the last 168 hours. **          All pertinent labs from the last 24 hours have been reviewed.    Significant Diagnostics:  I have reviewed all pertinent imaging results/findings within the past 24 hours.  Assessment/Plan:     * Spinal stenosis of lumbar region without neurogenic claudication  Fior Dc has flat back deformity and lumbar stenosis with neurogenic claudication. She is now s/p T11-pelvis fusion (3/11).     POD 4    Admitted to neurosurgery, floor status  Q4 hour neurochecks  Continue HV drains and ANDREINA drains/Prevena per plastic surgery  PT/OT/OOB as tolerated  MM Pain control: Oxy 5/10 prn, tylenol 1g, baclofen scheduled, lyrica 150 daily 300 nightly.   TLSO wear when OOB  Itching from allergy and adhesive: benadryl and atarax  XR TL Spine   Voiding spontaneously  MM pain control: increased pain meds  DVT ppx: LVX    D/w staff, Dr. Coe and on call staff Dr. Marisel Castro MD  Neurosurgery  [facility] - Neurosurgery ([facility])

## 2025-03-15 NOTE — ASSESSMENT & PLAN NOTE
Reports hx of chronic constipation. Says that at home she takes lactulose,senna, and probiotic. Reports abdominal pain d/t constipation.     - Gave Fleet enema today  - prn bisacodyl suppository   - prn senna   - continue lactulose  - continue miralax

## 2025-03-15 NOTE — CONSULTS
[facility] - Neurosurgery ([facility])  [facility]  Consult Note    Patient Name: Fior Mckeon  MRN: [id number]  Admission Date: 3/11/2025  Hospital Length of Stay: 4 days  Attending Physician: Sharad Coe MD   Primary Care Provider: Matias Downs MD           Patient information was obtained from patient, past medical records, and ER records.     Inpatient consult to Hospital Medicine-General  Consult performed by: Ellen Caballero MD  Consult ordered by: Bhumi Reyes MD  Reason for consult: hyponatremia        Subjective:     Principal Problem: Spinal stenosis of lumbar region without neurogenic claudication    Chief Complaint: No chief complaint on file.       HPI: Fior Mckeon is a 64 y.o. female PMH gastric bypass, CHF, CAD s/p stents x 3, has flat back deformity and lumbar stenosis with neurogenic claudication. She is now s/p T11-pelvis fusion (3/11). Hospital medicine consulted for assistance with new hyponatremia.     Back Hx: Has flat back deformity with lumbar stenosis and neurogenic claudication. Failed conservative management. S/p L4-5 MIS laminectomy with Dr. Walters 10/23/23. Previous decompression at L5-S1 at OSH. Progressively worsening since 2020. She tried L5-S1 facet injections PT, and massage therapy without relief. Now POD 4 from T11-pelvis fusion.     CAD hx: patient report 3 prior stents and that [facility]. Per [facility] review: cardiac stents placed in March/April 2024 and was on Plavix. [facility]. After discussion with cardiologist she will remain on aspirin 81mg through surgery.  Also, had resultant ICM and HFreF after stents in 2024 during which she reportedly had EF ~ 30%. Recent echo 3/11 with EF 55- 60%, and normal diastolic function.     Patient reported severe abdominal pain this morning related to constipation. She said she had not had a BM in about 4 days. She says that she has been eating and  drinking well, about 4 large bottles a day. She denies any lightheaded, dizziness, HA, or N/V.     Past Medical History:   Diagnosis Date    Arthritis     Bulging lumbar disc     Cancer     Osteoarthritis        Past Surgical History:   Procedure Laterality Date    ABDOMINOPLASTY      APPENDECTOMY      BACK SURGERY      breast aug Bilateral     CHOLECYSTECTOMY      CLOSURE, SURGICAL WOUND OR DEHISCENCE, EXTENSIVE OR COMPLEX, SECONDARY  3/11/2025    Procedure: CLOSURE, SURGICAL WOUND OR DEHISCENCE, EXTENSIVE OR COMPLEX, SECONDARY;  Surgeon: Brandon Chawla MD;  Location: [facility] OR [address] [place] [address];  Service: Plastics;;    FOOT SURGERY      GANGLION CYST EXCISION      GASTRIC BYPASS      HIP SURGERY      HYSTERECTOMY      KNEE SURGERY      LUMBAR FUSION N/A 3/11/2025    Procedure: *AIRO* T10-PELVIS POSTERIOR INSTRUMENTED FUSION;  Surgeon: Sharad Coe MD;  Location: [facility] OR [address] [place] [address];  Service: Neurosurgery;  Laterality: N/A;  T-10 TO PELVIS POSTERIOR INSTRUMENTED FUSION    LUMBAR LAMINECTOMY N/A 10/23/2023    Procedure: MINIMALLY INVASIVE LAMINECTOMY, SPINE, LUMBAR L4-5;  Surgeon: Adam Walters MD;  Location: [facility] OR [address] [place] [address];  Service: Neurosurgery;  Laterality: N/A;    SHOULDER SURGERY      TOTAL REDUCTION MAMMOPLASTY         Review of patient's allergies indicates:   Allergen Reactions    Adhesive tape-silicones      Tegaderm ok per Pt. Pt reports high allergy to paper tape as well    Codeine      Has to take benadryl with      Flunisolide      Unsure of reaction    Gabapentin Other (See Comments)     [place]ac drowsy    Hydrocodone Itching     Can take with benadryl    Oxycodone Itching     Can take with benadryl        No current facility-administered medications on file prior to encounter.     Current Outpatient Medications on File Prior to Encounter   Medication Sig    acetaminophen (TYLENOL) 500 MG tablet Take 2 tablets (1,000 mg total) by mouth every 8 (eight) hours.    apple cider vinegar 250 mg Chew Take by  mouth once daily.    ascorbic acid, vitamin C, (VITAMIN C) 250 MG tablet Take 250 mg by mouth once daily.    aspirin (ECOTRIN) 81 MG EC tablet Take 81 mg by mouth once. On hold    atorvastatin 20 mg/5 mL (4 mg/mL) Susp Take 20 mg by mouth once daily. On hold until after surgery    baclofen (LIORESAL) 20 MG tablet 10 mg. TAKES 10 MG    carboxymethylcellulose (REFRESH PLUS) 0.5 % Dpet 1 drop 3 (three) times daily as needed.    carboxymethylcellulose (REFRESH PLUS) 0.5 % Dpet 1 drop 3 (three) times daily as needed.    clotrimazole (LOTRIMIN) 1 % cream APPLY MODERATE AMOUNT TOPICALLY TWICE A DAY FOR FUNGAL INFECTION    ergocalciferol, vitamin D2, 10 mcg (400 unit) Tab Take 400 Units by mouth once a week.    ferrous gluconate (FERGON) 324 MG tablet Take 324 mg by mouth daily with breakfast.    Lactobacillus rhamnosus GG (CULTURELLE) 10 billion cell capsule Take 1 capsule by mouth once daily.    levocetirizine (XYZAL) 5 MG tablet Take 5 mg by mouth every evening.    magnesium oxide (MAG-OX) 400 mg (241.3 mg magnesium) tablet Take 400 mg by mouth once daily.    metoprolol succinate (TOPROL-XL) 25 MG 24 hr tablet TAKE 12.5 MG (ONE-HALF TABLET) BY MOUTH EVERY DAY FOR CHRONIC HEART FAILURE    pantoprazole (PROTONIX) 20 MG tablet Take 20 mg by mouth once daily.    pregabalin (LYRICA) 150 MG capsule Take 150 mg by mouth 2 (two) times daily.    senna (SENOKOT) 8.6 mg tablet Take 8.6 mg by mouth.    spironolactone (ALDACTONE) 25 MG tablet Take 25 mg by mouth.    triamcinolone acetonide 0.1% (KENALOG) 0.1 % cream Apply topically 2 (two) times daily. prn    [Paused] valsartan (DIOVAN) 40 MG tablet 40 mg once daily.    zinc sulfate (ZINCATE) 50 mg zinc (220 mg) capsule Take 1 capsule by mouth once daily.    baclofen (LIORESAL) 10 MG tablet Take 10 mg by mouth 3 (three) times daily. (Patient not taking: Reported on 1/9/2025)    empagliflozin (JARDIANCE ORAL)     furosemide (LASIX) 40 MG tablet 20 mg as needed.     Family History        Problem Relation (Age of Onset)    Cancer Father    Diabetes Father    Hypertension Mother, Father          Tobacco Use    Smoking status: Every Day     Current packs/day: 0.50     Average packs/day: 0.5 packs/day for 1.9 years (0.9 ttl pk-yrs)     Types: Vaping with nicotine, Cigarettes     Start date: 4/1/2023    Smokeless tobacco: Never   Substance and Sexual Activity    Alcohol use: Yes    Drug use: Never    Sexual activity: Not on file     Review of Systems   Constitutional:  Negative for appetite change.   Cardiovascular:  Negative for chest pain.   Gastrointestinal:  Positive for abdominal pain and constipation. Negative for nausea and vomiting.   Neurological:  Negative for dizziness, light-headedness and headaches.     Objective:     Vital Signs (Most Recent):  Temp: 99 °F (37.2 °C) (03/15/25 1614)  Pulse: 76 (03/15/25 1614)  Resp: 16 (03/15/25 1614)  BP: 112/65 (03/15/25 1614)  SpO2: 96 % (03/15/25 1614) Vital Signs (24h Range):  Temp:  [98 °F (36.7 °C)-99 °F (37.2 °C)] 99 °F (37.2 °C)  Pulse:  [66-81] 76  Resp:  [16-20] 16  SpO2:  [93 %-99 %] 96 %  BP: (100-137)/(59-80) 112/65     Weight: 107.5 kg (236 lb 15.9 oz)  Body mass index is 36.03 kg/m².     Physical Exam  Constitutional:       General: She is in acute distress.      Appearance: She is not ill-appearing or diaphoretic.   HENT:      Head: Normocephalic and atraumatic.      Right Ear: External ear normal.      Left Ear: External ear normal.      Nose: Nose normal.      Mouth/Throat:      Mouth: Mucous membranes are moist.   Eyes:      Extraocular Movements: Extraocular movements intact.   Cardiovascular:      Rate and Rhythm: Normal rate and regular rhythm.      Heart sounds: No murmur heard.  Pulmonary:      Effort: Pulmonary effort is normal. No respiratory distress.      Breath sounds: No wheezing or rhonchi.   Abdominal:      General: Abdomen is flat. There is no distension.      Tenderness: There is abdominal tenderness.    Musculoskeletal:         General: Normal range of motion.      Cervical back: Normal range of motion.      Right lower leg: No edema.      Left lower leg: No edema.   Skin:     General: Skin is warm.      Capillary Refill: Capillary refill takes less than 2 seconds.   Neurological:      General: No focal deficit present.      Mental Status: She is alert.          Significant Labs: All pertinent labs within the past 24 hours have been reviewed.    Significant Imaging: I have reviewed all pertinent imaging results/findings within the past 24 hours.  Assessment/Plan:     * Spinal stenosis of lumbar region without neurogenic claudication  S/p elective T11-pelvis posterior fusion, L4-S1 TLIF and L1-L4 SPO for deformity correction and bilateral paraspinal muscle flap closure for with NSGY and plastic surgery 3/11/25. POD 4. Reports pain well controlled currently     - continue management per NSGY primary team  - Q4 hour neurochecks  - MM Pain control: Oxy 5/10 prn, tylenol 1g, baclofen scheduled, lyrica 150 daily 300 nightly.   - TLSO brace       Hyponatremia  Hyponatremia is likely due to unclear etiology. The patient's most recent sodium results are listed below.  Recent Labs     03/14/25  0236 03/14/25  1841 03/15/25  0712   * 132* 133*     Plan  - Correct the sodium by 4-6mEq in 24 hours.   - Obtain the following studies: Could consider Makenna, Uosm, Serum osm studies if hyponatremia worsens.  - Monitor sodium Daily.   - Patient hyponatremia is stable  - Unclear etiology of hyponatremia, unlikely hypovolemic in setting of good fluid intake. Possibly hypervolemic d/t excess fluid intake or medication related.  - Will continue to monitor    Constipation  Reports hx of chronic constipation. Says that at home she takes lactulose,senna, and probiotic. Reports abdominal pain d/t constipation.     - Gave Fleet enema today  - prn bisacodyl suppository   - prn senna   - continue lactulose  - continue miralax        Postoperative hypotension  Resolved      History of alcohol abuse  Reported history during COVID. Stopped cold turkey in [id number] with resultant seizure. Reports only drinks only occasionally now and no recent alcohol use.     - monitor for signs/symptoms of alcohol withdrawal      HFrEF (heart failure with reduced ejection fraction)  History of CAD s/p 3 stents in 2024 with resultant ICM and HFrEF. Reports EF improved from 30% to 60%. Not overtly fluid overloaded on exam.  Echo 3/11/25 with EF 55- 60%, and normal diastolic function. Mild AV sclerosis and mild TV regurg.   GDMT originally held in setting of post- op hypotension     - Continue metoprolol 12.5mg daily   - Continue to hold jardiance, spironolactone and valsartan and restart as tolerated    Acute blood loss anemia  Anemia is likely due to acute blood loss which was from T11-pelvis fusion on 3/11 . Most recent hemoglobin and hematocrit are listed below.  Recent Labs     03/14/25  1545 03/14/25  1841 03/15/25  0712   HGB 7.5* 8.5*  8.5* 7.8*   HCT 24.1* 26.5*  26.5* 24.3*     Plan  - Monitor serial CBC: Daily  - Transfuse PRBC if patient becomes hemodynamically unstable, symptomatic or H/H drops below 7/21.  - Patient has received 4 units of PRBCs on 3/11 and 3/14  - Patient's anemia is currently stable  - Continue to monitor    Tobacco dependency  - continue nicotine patch    CLL (chronic lymphocytic leukemia)  Hx of CLL. Has consistently elevated leukocytosis     - Continue to monitor      VTE Risk Mitigation (From admission, onward)           Ordered     enoxaparin injection 40 mg  Every 24 hours         03/11/25 1352     IP VTE LOW RISK PATIENT  Once         03/11/25 0514                        Thank you for your consult. I will follow-up with patient. Please contact us if you have any additional questions.    Ellen Caballero MD  Department of Hospital Medicine   [facility] - Neurosurgery ([facility])

## 2025-03-15 NOTE — ASSESSMENT & PLAN NOTE
History of CAD s/p 3 stents in 2024 with resultant ICM and HFrEF. Reports EF improved from 30% to 60%. Not overtly fluid overloaded on exam.  Echo 3/11/25 with EF 55- 60%, and normal diastolic function. Mild AV sclerosis and mild TV regurg.   GDMT originally held in setting of post- op hypotension     - Continue metoprolol 12.5mg daily   - Continue to hold jardiance, spironolactone and valsartan and restart as tolerated

## 2025-03-15 NOTE — SUBJECTIVE & OBJECTIVE
Interval History: 3/15: POD 4. Neuro exam stable. Had 2 episodes of hypotension yesterday afternoon, Hgb 7.5, got 1U of blood, got CTA AP showed no active bleeding / hematoma, put patient on pulse ox and tele for monitoring. Repeat Hgb after transfusion was 8.5. Satting well on 2L NC due to dropping sat when sleep per nurse. BP has been stable. Drains in place with output: ANDREINA: 340cc (L), 450cc (R) and HV: 0cc (L), 10cc (R).     Medications:  Continuous Infusions:  Scheduled Meds:   acetaminophen  1,000 mg Oral Q8H    ascorbic acid (vitamin C)  250 mg Oral Daily    aspirin  81 mg Oral Daily    atorvastatin  20 mg Oral Daily    baclofen  10 mg Oral TID    ceFAZolin (Ancef) IV (PEDS and ADULTS)  2 g Intravenous Q8H    enoxparin  40 mg Subcutaneous Q24H (prophylaxis, 1700)    ferrous gluconate  324 mg Oral Daily with breakfast    Lactobacillus rhamnosus GG  1 capsule Oral Daily    lactulose  20 g Oral Daily    metoprolol succinate  12.5 mg Oral Daily    pantoprazole  20 mg Oral Daily    polyethylene glycol  17 g Oral Daily    pregabalin  150 mg Oral Daily    pregabalin  300 mg Oral QHS     PRN Meds:  Current Facility-Administered Medications:     0.9%  NaCl infusion (for blood administration), , Intravenous, Q24H PRN    albuterol-ipratropium, 3 mL, Nebulization, Q4H PRN    diphenhydrAMINE, 25 mg, Intravenous, Q6H PRN    naloxone, 0.4 mg, Intravenous, PRN    nicotine, 1 patch, Transdermal, Daily PRN    oxyCODONE, 5 mg, Oral, Q4H PRN    oxyCODONE, 10 mg, Oral, Q4H PRN     Review of Systems  Objective:     Weight: 107.5 kg (236 lb 15.9 oz)  Body mass index is 36.03 kg/m².  Vital Signs (Most Recent):  Temp: 98.2 °F (36.8 °C) (03/15/25 0739)  Pulse: 71 (03/15/25 0739)  Resp: 20 (03/15/25 0739)  BP: 123/72 (03/15/25 0739)  SpO2: 97 % (03/15/25 0739) Vital Signs (24h Range):  Temp:  [98 °F (36.7 °C)-98.5 °F (36.9 °C)] 98.2 °F (36.8 °C)  Pulse:  [71-81] 71  Resp:  [17-20] 20  SpO2:  [84 %-99 %] 97 %  BP: ()/(49-80) 123/72                               Closed/Suction Drain 03/11/25 1230 Tube - 1 Right;Inferior Back Bulb 15 Fr. (Active)   Site Description Healing 03/15/25 0600   Dressing Type Transparent (Tegaderm) 03/15/25 0600   Dressing Status Clean;Dry;Intact 03/15/25 0600   Dressing Intervention Integrity maintained 03/15/25 0600   Drainage Serosanguineous 03/15/25 0600   Status To bulb suction 03/15/25 0600   Output (mL) 30 mL 03/15/25 0400            Closed/Suction Drain 03/11/25 1230 Tube - 2 Left;Inferior Back Bulb 15 Fr. (Active)   Site Description Healing 03/15/25 0600   Dressing Type Transparent (Tegaderm) 03/15/25 0600   Dressing Status Clean;Dry;Intact 03/15/25 0600   Dressing Intervention Integrity maintained 03/15/25 0600   Drainage Serosanguineous 03/15/25 0600   Status To bulb suction 03/15/25 0600   Output (mL) 30 mL 03/15/25 0400            Closed/Suction Drain 03/11/25 1307 Tube - 3 Right Back Accordion 10 Fr. (Active)   Site Description Healing 03/15/25 0600   Dressing Type Transparent (Tegaderm) 03/15/25 0600   Dressing Status Clean;Dry;Intact 03/15/25 0600   Dressing Intervention Integrity maintained 03/15/25 0600   Drainage Serosanguineous 03/15/25 0600   Status Open to gravity drainage 03/15/25 0600   Output (mL) 0 mL 03/15/25 0400            Closed/Suction Drain 03/11/25 1307 Tube - 2 Left Back Accordion 10 Fr. (Active)   Site Description Healing 03/13/25 1913   Dressing Type Transparent (Tegaderm) 03/13/25 1913   Dressing Status Dry;Intact;Clean 03/13/25 1913   Dressing Intervention Integrity maintained 03/13/25 1913   Drainage Sanguineous 03/13/25 1913   Status To bulb suction 03/13/25 1913   Output (mL) 0 mL 03/14/25 1030       Female External Urinary Catheter w/ Suction 03/13/25 2315 (Active)   Skin no redness;no breakdown 03/15/25 0600   Tolerance no signs/symptoms of discomfort 03/15/25 0600   Suction Continuous suction at 60 mmHg 03/14/25 2000          Physical Exam         Neurosurgery Physical  Exam    Gen: well nourished, normocephalic, atraumatic  CV: skin warm and dry, distal pulses present  Pulm: chest rise symmetric, no increased work of breathing  GI: abdomen soft, non-distended, non-tender  : patient voiding independently  MSK: no bony abnormalities noted  Psych: patient interacting with appropriate mood and affect     5/5 strength BLE  Sensation present to light touch in BLE  CN intact    On 2L NC    Significant Labs:  Recent Labs   Lab 03/13/25 1922 03/14/25  0236 03/14/25  1841   * 90 147*   * 134* 132*   K 3.8 3.9 3.8    103 100   CO2 24 26 25   BUN 13 12 13   CREATININE 0.7 0.7 0.7   CALCIUM 8.4* 8.1* 8.2*   MG 2.0 2.0 1.9     Recent Labs   Lab 03/14/25  0236 03/14/25  1545 03/14/25  1841   WBC 26.11* 24.75* 25.86*  25.86*   HGB 7.9* 7.5* 8.5*  8.5*   HCT 24.9* 24.1* 26.5*  26.5*   * 143* 141*  141*     Recent Labs   Lab 03/13/25 1922 03/14/25  1841   INR 1.0 0.9   APTT 25.4 24.3     Microbiology Results (last 7 days)       ** No results found for the last 168 hours. **          All pertinent labs from the last 24 hours have been reviewed.    Significant Diagnostics:  I have reviewed all pertinent imaging results/findings within the past 24 hours.

## 2025-03-15 NOTE — ASSESSMENT & PLAN NOTE
Hyponatremia is likely due to unclear etiology. The patient's most recent sodium results are listed below.  Recent Labs     03/14/25  0236 03/14/25  1841 03/15/25  0712   * 132* 133*     Plan  - Correct the sodium by 4-6mEq in 24 hours.   - Obtain the following studies: Could consider Makenna, Uosm, Serum osm studies if hyponatremia worsens.  - Monitor sodium Daily.   - Patient hyponatremia is stable  - Unclear etiology of hyponatremia, unlikely hypovolemic in setting of good fluid intake. Possibly hypervolemic d/t excess fluid intake or medication related.  - Will continue to monitor

## 2025-03-15 NOTE — PROGRESS NOTES
[facility] - Neurosurgery ([facility])  Neurosurgery  Progress Note    Subjective:     History of Present Illness: HPI from 10/24/2024:  Patient presents for follow-up of low back pain. S/p L4-5 MIS laminectomy with Dr. Walters 10/23/23. Previous decompression at L5-S1 at OSH. Progressively worsening since 2020. She has tried L5-S1 facet injections, PT, and massage therapy without relief. The pain is constant. Occasional neck stiffness. Denies arm pain, leg pain, b/b dysfunction, hand clumsiness. She walks flexed forward as she is unable to stand upright without pain. She reports intermittent foot numbness following surgeries in both feet in the early 2000s. She underwent cardiac stent placement 3/2024 and is on plavix now. She states her cardiologist won't clear her until 1 year post-op to undergo spine fusion. She also uses nicotine but says she will quit in order to have surgery. [facility]      Interval Hx:   Today the patient presents to clinic to discuss surgery. Denies changes in symptoms since last visit. Continues to report debilitating back pain. She cannot walk long distances or stand for long periods of time without severe pain. She's been participating in pre-hab since the last visit. She denies any bowel/bladder issues.        Post-Op Info:  Procedure(s) (LRB):  *AIRO* T10-PELVIS POSTERIOR INSTRUMENTED FUSION (N/A)  CLOSURE, SURGICAL WOUND OR DEHISCENCE, EXTENSIVE OR COMPLEX, SECONDARY   4 Days Post-Op   Interval History: 3/15: POD 4. Neuro exam stable. Had 2 episodes of hypotension yesterday afternoon, Hgb 7.5, got 1U of blood, got CTA AP showed no active bleeding / hematoma, put patient on pulse ox and tele for monitoring. Repeat Hgb after transfusion was 8.5. Satting well on 2L NC due to dropping sat when sleep per nurse. BP has been stable. Drains in place with output: ANDREINA: 340cc (L), 450cc (R) and HV: 0cc (L), 10cc (R).     Medications:  Continuous  Infusions:  Scheduled Meds:   acetaminophen  1,000 mg Oral Q8H    ascorbic acid (vitamin C)  250 mg Oral Daily    aspirin  81 mg Oral Daily    atorvastatin  20 mg Oral Daily    baclofen  10 mg Oral TID    ceFAZolin (Ancef) IV (PEDS and ADULTS)  2 g Intravenous Q8H    enoxparin  40 mg Subcutaneous Q24H (prophylaxis, 1700)    ferrous gluconate  324 mg Oral Daily with breakfast    Lactobacillus rhamnosus GG  1 capsule Oral Daily    lactulose  20 g Oral Daily    metoprolol succinate  12.5 mg Oral Daily    pantoprazole  20 mg Oral Daily    polyethylene glycol  17 g Oral Daily    pregabalin  150 mg Oral Daily    pregabalin  300 mg Oral QHS     PRN Meds:  Current Facility-Administered Medications:     0.9%  NaCl infusion (for blood administration), , Intravenous, Q24H PRN    albuterol-ipratropium, 3 mL, Nebulization, Q4H PRN    diphenhydrAMINE, 25 mg, Intravenous, Q6H PRN    naloxone, 0.4 mg, Intravenous, PRN    nicotine, 1 patch, Transdermal, Daily PRN    oxyCODONE, 5 mg, Oral, Q4H PRN    oxyCODONE, 10 mg, Oral, Q4H PRN     Review of Systems  Objective:     Weight: 107.5 kg (236 lb 15.9 oz)  Body mass index is 36.03 kg/m².  Vital Signs (Most Recent):  Temp: 98.2 °F (36.8 °C) (03/15/25 0739)  Pulse: 71 (03/15/25 0739)  Resp: 20 (03/15/25 0739)  BP: 123/72 (03/15/25 0739)  SpO2: 97 % (03/15/25 0739) Vital Signs (24h Range):  Temp:  [98 °F (36.7 °C)-98.5 °F (36.9 °C)] 98.2 °F (36.8 °C)  Pulse:  [71-81] 71  Resp:  [17-20] 20  SpO2:  [84 %-99 %] 97 %  BP: ()/(49-80) 123/72                              Closed/Suction Drain 03/11/25 1230 Tube - 1 Right;Inferior Back Bulb 15 Fr. (Active)   Site Description Healing 03/15/25 0600   Dressing Type Transparent (Tegaderm) 03/15/25 0600   Dressing Status Clean;Dry;Intact 03/15/25 0600   Dressing Intervention Integrity maintained 03/15/25 0600   Drainage Serosanguineous 03/15/25 0600   Status To bulb suction 03/15/25 0600   Output (mL) 30 mL 03/15/25 0400            Closed/Suction  Drain 03/11/25 1230 Tube - 2 Left;Inferior Back Bulb 15 Fr. (Active)   Site Description Healing 03/15/25 0600   Dressing Type Transparent (Tegaderm) 03/15/25 0600   Dressing Status Clean;Dry;Intact 03/15/25 0600   Dressing Intervention Integrity maintained 03/15/25 0600   Drainage Serosanguineous 03/15/25 0600   Status To bulb suction 03/15/25 0600   Output (mL) 30 mL 03/15/25 0400            Closed/Suction Drain 03/11/25 1307 Tube - 3 Right Back Accordion 10 Fr. (Active)   Site Description Healing 03/15/25 0600   Dressing Type Transparent (Tegaderm) 03/15/25 0600   Dressing Status Clean;Dry;Intact 03/15/25 0600   Dressing Intervention Integrity maintained 03/15/25 0600   Drainage Serosanguineous 03/15/25 0600   Status Open to gravity drainage 03/15/25 0600   Output (mL) 0 mL 03/15/25 0400            Closed/Suction Drain 03/11/25 1307 Tube - 2 Left Back Accordion 10 Fr. (Active)   Site Description Healing 03/13/25 1913   Dressing Type Transparent (Tegaderm) 03/13/25 1913   Dressing Status Dry;Intact;Clean 03/13/25 1913   Dressing Intervention Integrity maintained 03/13/25 1913   Drainage Sanguineous 03/13/25 1913   Status To bulb suction 03/13/25 1913   Output (mL) 0 mL 03/14/25 1030       Female External Urinary Catheter w/ Suction 03/13/25 2315 (Active)   Skin no redness;no breakdown 03/15/25 0600   Tolerance no signs/symptoms of discomfort 03/15/25 0600   Suction Continuous suction at 60 mmHg 03/14/25 2000          Physical Exam         Neurosurgery Physical Exam    Gen: well nourished, normocephalic, atraumatic  CV: skin warm and dry, distal pulses present  Pulm: chest rise symmetric, no increased work of breathing  GI: abdomen soft, non-distended, non-tender  : patient voiding independently  MSK: no bony abnormalities noted  Psych: patient interacting with appropriate mood and affect     5/5 strength BLE  Sensation present to light touch in BLE  CN intact    On 2L NC    Significant Labs:  Recent Labs   Lab  03/13/25 1922 03/14/25  0236 03/14/25  1841   * 90 147*   * 134* 132*   K 3.8 3.9 3.8    103 100   CO2 24 26 25   BUN 13 12 13   CREATININE 0.7 0.7 0.7   CALCIUM 8.4* 8.1* 8.2*   MG 2.0 2.0 1.9     Recent Labs   Lab 03/14/25  0236 03/14/25  1545 03/14/25  1841   WBC 26.11* 24.75* 25.86*  25.86*   HGB 7.9* 7.5* 8.5*  8.5*   HCT 24.9* 24.1* 26.5*  26.5*   * 143* 141*  141*     Recent Labs   Lab 03/13/25 1922 03/14/25  1841   INR 1.0 0.9   APTT 25.4 24.3     Microbiology Results (last 7 days)       ** No results found for the last 168 hours. **          All pertinent labs from the last 24 hours have been reviewed.    Significant Diagnostics:  I have reviewed all pertinent imaging results/findings within the past 24 hours.  Assessment/Plan:     * Spinal stenosis of lumbar region without neurogenic claudication  Fior Dc has flat back deformity and lumbar stenosis with neurogenic claudication. She is now s/p T11-pelvis fusion (3/11).     POD 4    Admitted to neurosurgery, floor status  Q4 hour neurochecks  Continue HV drains and ANDREINA drains/Prevena per plastic surgery  PT/OT/OOB as tolerated  MM Pain control: Oxy 5/10 prn, tylenol 1g, baclofen scheduled, lyrica 150 daily 300 nightly.   Maintain HV drains  FU with plastic for management of ANDREINA drains  TLSO wear when OOB  Itching from allergy and adhesive: benadryl and atarax  XR TL Spine   Voiding spontaneously  MM pain control: increased pain meds  DVT ppx: LVX    D/w staff, Dr. Coe and on call staff Dr. Marisel Castro MD  Neurosurgery  [facility] - Neurosurgery ([facility])

## 2025-03-15 NOTE — ASSESSMENT & PLAN NOTE
Reported history during COVID. Stopped cold turkey in [id number] with resultant seizure. Reports only drinks only occasionally now and no recent alcohol use.     - monitor for signs/symptoms of alcohol withdrawal

## 2025-03-15 NOTE — HPI
Fior Mckeon is a 64 y.o. female PMH gastric bypass, CHF, CAD s/p stents x 3, has flat back deformity and lumbar stenosis with neurogenic claudication. She is now s/p T11-pelvis fusion (3/11). Hospital medicine consulted for assistance with new hyponatremia.     Back Hx: Has flat back deformity with lumbar stenosis and neurogenic claudication. Failed conservative management. S/p L4-5 MIS laminectomy with Dr. Walters 10/23/23. Previous decompression at L5-S1 at OSH. Progressively worsening since 2020. She tried L5-S1 facet injections PT, and massage therapy without relief. Now POD 4 from T11-pelvis fusion.     CAD hx: patient report 3 prior stents and that [facility]. Per chart review: cardiac stents placed in March/April 2024 and was on Plavix. Per [facility]. After discussion with cardiologist she will remain on aspirin 81mg through surgery.  Also, had resultant ICM and HFreF after stents in 2024 during which she reportedly had EF ~ 30%. Recent echo 3/11 with EF 55- 60%, and normal diastolic function.     Patient reported severe abdominal pain this morning related to constipation. She said she had not had a BM in about 4 days. She says that she has been eating and drinking well, about 4 large bottles a day. She denies any lightheaded, dizziness, HA, or N/V.

## 2025-03-15 NOTE — PROGRESS NOTES
Plastic and Reconstructive Surgery   Progress Note    Subjective:    Drains 450/340 last 24hrs. Accordian 10cc.  CT reviewed, mixed fluid collection deep to fascia  Suspect ANDREINA drains evacuating both spaces  Discussed with NSGY, may consider placing accordian back to suction.  Received transfusion yesterday current hb 7.8 from 8.5    Objective:  Vital signs in last 24 hours:  Temp:  [98 °F (36.7 °C)-98.5 °F (36.9 °C)] 98.2 °F (36.8 °C)  Pulse:  [66-81] 71  Resp:  [17-20] 20  SpO2:  [84 %-99 %] 97 %  BP: ()/(49-80) 123/72    Intake/Output last 3 shifts:  I/O last 3 completed shifts:  In: 678.3 [P.O.:450; Blood:228.3]  Out: 2362 [Urine:1150; Drains:1212]    Intake/Output this shift:  No intake/output data recorded.        Physical Exam:  VITAL SIGNS:   Vitals:    03/15/25 0316 03/15/25 0541 03/15/25 0659 03/15/25 0739   BP: 104/61   123/72   BP Location: Left arm      Patient Position: Lying      Pulse: 74  66 71   Resp: 20 18  20   Temp: 98 °F (36.7 °C)   98.2 °F (36.8 °C)   TempSrc: Oral   Oral   SpO2: 99%   97%   Weight:       Height:         TMAX: Temp (24hrs), Av.2 °F (36.8 °C), Min:98 °F (36.7 °C), Max:98.5 °F (36.9 °C)      General: Alert; No acute distress  Cardiovascular: Regular rate   Respiratory: Normal respiratory effort. Chest rise symmetric.   Abdomen: Soft, nontender, nondistended  Extremity: Moves all extremities equally.  Neurologic: No focal deficit. Speech normal  Midline incision w/ prevena and good seal  Drains ANDREINA x2 inferior back    Drains accordian are NSGY      Scheduled Medications acetaminophen, 1,000 mg, Q8H  ascorbic acid (vitamin C), 250 mg, Daily  aspirin, 81 mg, Daily  atorvastatin, 20 mg, Daily  baclofen, 10 mg, TID  ceFAZolin (Ancef) IV (PEDS and ADULTS), 2 g, Q8H  enoxparin, 40 mg, Q24H (prophylaxis, 1700)  ferrous gluconate, 324 mg, Daily with breakfast  Lactobacillus rhamnosus GG, 1 capsule, Daily  lactulose, 20 g, Daily  metoprolol succinate, 12.5 mg, Daily  pantoprazole,  20 mg, Daily  polyethylene glycol, 17 g, Daily  pregabalin, 150 mg, Daily  pregabalin, 300 mg, QHS        PRN Medications     Current Facility-Administered Medications:     0.9%  NaCl infusion (for blood administration), , Intravenous, Q24H PRN    albuterol-ipratropium, 3 mL, Nebulization, Q4H PRN    diphenhydrAMINE, 25 mg, Intravenous, Q6H PRN    naloxone, 0.4 mg, Intravenous, PRN    nicotine, 1 patch, Transdermal, Daily PRN    oxyCODONE, 5 mg, Oral, Q4H PRN    oxyCODONE, 10 mg, Oral, Q4H PRN    Recent Labs:   Lab Results   Component Value Date    WBC 22.16 (H) 03/15/2025    HGB 7.8 (L) 03/15/2025    HCT 24.3 (L) 03/15/2025    MCV 92 03/15/2025     03/15/2025     Lab Results   Component Value Date    GLU 91 03/15/2025     (L) 03/15/2025    K 3.8 03/15/2025     03/15/2025    BUN 11 03/15/2025         Assessment:   64 y.o. y/o female s/p Procedure(s):  *AIRO* T10-PELVIS POSTERIOR INSTRUMENTED FUSION  CLOSURE, SURGICAL WOUND OR DEHISCENCE, EXTENSIVE OR COMPLEX, SECONDARY      4 Days Post-Op         Plan  - keep prevena on, plan for takedown tomorrow  - Drains: keep PRS ANDREINA drains x2 in place. Accordians per NSGY. Considering placing accordian to suction  - will continue to monitor drain output closely, consider clamping if no change after accordian placed to suction  - monitor h/h, transfuse as necessary  - HI will follow      Patient was discussed with Attending Plastic Surgeon, Dr. Denton Iyer MD- Fellow  Department of Plastic and Reconstructive Surgery

## 2025-03-15 NOTE — ASSESSMENT & PLAN NOTE
Fior Dc has flat back deformity and lumbar stenosis with neurogenic claudication. She is now s/p T11-pelvis fusion (3/11).     POD 4    Admitted to neurosurgery, floor status  Q4 hour neurochecks  Continue HV drains and ANDREINA drains/Prevena per plastic surgery  PT/OT/OOB as tolerated  MM Pain control: Oxy 5/10 prn, tylenol 1g, baclofen scheduled, lyrica 150 daily 300 nightly.   Maintain HV drains  FU with plastic for management of ANDREINA drains  TLSO wear when OOB  Itching from allergy and adhesive: benadryl and atarax  XR TL Spine   Voiding spontaneously  MM pain control: increased pain meds  DVT ppx: LVX    D/w staff, Dr. Coe and on call staff Dr. Joiner

## 2025-03-15 NOTE — PT/OT/SLP PROGRESS
"Physical Therapy      Patient Name:  Fior Mckeon   MRN:  [id number]    Patient not seen today secondary to Other (Comment) (2 attempts for tx session: 1. Pt found using restroom at 12:13 pm; 2. Pt found sup in bed sleeping soundly.  PTA rouses pt who declines tx session stating "I'm tired.  I'd rather not get up right now because I'm going to start hurting" Pt reports getting OOB and going to the restroom multiple times today). Will follow-up on next scheduled visit.    "

## 2025-03-16 PROBLEM — I95.81 POSTOPERATIVE HYPOTENSION: Status: RESOLVED | Noted: 2025-03-11 | Resolved: 2025-03-16

## 2025-03-16 LAB
ANION GAP SERPL CALC-SCNC: 10 MMOL/L (ref 8–16)
ANION GAP SERPL CALC-SCNC: 7 MMOL/L (ref 8–16)
BASOPHILS # BLD AUTO: 0.05 K/UL (ref 0–0.2)
BASOPHILS NFR BLD: 0.3 % (ref 0–1.9)
BUN SERPL-MCNC: 8 MG/DL (ref 8–23)
BUN SERPL-MCNC: 8 MG/DL (ref 8–23)
CALCIUM SERPL-MCNC: 8.2 MG/DL (ref 8.7–10.5)
CALCIUM SERPL-MCNC: 8.3 MG/DL (ref 8.7–10.5)
CHLORIDE SERPL-SCNC: 100 MMOL/L (ref 95–110)
CHLORIDE SERPL-SCNC: 100 MMOL/L (ref 95–110)
CO2 SERPL-SCNC: 25 MMOL/L (ref 23–29)
CO2 SERPL-SCNC: 25 MMOL/L (ref 23–29)
CREAT SERPL-MCNC: 0.6 MG/DL (ref 0.5–1.4)
CREAT SERPL-MCNC: 0.6 MG/DL (ref 0.5–1.4)
DIFFERENTIAL METHOD BLD: ABNORMAL
EOSINOPHIL # BLD AUTO: 0.2 K/UL (ref 0–0.5)
EOSINOPHIL NFR BLD: 0.9 % (ref 0–8)
ERYTHROCYTE [DISTWIDTH] IN BLOOD BY AUTOMATED COUNT: 15.7 % (ref 11.5–14.5)
EST. GFR  (NO RACE VARIABLE): >60 ML/MIN/1.73 M^2
EST. GFR  (NO RACE VARIABLE): >60 ML/MIN/1.73 M^2
GLUCOSE SERPL-MCNC: 86 MG/DL (ref 70–110)
GLUCOSE SERPL-MCNC: 87 MG/DL (ref 70–110)
HCT VFR BLD AUTO: 25.9 % (ref 37–48.5)
HGB BLD-MCNC: 8.2 G/DL (ref 12–16)
IMM GRANULOCYTES # BLD AUTO: 0.07 K/UL (ref 0–0.04)
IMM GRANULOCYTES NFR BLD AUTO: 0.4 % (ref 0–0.5)
LYMPHOCYTES # BLD AUTO: 12.8 K/UL (ref 1–4.8)
LYMPHOCYTES NFR BLD: 67.6 % (ref 18–48)
MAGNESIUM SERPL-MCNC: 2 MG/DL (ref 1.6–2.6)
MCH RBC QN AUTO: 29 PG (ref 27–31)
MCHC RBC AUTO-ENTMCNC: 31.7 G/DL (ref 32–36)
MCV RBC AUTO: 92 FL (ref 82–98)
MONOCYTES # BLD AUTO: 1 K/UL (ref 0.3–1)
MONOCYTES NFR BLD: 5.1 % (ref 4–15)
NEUTROPHILS # BLD AUTO: 4.9 K/UL (ref 1.8–7.7)
NEUTROPHILS NFR BLD: 25.7 % (ref 38–73)
NRBC BLD-RTO: 0 /100 WBC
OSMOLALITY SERPL: 277 MOSM/KG (ref 275–295)
OSMOLALITY UR: 208 MOSM/KG (ref 50–1200)
PHOSPHATE SERPL-MCNC: 3.7 MG/DL (ref 2.7–4.5)
PLATELET # BLD AUTO: 177 K/UL (ref 150–450)
PMV BLD AUTO: 10.3 FL (ref 9.2–12.9)
POTASSIUM SERPL-SCNC: 3.8 MMOL/L (ref 3.5–5.1)
POTASSIUM SERPL-SCNC: 3.8 MMOL/L (ref 3.5–5.1)
RBC # BLD AUTO: 2.83 M/UL (ref 4–5.4)
SODIUM SERPL-SCNC: 132 MMOL/L (ref 136–145)
SODIUM SERPL-SCNC: 135 MMOL/L (ref 136–145)
SODIUM UR-SCNC: 30 MMOL/L (ref 20–250)
WBC # BLD AUTO: 18.97 K/UL (ref 3.9–12.7)

## 2025-03-16 PROCEDURE — 63600175 PHARM REV CODE 636 W HCPCS: Performed by: NURSE PRACTITIONER

## 2025-03-16 PROCEDURE — 84100 ASSAY OF PHOSPHORUS: CPT | Performed by: NURSE PRACTITIONER

## 2025-03-16 PROCEDURE — 83735 ASSAY OF MAGNESIUM: CPT | Performed by: NURSE PRACTITIONER

## 2025-03-16 PROCEDURE — 25000003 PHARM REV CODE 250: Performed by: NURSE PRACTITIONER

## 2025-03-16 PROCEDURE — 83935 ASSAY OF URINE OSMOLALITY: CPT

## 2025-03-16 PROCEDURE — 25000003 PHARM REV CODE 250

## 2025-03-16 PROCEDURE — 80048 BASIC METABOLIC PNL TOTAL CA: CPT | Performed by: STUDENT IN AN ORGANIZED HEALTH CARE EDUCATION/TRAINING PROGRAM

## 2025-03-16 PROCEDURE — 97116 GAIT TRAINING THERAPY: CPT | Mod: CQ

## 2025-03-16 PROCEDURE — 80048 BASIC METABOLIC PNL TOTAL CA: CPT | Mod: 91

## 2025-03-16 PROCEDURE — 85007 BL SMEAR W/DIFF WBC COUNT: CPT | Performed by: STUDENT IN AN ORGANIZED HEALTH CARE EDUCATION/TRAINING PROGRAM

## 2025-03-16 PROCEDURE — 25000003 PHARM REV CODE 250: Performed by: STUDENT IN AN ORGANIZED HEALTH CARE EDUCATION/TRAINING PROGRAM

## 2025-03-16 PROCEDURE — 84300 ASSAY OF URINE SODIUM: CPT

## 2025-03-16 PROCEDURE — 83930 ASSAY OF BLOOD OSMOLALITY: CPT

## 2025-03-16 PROCEDURE — 25000003 PHARM REV CODE 250: Performed by: PHYSICIAN ASSISTANT

## 2025-03-16 PROCEDURE — 11000001 HC ACUTE MED/SURG PRIVATE ROOM

## 2025-03-16 PROCEDURE — 63600175 PHARM REV CODE 636 W HCPCS

## 2025-03-16 PROCEDURE — 85027 COMPLETE CBC AUTOMATED: CPT | Performed by: STUDENT IN AN ORGANIZED HEALTH CARE EDUCATION/TRAINING PROGRAM

## 2025-03-16 RX ORDER — PSEUDOEPHEDRINE/ACETAMINOPHEN 30MG-500MG
100 TABLET ORAL ONCE
Status: COMPLETED | OUTPATIENT
Start: 2025-03-16 | End: 2025-03-16

## 2025-03-16 RX ORDER — AMOXICILLIN 250 MG
1 CAPSULE ORAL 2 TIMES DAILY
Status: DISCONTINUED | OUTPATIENT
Start: 2025-03-16 | End: 2025-03-17 | Stop reason: HOSPADM

## 2025-03-16 RX ORDER — SODIUM CHLORIDE 0.9 G/100ML
500 IRRIGANT IRRIGATION ONCE
Status: COMPLETED | OUTPATIENT
Start: 2025-03-16 | End: 2025-03-16

## 2025-03-16 RX ORDER — SYRING-NEEDL,DISP,INSUL,0.3 ML 29 G X1/2"
296 SYRINGE, EMPTY DISPOSABLE MISCELLANEOUS ONCE
Status: COMPLETED | OUTPATIENT
Start: 2025-03-16 | End: 2025-03-16

## 2025-03-16 RX ADMIN — POLYETHYLENE GLYCOL 3350 17 G: 17 POWDER, FOR SOLUTION ORAL at 10:03

## 2025-03-16 RX ADMIN — ENOXAPARIN SODIUM 40 MG: 40 INJECTION SUBCUTANEOUS at 05:03

## 2025-03-16 RX ADMIN — SODIUM CHLORIDE 500 ML: 900 IRRIGANT IRRIGATION at 05:03

## 2025-03-16 RX ADMIN — OXYCODONE HYDROCHLORIDE 10 MG: 10 TABLET ORAL at 06:03

## 2025-03-16 RX ADMIN — OXYCODONE HYDROCHLORIDE 10 MG: 10 TABLET ORAL at 02:03

## 2025-03-16 RX ADMIN — ACETAMINOPHEN 1000 MG: 500 TABLET ORAL at 05:03

## 2025-03-16 RX ADMIN — OXYCODONE HYDROCHLORIDE 10 MG: 10 TABLET ORAL at 08:03

## 2025-03-16 RX ADMIN — ATORVASTATIN CALCIUM 20 MG: 20 TABLET, FILM COATED ORAL at 10:03

## 2025-03-16 RX ADMIN — OXYCODONE HYDROCHLORIDE 10 MG: 10 TABLET ORAL at 10:03

## 2025-03-16 RX ADMIN — CEFAZOLIN 2 G: 2 INJECTION, POWDER, FOR SOLUTION INTRAMUSCULAR; INTRAVENOUS at 11:03

## 2025-03-16 RX ADMIN — Medication 1 CAPSULE: at 10:03

## 2025-03-16 RX ADMIN — CEFAZOLIN 2 G: 2 INJECTION, POWDER, FOR SOLUTION INTRAMUSCULAR; INTRAVENOUS at 03:03

## 2025-03-16 RX ADMIN — PANTOPRAZOLE SODIUM 20 MG: 20 TABLET, DELAYED RELEASE ORAL at 10:03

## 2025-03-16 RX ADMIN — Medication 250 MG: at 10:03

## 2025-03-16 RX ADMIN — SENNOSIDES AND DOCUSATE SODIUM 1 TABLET: 50; 8.6 TABLET ORAL at 09:03

## 2025-03-16 RX ADMIN — LACTULOSE 20 G: 20 SOLUTION ORAL at 10:03

## 2025-03-16 RX ADMIN — MAGNESIUM CITRATE 296 ML: 1.75 LIQUID ORAL at 05:03

## 2025-03-16 RX ADMIN — Medication 324 MG: at 10:03

## 2025-03-16 RX ADMIN — Medication 100 ML: at 05:03

## 2025-03-16 RX ADMIN — CEFAZOLIN 2 G: 2 INJECTION, POWDER, FOR SOLUTION INTRAMUSCULAR; INTRAVENOUS at 10:03

## 2025-03-16 RX ADMIN — BACLOFEN 10 MG: 10 TABLET ORAL at 09:03

## 2025-03-16 RX ADMIN — BISACODYL 10 MG: 10 SUPPOSITORY RECTAL at 10:03

## 2025-03-16 RX ADMIN — BACLOFEN 10 MG: 10 TABLET ORAL at 03:03

## 2025-03-16 RX ADMIN — ACETAMINOPHEN 1000 MG: 500 TABLET ORAL at 01:03

## 2025-03-16 RX ADMIN — PREGABALIN 300 MG: 150 CAPSULE ORAL at 09:03

## 2025-03-16 RX ADMIN — ACETAMINOPHEN 1000 MG: 500 TABLET ORAL at 09:03

## 2025-03-16 RX ADMIN — BACLOFEN 10 MG: 10 TABLET ORAL at 10:03

## 2025-03-16 RX ADMIN — ASPIRIN 81 MG: 81 TABLET, COATED ORAL at 10:03

## 2025-03-16 RX ADMIN — METOPROLOL SUCCINATE 12.5 MG: 25 TABLET, EXTENDED RELEASE ORAL at 10:03

## 2025-03-16 RX ADMIN — OXYCODONE HYDROCHLORIDE 10 MG: 10 TABLET ORAL at 03:03

## 2025-03-16 RX ADMIN — SENNOSIDES AND DOCUSATE SODIUM 1 TABLET: 50; 8.6 TABLET ORAL at 01:03

## 2025-03-16 RX ADMIN — PREGABALIN 150 MG: 150 CAPSULE ORAL at 10:03

## 2025-03-16 NOTE — ASSESSMENT & PLAN NOTE
Hyponatremia is likely due to unclear etiology. The patient's most recent sodium results are listed below.  Recent Labs     03/14/25  1841 03/15/25  0712 03/16/25  0810   * 133* 132*  135*     Plan  - Correct the sodium by 4-6mEq in 24 hours.   - Will follow up on urine Na, urine Osm, Serum Osm to evaluate etiology  - Monitor sodium Daily.   - Patient hyponatremia is stable  - Unclear etiology of hyponatremia, unlikely hypovolemic in setting of good fluid intake. Possibly hypervolemic d/t excess fluid intake or medication related.

## 2025-03-16 NOTE — PT/OT/SLP PROGRESS
"Physical Therapy Treatment    Patient Name:  Fior Mckeon   MRN:  [id number]    Recommendations:     Discharge Recommendations: Low Intensity Therapy  Discharge Equipment Recommendations: bath bench, hip kit, walker, rolling  Barriers to discharge: None    Assessment:     Fior Mckeon is a 64 y.o. female admitted with a medical diagnosis of Spinal stenosis of lumbar region without neurogenic claudication.  She presents with the following impairments/functional limitations: weakness, impaired endurance, impaired self care skills, impaired functional mobility, gait instability, impaired balance, pain, orthopedic precautions. Pt agreeable for therapy, sitting EOB prior to treat, TLSO donned/doffed  in sitting, required SBA for sit <> stand, CGA for gait, up to chair at end of session    Rehab Prognosis: Fair; patient would benefit from acute skilled PT services to address these deficits and reach maximum level of function.    Recent Surgery: Procedure(s) (LRB):  *AIRO* T10-PELVIS POSTERIOR INSTRUMENTED FUSION (N/A)  CLOSURE, SURGICAL WOUND OR DEHISCENCE, EXTENSIVE OR COMPLEX, SECONDARY 5 Days Post-Op    Plan:     During this hospitalization, patient to be seen 5 x/week to address the identified rehab impairments via gait training, therapeutic activities, therapeutic exercises, neuromuscular re-education and progress toward the following goals:    Plan of Care Expires:  04/10/25    Subjective     Chief Complaint: pain  Patient/Family Comments/goals: "I just don't want to  feel the pain any more"  Pain/Comfort:  Pain Rating 1: 10/10  Location - Orientation 1: lower  Location 1: back  Pain Addressed 1: Pre-medicate for activity, Reposition, Distraction, Cessation of Activity  Pain Rating Post-Intervention 1: 10/10      Objective:     Communicated with RN prior to session.  Patient found sitting edge of bed with TLSO, telemetry, hemovac, ANDREINA drain upon PT entry to room.     General Precautions: " Standard, fall  Orthopedic Precautions: spinal precautions  Braces: TLSO  Respiratory Status: Room air     Functional Mobility:    Transfers:   Sit <> Stand Transfer: stand by assistance from EOB using rolling walker ; pt stands from low surface of commode w/min A    Balance:   Sitting balance: GOOD: Maintains balance through MODERATE excursions of active trunk movement  Standing balance:   FAIR: Maintains without assist but unable to take challenges  FAIR: Needs CONTACT GUARD during gait                 Gait:  Distance: 60' + 50' + 10'   Assistive Device: RW  Assistance Level: contact guard assistance  Gait Assessment: slow greg, occasional VC's for posture and pursed lipped breathing, ambulates to bathroom prior to recliner chair             AM-PAC 6 CLICK MOBILITY  Turning over in bed (including adjusting bedclothes, sheets and blankets)?: 3  Sitting down on and standing up from a chair with arms (e.g., wheelchair, bedside commode, etc.): 3  Moving from lying on back to sitting on the side of the bed?: 3  Moving to and from a bed to a chair (including a wheelchair)?: 3  Need to walk in hospital room?: 3  Climbing 3-5 steps with a railing?: 3  Basic Mobility Total Score: 18       Treatment & Education:  Education:  PT role and PoC to increase strength and endurance  Spine precautions    Patient left up in chair with all lines intact, call button in reach, chair alarm on, and RN notified..    GOALS:   Multidisciplinary Problems       Physical Therapy Goals          Problem: Physical Therapy    Goal Priority Disciplines Outcome Interventions   Physical Therapy Goal     PT, PT/OT Progressing    Description: PT goals until 3/22/25    1. Pt supine to sit through sidelying with mod independent-not met  2. Pt sit to supine through sidelying with mod independent-not met  3. Pt sit to stand with RW or pt's own lofstrand crutches as needed for safety with supervision- met with RW 3/13/25  4. Pt to perform gait 150ft with  RW or pt's own lofstrand crutches  with supervision.-not met  5. Pt to transfer bed to/from bedside chair with RW or lofstrand crutches with supervision.-not met                         Time Tracking:     PT Received On: 03/16/25  PT Start Time: 1037     PT Stop Time: 1107  PT Total Time (min): 30 min     Billable Minutes: Gait Training 30    Treatment Type: Treatment  PT/PTA: PTA     Number of PTA visits since last PT visit: 1 03/16/2025

## 2025-03-16 NOTE — ASSESSMENT & PLAN NOTE
Anemia is likely due to acute blood loss which was from T11-pelvis fusion on 3/11. Most recent hemoglobin and hematocrit are listed below.  Recent Labs     03/14/25  1841 03/15/25  0712 03/16/25  0810   HGB 8.5*  8.5* 7.8* 8.2*   HCT 26.5*  26.5* 24.3* 25.9*     Plan  - Monitor serial CBC: Daily  - Transfuse PRBC if patient becomes hemodynamically unstable, symptomatic or H/H drops below 7/21.  - Patient has received 4 units of PRBCs on 3/11 and 3/14  - Patient's anemia is currently stable  - Continue to monitor

## 2025-03-16 NOTE — ASSESSMENT & PLAN NOTE
Fior Dc has flat back deformity and lumbar stenosis with neurogenic claudication. She is now s/p T11-pelvis fusion (3/11).     POD 5    Admitted to neurosurgery, floor status  Q4 hour neurochecks  Continue HV drain and ANDREINA drains/Prevena per plastic surgery  PT/OT/OOB as tolerated  MM Pain control: Oxy 5/10 prn, tylenol 1g, baclofen scheduled, lyrica 150 daily 300 nightly.   TLSO wear when OOB  Itching from allergy and adhesive: benadryl  XR TL Spine pending  Hyponatremia improving, HM following for comanagement, appreciate assistance  Voiding spontaneously  MM pain control with aggressive bowel reg, d/w HM  DVT ppx: LVX    D/w staff, Dr. Coe and on call staff Dr. Joiner

## 2025-03-16 NOTE — ASSESSMENT & PLAN NOTE
S/p elective T11-pelvis posterior fusion, L4-S1 TLIF and L1-L4 SPO for deformity correction and bilateral paraspinal muscle flap closure for with NSGY and plastic surgery 3/11/25. POD 4. Reports pain well controlled currently     - continue management per NSGY primary team  - MM Pain control: Oxy 5/10 prn, tylenol 1g, baclofen scheduled, lyrica 150 daily 300 nightly.   - TLSO brace per primary

## 2025-03-16 NOTE — ASSESSMENT & PLAN NOTE
Fior Dc has flat back deformity and lumbar stenosis with neurogenic claudication. She is now s/p T11-pelvis fusion (3/11).     POD 5    Admitted to neurosurgery, floor status  Q4 hour neurochecks  Continue HV drain and ANDREINA drains/Prevena per plastic surgery  PT/OT/OOB as tolerated  MM Pain control: Oxy 5/10 prn, tylenol 1g, baclofen scheduled, lyrica 150 daily 300 nightly.   TLSO wear when OOB  Itching from allergy and adhesive: benadryl  XR TL Spine pending  Hyponatremia improving, HM following for comanagement, appreciate assistance  Voiding spontaneously  MM pain control with aggressive bowel reg, last BM 3/15  DVT ppx: LVX    D/w staff, Dr. Coe and on call staff Dr. Joiner

## 2025-03-16 NOTE — SUBJECTIVE & OBJECTIVE
Interval History: NAEON. H/H 8/26 this AM. 2 BM overnight, c/o gas pain. HV and ANDREINA output downtrending. HM following, appreciate assistance.    Medications:  Continuous Infusions:  Scheduled Meds:   acetaminophen  1,000 mg Oral Q8H    ascorbic acid (vitamin C)  250 mg Oral Daily    aspirin  81 mg Oral Daily    atorvastatin  20 mg Oral Daily    baclofen  10 mg Oral TID    ceFAZolin (Ancef) IV (PEDS and ADULTS)  2 g Intravenous Q8H    enoxparin  40 mg Subcutaneous Q24H (prophylaxis, 1700)    ferrous gluconate  324 mg Oral Daily with breakfast    Lactobacillus rhamnosus GG  1 capsule Oral Daily    lactulose  20 g Oral Daily    metoprolol succinate  12.5 mg Oral Daily    pantoprazole  20 mg Oral Daily    polyethylene glycol  17 g Oral Daily    pregabalin  150 mg Oral Daily    pregabalin  300 mg Oral QHS     PRN Meds:  Current Facility-Administered Medications:     0.9%  NaCl infusion (for blood administration), , Intravenous, Q24H PRN    albuterol-ipratropium, 3 mL, Nebulization, Q4H PRN    bisacodyL, 10 mg, Rectal, Daily PRN    diphenhydrAMINE, 25 mg, Intravenous, Q6H PRN    naloxone, 0.4 mg, Intravenous, PRN    nicotine, 1 patch, Transdermal, Daily PRN    oxyCODONE, 5 mg, Oral, Q4H PRN    oxyCODONE, 10 mg, Oral, Q4H PRN    senna, 8.6 mg, Oral, Daily PRN     Review of Systems  Objective:     Weight: 107.5 kg (236 lb 15.9 oz)  Body mass index is 36.03 kg/m².  Vital Signs (Most Recent):  Temp: 98.3 °F (36.8 °C) (03/16/25 0712)  Pulse: 73 (03/16/25 0712)  Resp: 18 (03/16/25 0712)  BP: 127/71 (03/16/25 0712)  SpO2: 95 % (03/16/25 0712) Vital Signs (24h Range):  Temp:  [98.3 °F (36.8 °C)-99 °F (37.2 °C)] 98.3 °F (36.8 °C)  Pulse:  [67-91] 73  Resp:  [16-20] 18  SpO2:  [93 %-97 %] 95 %  BP: ()/(57-71) 127/71                              Closed/Suction Drain 03/11/25 1230 Tube - 1 Right;Inferior Back Bulb 15 Fr. (Active)   Site Description Healing 03/16/25 0735   Dressing Type Transparent (Tegaderm) 03/16/25 0746    Dressing Status Clean;Dry;Intact 03/16/25 0735   Dressing Intervention Integrity maintained 03/16/25 0735   Drainage Serosanguineous 03/16/25 0735   Status To bulb suction 03/16/25 0735   Output (mL) 60 mL 03/16/25 0000            Closed/Suction Drain 03/11/25 1230 Tube - 2 Left;Inferior Back Bulb 15 Fr. (Active)   Site Description Healing 03/16/25 0735   Dressing Type Transparent (Tegaderm) 03/16/25 0735   Dressing Status Clean;Dry;Intact 03/16/25 0735   Dressing Intervention Integrity maintained 03/16/25 0735   Drainage Serosanguineous 03/16/25 0735   Status To bulb suction 03/16/25 0735   Output (mL) 80 mL 03/16/25 0000            Closed/Suction Drain 03/11/25 1307 Tube - 2 Left Back Accordion 10 Fr. (Active)   Site Description Healing 03/16/25 0735   Dressing Type Transparent (Tegaderm) 03/16/25 0735   Dressing Status Clean;Dry;Intact 03/16/25 0735   Dressing Intervention Integrity maintained 03/16/25 0735   Drainage Serosanguineous 03/16/25 0735   Status To bulb suction 03/16/25 0735   Output (mL) 5 mL 03/16/25 0000       Female External Urinary Catheter w/ Suction 03/13/25 2315 (Active)   Skin no redness;no breakdown 03/16/25 0735   Tolerance no signs/symptoms of discomfort 03/16/25 0735   Suction Continuous suction at 60 mmHg 03/16/25 0735          Physical Exam         Neurosurgery Physical Exam  Gen: well nourished, normocephalic, atraumatic  CV: skin warm and dry, distal pulses present  Pulm: chest rise symmetric, no increased work of breathing  GI: abdomen soft, non-distended, non-tender  : patient voiding independently  MSK: no bony abnormalities noted  Psych: patient interacting with appropriate mood and affect     5/5 strength BLE  Sensation present to light touch in BLE  CN intact     Incision c/d/i    Significant Labs:  Recent Labs   Lab 03/14/25  1841 03/15/25  0712 03/16/25  0810   * 91 87  86   * 133* 132*  135*   K 3.8 3.8 3.8  3.8    102 100  100   CO2 25 25 25 25    BUN 13 11 8  8   CREATININE 0.7 0.6 0.6  0.6   CALCIUM 8.2* 8.0* 8.3*  8.2*   MG 1.9 1.9 2.0     Recent Labs   Lab 03/14/25  1841 03/15/25  0712 03/16/25  0810   WBC 25.86*  25.86* 22.16* 18.97*   HGB 8.5*  8.5* 7.8* 8.2*   HCT 26.5*  26.5* 24.3* 25.9*   *  141* 156 177     Recent Labs   Lab 03/14/25  1841   INR 0.9   APTT 24.3     Microbiology Results (last 7 days)       ** No results found for the last 168 hours. **          All pertinent labs from the last 24 hours have been reviewed.    Significant Diagnostics:  I have reviewed all pertinent imaging results/findings within the past 24 hours.

## 2025-03-16 NOTE — PROGRESS NOTES
[facility] - Neurosurgery ([facility])  [facility] Medicine  Progress Note    Patient Name: Fior Mckeon  MRN: [id number]  Patient Class: IP- Inpatient   Admission Date: 3/11/2025  Length of Stay: 5 days  Attending Physician: Sharad Coe MD  Primary Care Provider: Matias Downs MD      Subjective     Principal Problem:Spinal stenosis of lumbar region without neurogenic claudication      HPI:  Fior Mckeon is a 64 y.o. female PMH gastric bypass, CHF, CAD s/p stents x 3, has flat back deformity and lumbar stenosis with neurogenic claudication. She is now s/p T11-pelvis fusion (3/11). Hospital medicine consulted for assistance with new hyponatremia.     Back Hx: Has flat back deformity with lumbar stenosis and neurogenic claudication. Failed conservative management. S/p L4-5 MIS laminectomy with Dr. Walters 10/23/23. Previous decompression at L5-S1 at OSH. Progressively worsening since 2020. She tried L5-S1 facet injections PT, and massage therapy without relief. Now POD 4 from T11-pelvis fusion.     CAD hx: patient report 3 prior stents and that [facility]. Per chart review: cardiac stents placed in March/April 2024 and was on Plavix. Per her cardiologist at the [facility]. After discussion with cardiologist she will remain on aspirin 81mg through surgery.  Also, had resultant ICM and HFreF after stents in 2024 during which she reportedly had EF ~ 30%. Recent echo 3/11 with EF 55- 60%, and normal diastolic function.     Patient reported severe abdominal pain this morning related to constipation. She said she had not had a BM in about 4 days. She says that she has been eating and drinking well, about 4 large bottles a day. She denies any lightheaded, dizziness, HA, or N/V.       Interval History: No acute events. Patient still without bowel movement, will up-titrate regimen with suppositories. Will follow-up on urine and serum studies to evaluate hyponatremia.    Review of  Systems   Constitutional:  Negative for appetite change.   Cardiovascular:  Negative for chest pain.   Gastrointestinal:  Positive for abdominal pain and constipation. Negative for nausea and vomiting.   Neurological:  Negative for dizziness, light-headedness and headaches.     Objective:     Vital Signs (Most Recent):  Temp: 98.3 °F (36.8 °C) (03/16/25 0712)  Pulse: 73 (03/16/25 0712)  Resp: 18 (03/16/25 0712)  BP: 127/71 (03/16/25 0712)  SpO2: 95 % (03/16/25 0712) Vital Signs (24h Range):  Temp:  [98.3 °F (36.8 °C)-99 °F (37.2 °C)] 98.3 °F (36.8 °C)  Pulse:  [67-91] 73  Resp:  [16-20] 18  SpO2:  [93 %-97 %] 95 %  BP: ()/(57-71) 127/71     Weight: 107.5 kg (236 lb 15.9 oz)  Body mass index is 36.03 kg/m².    Intake/Output Summary (Last 24 hours) at 3/16/2025 0958  Last data filed at 3/16/2025 0629  Gross per 24 hour   Intake --   Output 305 ml   Net -305 ml         Physical Exam  Constitutional:       General: She is in acute distress.      Appearance: She is not ill-appearing or diaphoretic.   HENT:      Head: Normocephalic and atraumatic.      Right Ear: External ear normal.      Left Ear: External ear normal.      Nose: Nose normal.      Mouth/Throat:      Mouth: Mucous membranes are moist.   Eyes:      Extraocular Movements: Extraocular movements intact.   Cardiovascular:      Rate and Rhythm: Normal rate and regular rhythm.      Heart sounds: No murmur heard.  Pulmonary:      Effort: Pulmonary effort is normal. No respiratory distress.      Breath sounds: No wheezing or rhonchi.   Abdominal:      General: Abdomen is flat. There is no distension.      Tenderness: There is abdominal tenderness.   Musculoskeletal:         General: Normal range of motion.      Cervical back: Normal range of motion.      Right lower leg: No edema.      Left lower leg: No edema.   Skin:     General: Skin is warm.      Capillary Refill: Capillary refill takes less than 2 seconds.   Neurological:      General: No focal deficit  present.      Mental Status: She is alert.         Significant Labs: All pertinent labs within the past 24 hours have been reviewed.    Significant Imaging: I have reviewed all pertinent imaging results/findings within the past 24 hours.      Assessment & Plan  Spinal stenosis of lumbar region without neurogenic claudication  DDD (degenerative disc disease), lumbar  S/p elective T11-pelvis posterior fusion, L4-S1 TLIF and L1-L4 SPO for deformity correction and bilateral paraspinal muscle flap closure for with NSGY and plastic surgery 3/11/25. POD 4. Reports pain well controlled currently     - continue management per NSGY primary team  - MM Pain control: Oxy 5/10 prn, tylenol 1g, baclofen scheduled, lyrica 150 daily 300 nightly.   - TLSO brace per primary       CLL (chronic lymphocytic leukemia)  Hx of CLL. Has consistently elevated leukocytosis     - Continue to monitor  Tobacco dependency  - continue nicotine patch  Acute blood loss anemia  Anemia is likely due to acute blood loss which was from T11-pelvis fusion on 3/11 . Most recent hemoglobin and hematocrit are listed below.  Recent Labs     03/14/25  1841 03/15/25  0712 03/16/25  0810   HGB 8.5*  8.5* 7.8* 8.2*   HCT 26.5*  26.5* 24.3* 25.9*     Plan  - Monitor serial CBC: Daily  - Transfuse PRBC if patient becomes hemodynamically unstable, symptomatic or H/H drops below 7/21.  - Patient has received 4 units of PRBCs on 3/11 and 3/14  - Patient's anemia is currently stable  - Continue to monitor  HFrEF (heart failure with reduced ejection fraction)  History of CAD s/p 3 stents in 2024 with resultant ICM and HFrEF. Reports EF improved from 30% to 60%. Not overtly fluid overloaded on exam.  Echo 3/11/25 with EF 55- 60%, and normal diastolic function. Mild AV sclerosis and mild TV regurg.   GDMT originally held in setting of post- op hypotension     - Continue metoprolol 12.5mg daily   - Hold jardiance, spironolactone and valsartan and restart as  tolerated  History of alcohol abuse  Reported history during COVID. Stopped cold turkey in [id number] with resultant seizure. Reports only drinks only occasionally now and no recent alcohol use.     - monitor for signs/symptoms of alcohol withdrawal    Postoperative hypotension (Resolved: 3/16/2025)  Resolved    Hyponatremia  Hyponatremia is likely due to unclear etiology. The patient's most recent sodium results are listed below.  Recent Labs     03/14/25  1841 03/15/25  0712 03/16/25  0810   * 133* 132*  135*     Plan  - Correct the sodium by 4-6mEq in 24 hours.   - Will follow up on urine Na, urine Osm, Serum Osm to evaluate etiology  - Monitor sodium Daily.   - Patient hyponatremia is stable  - Unclear etiology of hyponatremia, unlikely hypovolemic in setting of good fluid intake. Possibly hypervolemic d/t excess fluid intake or medication related.    Constipation  Reports hx of chronic constipation. Says that at home she takes lactulose,senna, and probiotic. Reports abdominal pain d/t constipation.     - Gave Bisacodyl suppository today; if no bowel movement then will trial brown bomb this afternoon   - prn bisacodyl suppository   - prn senna   - continue lactulose  - continue miralax     VTE Risk Mitigation (From admission, onward)           Ordered     enoxaparin injection 40 mg  Every 24 hours         03/11/25 1352     IP VTE LOW RISK PATIENT  Once         03/11/25 0514                    Discharge Planning   GILMER: 3/17/2025     Code Status: Full Code   Medical Readiness for Discharge Date:   Discharge Plan A: Skilled Nursing Facility            Ladarius Newman MD  Department of Hospital Medicine   [facility] - Neurosurgery ([facility])

## 2025-03-16 NOTE — ASSESSMENT & PLAN NOTE
Reports hx of chronic constipation. Says that at home she takes lactulose,senna, and probiotic. Reports abdominal pain d/t constipation.     - Gave Bisacodyl suppository today; if no bowel movement then will trial brown bomb this afternoon   - prn bisacodyl suppository   - prn senna   - continue lactulose  - continue miralax

## 2025-03-16 NOTE — PROGRESS NOTES
Plastic and Reconstructive Surgery   Progress Note    Subjective:    Drains 240/170 slowing down      Objective:  Vital signs in last 24 hours:  Temp:  [98.3 °F (36.8 °C)-99 °F (37.2 °C)] 99 °F (37.2 °C)  Pulse:  [67-91] 77  Resp:  [18-20] 20  SpO2:  [93 %-97 %] 96 %  BP: ()/(57-71) 121/67    Intake/Output last 3 shifts:  I/O last 3 completed shifts:  In: -   Out: 795 [Drains:795]    Intake/Output this shift:  I/O this shift:  In: -   Out: 130 [Drains:130]        Physical Exam:  VITAL SIGNS:   Vitals:    25 1153 25 1502 25 1518 25 1533   BP: 102/62   121/67   BP Location:       Patient Position:       Pulse: 89 72  77   Resp: 18  18 20   Temp: 98.5 °F (36.9 °C)   99 °F (37.2 °C)   TempSrc: Oral   Oral   SpO2: (!) 93%   96%   Weight:       Height:         TMAX: Temp (24hrs), Av.6 °F (37 °C), Min:98.3 °F (36.8 °C), Max:99 °F (37.2 °C)      General: Alert; No acute distress  Cardiovascular: Regular rate   Respiratory: Normal respiratory effort. Chest rise symmetric.   Abdomen: Soft, nontender, nondistended  Extremity: Moves all extremities equally.  Neurologic: No focal deficit. Speech normal  Midline incision w/ prevena and good seal  Drains ANDREINA x2 inferior back    Drains accordian are NSGY      Scheduled Medications acetaminophen, 1,000 mg, Q8H  ascorbic acid (vitamin C), 250 mg, Daily  aspirin, 81 mg, Daily  atorvastatin, 20 mg, Daily  baclofen, 10 mg, TID  ceFAZolin (Ancef) IV (PEDS and ADULTS), 2 g, Q8H  enoxparin, 40 mg, Q24H (prophylaxis, 0)  ferrous gluconate, 324 mg, Daily with breakfast  Lactobacillus rhamnosus GG, 1 capsule, Daily  lactulose, 20 g, Daily  metoprolol succinate, 12.5 mg, Daily  pantoprazole, 20 mg, Daily  polyethylene glycol, 17 g, Daily  pregabalin, 150 mg, Daily  pregabalin, 300 mg, QHS  senna-docusate 8.6-50 mg, 1 tablet, BID        PRN Medications     Current Facility-Administered Medications:     0.9%  NaCl infusion (for blood administration), ,  Intravenous, Q24H PRN    albuterol-ipratropium, 3 mL, Nebulization, Q4H PRN    bisacodyL, 10 mg, Rectal, Daily PRN    diphenhydrAMINE, 25 mg, Intravenous, Q6H PRN    naloxone, 0.4 mg, Intravenous, PRN    nicotine, 1 patch, Transdermal, Daily PRN    oxyCODONE, 5 mg, Oral, Q4H PRN    oxyCODONE, 10 mg, Oral, Q4H PRN    senna, 8.6 mg, Oral, Daily PRN    Recent Labs:   Lab Results   Component Value Date    WBC 18.97 (H) 03/16/2025    HGB 8.2 (L) 03/16/2025    HCT 25.9 (L) 03/16/2025    MCV 92 03/16/2025     03/16/2025     Lab Results   Component Value Date    GLU 87 03/16/2025    GLU 86 03/16/2025     (L) 03/16/2025     (L) 03/16/2025    K 3.8 03/16/2025    K 3.8 03/16/2025     03/16/2025     03/16/2025    BUN 8 03/16/2025    BUN 8 03/16/2025         Assessment:   64 y.o. y/o female s/p Procedure(s):  *AIRO* T10-PELVIS POSTERIOR INSTRUMENTED FUSION  CLOSURE, SURGICAL WOUND OR DEHISCENCE, EXTENSIVE OR COMPLEX, SECONDARY      5 Days Post-Op         Plan  - keep prevena on, plan for takedown Tuesday  - Drains: keep PRS ANDREINA drains x2 in place. Accordians per NSGY.   - will continue to monitor drain output closely  - monitor h/h, transfuse as necessary  - PRS will follow      Patient was discussed with Attending Plastic Surgeon, Dr. Denton Iyer MD- Fellow  Department of Plastic and Reconstructive Surgery

## 2025-03-16 NOTE — PROGRESS NOTES
Tyrone Stein - Neurosurgery ([facility])  Neurosurgery  Progress Note    Subjective:     History of Present Illness: HPI from 10/24/2024:  Patient presents for follow-up of low back pain. S/p L4-5 MIS laminectomy with Dr. Walters 10/23/23. Previous decompression at L5-S1 at OSH. Progressively worsening since 2020. She has tried L5-S1 facet injections, PT, and massage therapy without relief. The pain is constant. Occasional neck stiffness. Denies arm pain, leg pain, b/b dysfunction, hand clumsiness. She walks flexed forward as she is unable to stand upright without pain. She reports intermittent foot numbness following surgeries in both feet in the early 2000s. She underwent cardiac stent placement 3/2024 and is on plavix now. She states her cardiologist won't clear her until 1 year post-op to undergo spine fusion. She also uses nicotine but says she will quit in order to have surgery. [facility]      Interval Hx:   Today the patient presents to clinic to discuss surgery. Denies changes in symptoms since last visit. Continues to report debilitating back pain. She cannot walk long distances or stand for long periods of time without severe pain. She's been participating in pre-hab since the last visit. She denies any bowel/bladder issues.        Post-Op Info:  Procedure(s) (LRB):  *AIRO* T10-PELVIS POSTERIOR INSTRUMENTED FUSION (N/A)  CLOSURE, SURGICAL WOUND OR DEHISCENCE, EXTENSIVE OR COMPLEX, SECONDARY   5 Days Post-Op   Interval History: NAEON. H/H 8/26 this AM. Pending BM, c/o gas pain. HV and ANDREINA output downtrending. HM following, appreciate assistance.    Medications:  Continuous Infusions:  Scheduled Meds:   acetaminophen  1,000 mg Oral Q8H    ascorbic acid (vitamin C)  250 mg Oral Daily    aspirin  81 mg Oral Daily    atorvastatin  20 mg Oral Daily    baclofen  10 mg Oral TID    ceFAZolin (Ancef) IV (PEDS and ADULTS)  2 g Intravenous Q8H    enoxparin  40 mg Subcutaneous Q24H  (prophylaxis, 1700)    ferrous gluconate  324 mg Oral Daily with breakfast    Lactobacillus rhamnosus GG  1 capsule Oral Daily    lactulose  20 g Oral Daily    metoprolol succinate  12.5 mg Oral Daily    pantoprazole  20 mg Oral Daily    polyethylene glycol  17 g Oral Daily    pregabalin  150 mg Oral Daily    pregabalin  300 mg Oral QHS     PRN Meds:  Current Facility-Administered Medications:     0.9%  NaCl infusion (for blood administration), , Intravenous, Q24H PRN    albuterol-ipratropium, 3 mL, Nebulization, Q4H PRN    bisacodyL, 10 mg, Rectal, Daily PRN    diphenhydrAMINE, 25 mg, Intravenous, Q6H PRN    naloxone, 0.4 mg, Intravenous, PRN    nicotine, 1 patch, Transdermal, Daily PRN    oxyCODONE, 5 mg, Oral, Q4H PRN    oxyCODONE, 10 mg, Oral, Q4H PRN    senna, 8.6 mg, Oral, Daily PRN     Review of Systems  Objective:     Weight: 107.5 kg (236 lb 15.9 oz)  Body mass index is 36.03 kg/m².  Vital Signs (Most Recent):  Temp: 98.3 °F (36.8 °C) (03/16/25 0712)  Pulse: 73 (03/16/25 0712)  Resp: 18 (03/16/25 0712)  BP: 127/71 (03/16/25 0712)  SpO2: 95 % (03/16/25 0712) Vital Signs (24h Range):  Temp:  [98.3 °F (36.8 °C)-99 °F (37.2 °C)] 98.3 °F (36.8 °C)  Pulse:  [67-91] 73  Resp:  [16-20] 18  SpO2:  [93 %-97 %] 95 %  BP: ()/(57-71) 127/71                              Closed/Suction Drain 03/11/25 1230 Tube - 1 Right;Inferior Back Bulb 15 Fr. (Active)   Site Description Healing 03/16/25 0735   Dressing Type Transparent (Tegaderm) 03/16/25 0735   Dressing Status Clean;Dry;Intact 03/16/25 0735   Dressing Intervention Integrity maintained 03/16/25 0735   Drainage Serosanguineous 03/16/25 0735   Status To bulb suction 03/16/25 0735   Output (mL) 60 mL 03/16/25 0000            Closed/Suction Drain 03/11/25 1230 Tube - 2 Left;Inferior Back Bulb 15 Fr. (Active)   Site Description Healing 03/16/25 0735   Dressing Type Transparent (Tegaderm) 03/16/25 0735   Dressing Status Clean;Dry;Intact 03/16/25 0735   Dressing  Intervention Integrity maintained 03/16/25 0735   Drainage Serosanguineous 03/16/25 0735   Status To bulb suction 03/16/25 0735   Output (mL) 80 mL 03/16/25 0000            Closed/Suction Drain 03/11/25 1307 Tube - 2 Left Back Accordion 10 Fr. (Active)   Site Description Healing 03/16/25 0735   Dressing Type Transparent (Tegaderm) 03/16/25 0735   Dressing Status Clean;Dry;Intact 03/16/25 0735   Dressing Intervention Integrity maintained 03/16/25 0735   Drainage Serosanguineous 03/16/25 0735   Status To bulb suction 03/16/25 0735   Output (mL) 5 mL 03/16/25 0000       Female External Urinary Catheter w/ Suction 03/13/25 2315 (Active)   Skin no redness;no breakdown 03/16/25 0735   Tolerance no signs/symptoms of discomfort 03/16/25 0735   Suction Continuous suction at 60 mmHg 03/16/25 0735          Physical Exam         Neurosurgery Physical Exam  Gen: well nourished, normocephalic, atraumatic  CV: skin warm and dry, distal pulses present  Pulm: chest rise symmetric, no increased work of breathing  GI: abdomen soft, non-distended, non-tender  : patient voiding independently  MSK: no bony abnormalities noted  Psych: patient interacting with appropriate mood and affect     5/5 strength BLE  Sensation present to light touch in BLE  CN intact     Incision c/d/i    Significant Labs:  Recent Labs   Lab 03/14/25  1841 03/15/25  0712 03/16/25  0810   * 91 87  86   * 133* 132*  135*   K 3.8 3.8 3.8  3.8    102 100  100   CO2 25 25 25 25   BUN 13 11 8  8   CREATININE 0.7 0.6 0.6  0.6   CALCIUM 8.2* 8.0* 8.3*  8.2*   MG 1.9 1.9 2.0     Recent Labs   Lab 03/14/25  1841 03/15/25  0712 03/16/25  0810   WBC 25.86*  25.86* 22.16* 18.97*   HGB 8.5*  8.5* 7.8* 8.2*   HCT 26.5*  26.5* 24.3* 25.9*   *  141* 156 177     Recent Labs   Lab 03/14/25  1841   INR 0.9   APTT 24.3     Microbiology Results (last 7 days)       ** No results found for the last 168 hours. **          All pertinent labs from  the last 24 hours have been reviewed.    Significant Diagnostics:  I have reviewed all pertinent imaging results/findings within the past 24 hours.  Assessment/Plan:     * Spinal stenosis of lumbar region without neurogenic claudication  Fior Dc has flat back deformity and lumbar stenosis with neurogenic claudication. She is now s/p T11-pelvis fusion (3/11).     POD 5    Admitted to neurosurgery, floor status  Q4 hour neurochecks  Continue HV drain and ANDREINA drains/Prevena per plastic surgery  PT/OT/OOB as tolerated  MM Pain control: Oxy 5/10 prn, tylenol 1g, baclofen scheduled, lyrica 150 daily 300 nightly.   TLSO wear when OOB  Itching from allergy and adhesive: benadryl  XR TL Spine pending  Hyponatremia improving, HM following for comanagement, appreciate assistance  Voiding spontaneously  MM pain control with aggressive bowel reg, d/w HM  DVT ppx: LVX    D/w staff, Dr. Coe and on call staff Dr. Marisel Hoff MD  Neurosurgery  [facility] - Neurosurgery ([facility])

## 2025-03-16 NOTE — ASSESSMENT & PLAN NOTE
History of CAD s/p 3 stents in 2024 with resultant ICM and HFrEF. Reports EF improved from 30% to 60%. Not overtly fluid overloaded on exam.  Echo 3/11/25 with EF 55- 60%, and normal diastolic function. Mild AV sclerosis and mild TV regurg.   GDMT originally held in setting of post- op hypotension     - Continue metoprolol 12.5mg daily   - Hold jardiance, spironolactone and valsartan and restart as tolerated

## 2025-03-16 NOTE — PLAN OF CARE
Problem: Adult Inpatient Plan of Care  Goal: Plan of Care Review  Outcome: Progressing  Goal: Patient-Specific Goal (Individualized)  Outcome: Progressing  Goal: Optimal Comfort and Wellbeing  Outcome: Progressing  Goal: Readiness for Transition of Care  Outcome: Progressing     Problem: Adult Inpatient Plan of Care  Goal: Plan of Care Review  Outcome: Progressing     Problem: Adult Inpatient Plan of Care  Goal: Patient-Specific Goal (Individualized)  Outcome: Progressing     Problem: Adult Inpatient Plan of Care  Goal: Optimal Comfort and Wellbeing  Outcome: Progressing     Problem: Adult Inpatient Plan of Care  Goal: Readiness for Transition of Care  Outcome: Progressing     Problem: Wound  Goal: Optimal Coping  Outcome: Progressing  Goal: Optimal Functional Ability  Outcome: Progressing  Goal: Optimal Pain Control and Function  Outcome: Progressing     Problem: Wound  Goal: Optimal Coping  Outcome: Progressing     Problem: Wound  Goal: Optimal Functional Ability  Outcome: Progressing     Problem: Wound  Goal: Optimal Pain Control and Function  Outcome: Progressing       POC reviewed with the patient at the bedside. Verbalization of understanding voiced. Questions and concerns addressed. Precautions maintained. No events noted at present during this shift. Cardiac monitoring ongoing. Vital signs all shift. See flow sheet. Patient continues to require around the clock dosing with PRN Oxycodone for adequate pain control. ANDREINA drains x 2, Hemovac drain x 1, and wound vac , and abdominal binder remain in place. Patient is able to ambulate with LSO brace on and walker with one person assist to the bathroom without difficulty. Upon exiting room bed low and locked with call light within reach and bed alarm activated. POC ongoing.

## 2025-03-16 NOTE — ASSESSMENT & PLAN NOTE
Reported history during COVID. Stopped cold turkey in 0410-0886 with resultant seizure. Reports only drinks only occasionally now and no recent alcohol use.     - monitor for signs/symptoms of alcohol withdrawal

## 2025-03-16 NOTE — SUBJECTIVE & OBJECTIVE
Interval History: No acute events. Patient still without bowel movement, will up-titrate regimen with suppositories. Will follow-up on urine and serum studies to evaluate hyponatremia.    Review of Systems   Constitutional:  Negative for appetite change.   Cardiovascular:  Negative for chest pain.   Gastrointestinal:  Positive for abdominal pain and constipation. Negative for nausea and vomiting.   Neurological:  Negative for dizziness, light-headedness and headaches.     Objective:     Vital Signs (Most Recent):  Temp: 98.3 °F (36.8 °C) (03/16/25 0712)  Pulse: 73 (03/16/25 0712)  Resp: 18 (03/16/25 0712)  BP: 127/71 (03/16/25 0712)  SpO2: 95 % (03/16/25 0712) Vital Signs (24h Range):  Temp:  [98.3 °F (36.8 °C)-99 °F (37.2 °C)] 98.3 °F (36.8 °C)  Pulse:  [67-91] 73  Resp:  [16-20] 18  SpO2:  [93 %-97 %] 95 %  BP: ()/(57-71) 127/71     Weight: 107.5 kg (236 lb 15.9 oz)  Body mass index is 36.03 kg/m².    Intake/Output Summary (Last 24 hours) at 3/16/2025 0958  Last data filed at 3/16/2025 0629  Gross per 24 hour   Intake --   Output 305 ml   Net -305 ml         Physical Exam  Constitutional:       General: She is in acute distress.      Appearance: She is not ill-appearing or diaphoretic.   HENT:      Head: Normocephalic and atraumatic.      Right Ear: External ear normal.      Left Ear: External ear normal.      Nose: Nose normal.      Mouth/Throat:      Mouth: Mucous membranes are moist.   Eyes:      Extraocular Movements: Extraocular movements intact.   Cardiovascular:      Rate and Rhythm: Normal rate and regular rhythm.      Heart sounds: No murmur heard.  Pulmonary:      Effort: Pulmonary effort is normal. No respiratory distress.      Breath sounds: No wheezing or rhonchi.   Abdominal:      General: Abdomen is flat. There is no distension.      Tenderness: There is abdominal tenderness.   Musculoskeletal:         General: Normal range of motion.      Cervical back: Normal range of motion.      Right  lower leg: No edema.      Left lower leg: No edema.   Skin:     General: Skin is warm.      Capillary Refill: Capillary refill takes less than 2 seconds.   Neurological:      General: No focal deficit present.      Mental Status: She is alert.         Significant Labs: All pertinent labs within the past 24 hours have been reviewed.    Significant Imaging: I have reviewed all pertinent imaging results/findings within the past 24 hours.

## 2025-03-17 VITALS
SYSTOLIC BLOOD PRESSURE: 122 MMHG | TEMPERATURE: 99 F | OXYGEN SATURATION: 98 % | HEART RATE: 71 BPM | DIASTOLIC BLOOD PRESSURE: 74 MMHG | BODY MASS INDEX: 35.92 KG/M2 | RESPIRATION RATE: 20 BRPM | HEIGHT: 68 IN | WEIGHT: 237 LBS

## 2025-03-17 PROBLEM — E87.1 HYPONATREMIA: Status: RESOLVED | Noted: 2025-03-15 | Resolved: 2025-03-17

## 2025-03-17 LAB
ANION GAP SERPL CALC-SCNC: 10 MMOL/L (ref 8–16)
BASOPHILS # BLD AUTO: 0.05 K/UL (ref 0–0.2)
BASOPHILS NFR BLD: 0.2 % (ref 0–1.9)
BUN SERPL-MCNC: 9 MG/DL (ref 8–23)
CALCIUM SERPL-MCNC: 8.4 MG/DL (ref 8.7–10.5)
CHLORIDE SERPL-SCNC: 101 MMOL/L (ref 95–110)
CO2 SERPL-SCNC: 26 MMOL/L (ref 23–29)
CREAT SERPL-MCNC: 0.7 MG/DL (ref 0.5–1.4)
DIFFERENTIAL METHOD BLD: ABNORMAL
EOSINOPHIL # BLD AUTO: 0.2 K/UL (ref 0–0.5)
EOSINOPHIL NFR BLD: 0.9 % (ref 0–8)
ERYTHROCYTE [DISTWIDTH] IN BLOOD BY AUTOMATED COUNT: 15.2 % (ref 11.5–14.5)
EST. GFR  (NO RACE VARIABLE): >60 ML/MIN/1.73 M^2
GLUCOSE SERPL-MCNC: 88 MG/DL (ref 70–110)
HCT VFR BLD AUTO: 26.7 % (ref 37–48.5)
HGB BLD-MCNC: 8.5 G/DL (ref 12–16)
IMM GRANULOCYTES # BLD AUTO: 0.1 K/UL (ref 0–0.04)
IMM GRANULOCYTES NFR BLD AUTO: 0.5 % (ref 0–0.5)
LYMPHOCYTES # BLD AUTO: 14.6 K/UL (ref 1–4.8)
LYMPHOCYTES NFR BLD: 71.1 % (ref 18–48)
MAGNESIUM SERPL-MCNC: 2.1 MG/DL (ref 1.6–2.6)
MCH RBC QN AUTO: 29.5 PG (ref 27–31)
MCHC RBC AUTO-ENTMCNC: 31.8 G/DL (ref 32–36)
MCV RBC AUTO: 93 FL (ref 82–98)
MONOCYTES # BLD AUTO: 0.8 K/UL (ref 0.3–1)
MONOCYTES NFR BLD: 3.9 % (ref 4–15)
NEUTROPHILS # BLD AUTO: 4.8 K/UL (ref 1.8–7.7)
NEUTROPHILS NFR BLD: 23.4 % (ref 38–73)
NRBC BLD-RTO: 0 /100 WBC
PHOSPHATE SERPL-MCNC: 3.5 MG/DL (ref 2.7–4.5)
PLATELET # BLD AUTO: 207 K/UL (ref 150–450)
PMV BLD AUTO: 10.3 FL (ref 9.2–12.9)
POTASSIUM SERPL-SCNC: 3.9 MMOL/L (ref 3.5–5.1)
RBC # BLD AUTO: 2.88 M/UL (ref 4–5.4)
SODIUM SERPL-SCNC: 137 MMOL/L (ref 136–145)
WBC # BLD AUTO: 20.54 K/UL (ref 3.9–12.7)

## 2025-03-17 PROCEDURE — 80048 BASIC METABOLIC PNL TOTAL CA: CPT | Performed by: STUDENT IN AN ORGANIZED HEALTH CARE EDUCATION/TRAINING PROGRAM

## 2025-03-17 PROCEDURE — 63600175 PHARM REV CODE 636 W HCPCS: Performed by: NURSE PRACTITIONER

## 2025-03-17 PROCEDURE — 99024 POSTOP FOLLOW-UP VISIT: CPT | Mod: ,,, | Performed by: PHYSICIAN ASSISTANT

## 2025-03-17 PROCEDURE — 25000003 PHARM REV CODE 250: Performed by: PHYSICIAN ASSISTANT

## 2025-03-17 PROCEDURE — 94761 N-INVAS EAR/PLS OXIMETRY MLT: CPT

## 2025-03-17 PROCEDURE — 25000003 PHARM REV CODE 250

## 2025-03-17 PROCEDURE — 84100 ASSAY OF PHOSPHORUS: CPT | Performed by: NURSE PRACTITIONER

## 2025-03-17 PROCEDURE — 25000003 PHARM REV CODE 250: Performed by: NURSE PRACTITIONER

## 2025-03-17 PROCEDURE — 25000003 PHARM REV CODE 250: Performed by: STUDENT IN AN ORGANIZED HEALTH CARE EDUCATION/TRAINING PROGRAM

## 2025-03-17 PROCEDURE — 63600175 PHARM REV CODE 636 W HCPCS

## 2025-03-17 PROCEDURE — 83735 ASSAY OF MAGNESIUM: CPT | Performed by: NURSE PRACTITIONER

## 2025-03-17 PROCEDURE — 99900035 HC TECH TIME PER 15 MIN (STAT)

## 2025-03-17 PROCEDURE — 85025 COMPLETE CBC W/AUTO DIFF WBC: CPT | Performed by: STUDENT IN AN ORGANIZED HEALTH CARE EDUCATION/TRAINING PROGRAM

## 2025-03-17 RX ORDER — POLYETHYLENE GLYCOL 3350 17 G/17G
17 POWDER, FOR SOLUTION ORAL 2 TIMES DAILY
Status: DISCONTINUED | OUTPATIENT
Start: 2025-03-17 | End: 2025-03-17 | Stop reason: HOSPADM

## 2025-03-17 RX ADMIN — ASPIRIN 81 MG: 81 TABLET, COATED ORAL at 10:03

## 2025-03-17 RX ADMIN — DIPHENHYDRAMINE HYDROCHLORIDE 25 MG: 50 INJECTION, SOLUTION INTRAMUSCULAR; INTRAVENOUS at 12:03

## 2025-03-17 RX ADMIN — OXYCODONE HYDROCHLORIDE 10 MG: 10 TABLET ORAL at 12:03

## 2025-03-17 RX ADMIN — PANTOPRAZOLE SODIUM 20 MG: 20 TABLET, DELAYED RELEASE ORAL at 10:03

## 2025-03-17 RX ADMIN — POLYETHYLENE GLYCOL 3350 17 G: 17 POWDER, FOR SOLUTION ORAL at 10:03

## 2025-03-17 RX ADMIN — Medication 250 MG: at 10:03

## 2025-03-17 RX ADMIN — METOPROLOL SUCCINATE 12.5 MG: 25 TABLET, EXTENDED RELEASE ORAL at 10:03

## 2025-03-17 RX ADMIN — OXYCODONE HYDROCHLORIDE 10 MG: 10 TABLET ORAL at 08:03

## 2025-03-17 RX ADMIN — CEFAZOLIN 2 G: 2 INJECTION, POWDER, FOR SOLUTION INTRAMUSCULAR; INTRAVENOUS at 04:03

## 2025-03-17 RX ADMIN — OXYCODONE HYDROCHLORIDE 10 MG: 10 TABLET ORAL at 02:03

## 2025-03-17 RX ADMIN — BACLOFEN 10 MG: 10 TABLET ORAL at 04:03

## 2025-03-17 RX ADMIN — SENNOSIDES AND DOCUSATE SODIUM 1 TABLET: 50; 8.6 TABLET ORAL at 10:03

## 2025-03-17 RX ADMIN — PREGABALIN 150 MG: 150 CAPSULE ORAL at 10:03

## 2025-03-17 RX ADMIN — Medication 1 CAPSULE: at 10:03

## 2025-03-17 RX ADMIN — LACTULOSE 20 G: 20 SOLUTION ORAL at 10:03

## 2025-03-17 RX ADMIN — ENOXAPARIN SODIUM 40 MG: 40 INJECTION SUBCUTANEOUS at 04:03

## 2025-03-17 RX ADMIN — OXYCODONE HYDROCHLORIDE 10 MG: 10 TABLET ORAL at 04:03

## 2025-03-17 RX ADMIN — BACLOFEN 10 MG: 10 TABLET ORAL at 10:03

## 2025-03-17 RX ADMIN — ACETAMINOPHEN 1000 MG: 500 TABLET ORAL at 04:03

## 2025-03-17 RX ADMIN — ATORVASTATIN CALCIUM 20 MG: 20 TABLET, FILM COATED ORAL at 10:03

## 2025-03-17 RX ADMIN — ACETAMINOPHEN 1000 MG: 500 TABLET ORAL at 06:03

## 2025-03-17 NOTE — PROGRESS NOTES
Tyrone Stein - Neurosurgery ([facility])  [facility] Medicine  Progress Note    Patient Name: Fior Mckeon  MRN: [id number]  Patient Class: IP- Inpatient   Admission Date: 3/11/2025  Length of Stay: 6 days  Attending Physician: Sharad Coe MD  Primary Care Provider: Matias Downs MD      Subjective     Principal Problem:Spinal stenosis of lumbar region without neurogenic claudication      HPI:  Fior Mckeon is a 64 y.o. female PMH gastric bypass, CHF, CAD s/p stents x 3, has flat back deformity and lumbar stenosis with neurogenic claudication. She is now s/p T11-pelvis fusion (3/11). Hospital medicine consulted for assistance with new hyponatremia.     Back Hx: Has flat back deformity with lumbar stenosis and neurogenic claudication. Failed conservative management. S/p L4-5 MIS laminectomy with Dr. Walters 10/23/23. Previous decompression at L5-S1 at OSH. Progressively worsening since 2020. She tried L5-S1 facet injections PT, and massage therapy without relief. Now POD 4 from T11-pelvis fusion.     CAD hx: patient report 3 prior stents and that [facility]. Per chart review: cardiac stents placed in March/April 2024 and was on Plavix. [facility]. After discussion with cardiologist she will remain on aspirin 81mg through surgery.  Also, had resultant ICM and HFreF after stents in 2024 during which she reportedly had EF ~ 30%. Recent echo 3/11 with EF 55- 60%, and normal diastolic function.     Patient reported severe abdominal pain this morning related to constipation. She said she had not had a BM in about 4 days. She says that she has been eating and drinking well, about 4 large bottles a day. She denies any lightheaded, dizziness, HA, or N/V.     Overview/Hospital Course:  No notes on file    Interval History: No acute overnight events. Successful bowel movement after brown bomb yesterday afternoon. Will continue aggressive bowel regimen to  keep BMs regular despite acute pain management with opioids. Otherwise hyponatremia improved.    Review of Systems   Constitutional:  Negative for appetite change.   Cardiovascular:  Negative for chest pain.   Gastrointestinal:  Negative for abdominal pain, constipation, nausea and vomiting.   Musculoskeletal:  Positive for back pain.   Neurological:  Negative for dizziness, light-headedness and headaches.     Objective:     Vital Signs (Most Recent):  Temp: 98.8 °F (37.1 °C) (03/17/25 0732)  Pulse: 68 (03/17/25 0732)  Resp: 18 (03/17/25 0732)  BP: (!) 152/74 (03/17/25 0732)  SpO2: 98 % (03/17/25 0732) Vital Signs (24h Range):  Temp:  [98.2 °F (36.8 °C)-99.3 °F (37.4 °C)] 98.8 °F (37.1 °C)  Pulse:  [68-96] 68  Resp:  [18-20] 18  SpO2:  [93 %-98 %] 98 %  BP: (102-152)/(62-74) 152/74     Weight: 107.5 kg (236 lb 15.9 oz)  Body mass index is 36.03 kg/m².    Intake/Output Summary (Last 24 hours) at 3/17/2025 0759  Last data filed at 3/17/2025 0600  Gross per 24 hour   Intake --   Output 915 ml   Net -915 ml         Physical Exam  Constitutional:       General: She is not in acute distress.     Appearance: She is not ill-appearing or diaphoretic.   HENT:      Head: Normocephalic and atraumatic.      Right Ear: External ear normal.      Left Ear: External ear normal.      Nose: Nose normal.      Mouth/Throat:      Mouth: Mucous membranes are moist.   Eyes:      Extraocular Movements: Extraocular movements intact.   Cardiovascular:      Rate and Rhythm: Normal rate and regular rhythm.      Heart sounds: No murmur heard.  Pulmonary:      Effort: Pulmonary effort is normal. No respiratory distress.      Breath sounds: No wheezing or rhonchi.   Abdominal:      General: Abdomen is flat. There is no distension.      Tenderness: There is no abdominal tenderness.   Musculoskeletal:         General: Normal range of motion.      Cervical back: Normal range of motion.      Right lower leg: No edema.      Left lower leg: No edema.       Comments: Surgical drain in place, serosanguinous output   Skin:     General: Skin is warm.   Neurological:      General: No focal deficit present.      Mental Status: She is alert.         Significant Labs: All pertinent labs within the past 24 hours have been reviewed.    Significant Imaging: I have reviewed all pertinent imaging results/findings within the past 24 hours.      Assessment & Plan  Spinal stenosis of lumbar region without neurogenic claudication  DDD (degenerative disc disease), lumbar  S/p elective T11-pelvis posterior fusion, L4-S1 TLIF and L1-L4 SPO for deformity correction and bilateral paraspinal muscle flap closure for with NSGY and plastic surgery 3/11/25. POD 4. Reports pain well controlled currently    - continue management per NSGY primary team  - MM Pain control: tylenol 1g, baclofen scheduled, lyrica 150 daily 300 nightly scheduled. PRN oxy 5 for moderate pain and oxy 10 for severe pain.   - Home chronic pain regimen: Tramadol 50 mg q4h (60 MME's daily)  - TLSO brace per primary       Constipation  Reports hx of chronic constipation. Says that at home she takes lactulose,senna, and probiotic. Bowel movement 3/16 after brown-bomb. Will plan aggressive oral regimen to keep patient regular.    - prn bisacodyl suppository   - Continue scheduled senna-doc BID  - continue scheduled lactulose daily; can up-titrate if needed  - continue scheduled miralax BID     Hyponatremia (Resolved: 3/17/2025)  Hyponatremia is likely due to unclear etiology. The patient's most recent sodium results are listed below.  Recent Labs     03/15/25  0712 03/16/25  0810 03/17/25  0509   * 132*  135* 137     Plan  - Correct the sodium by 4-6mEq in 24 hours.   - Will follow up on urine Na, urine Osm, Serum Osm to evaluate etiology  - Monitor sodium Daily.   - Patient hyponatremia is stable  - Unclear etiology of hyponatremia, unlikely hypovolemic in setting of good fluid intake. Possibly hypervolemic d/t excess  fluid intake or medication related.    CLL (chronic lymphocytic leukemia)  Hx of CLL. Has consistently elevated leukocytosis     - Continue to monitor  Tobacco dependency  - continue nicotine patch  Acute blood loss anemia  Anemia is likely due to acute blood loss which was from T11-pelvis fusion on 3/11 . Most recent hemoglobin and hematocrit are listed below.  Recent Labs     03/15/25  0712 03/16/25  0810 03/17/25  0509   HGB 7.8* 8.2* 8.5*   HCT 24.3* 25.9* 26.7*     Plan  - Monitor serial CBC: Daily  - Transfuse PRBC if patient becomes hemodynamically unstable, symptomatic or H/H drops below 7/21.  - Patient has received 4 units of PRBCs on 3/11 and 3/14  - Patient's anemia is currently stable  - Continue to monitor  History of alcohol abuse  Reported history during COVID. Stopped cold turkey in 1047-4956 with resultant seizure. Reports only drinks only occasionally now and no recent alcohol use.     - monitor for signs/symptoms of alcohol withdrawal    HFrEF (heart failure with reduced ejection fraction)  History of CAD s/p 3 stents in 2024 with resultant ICM and HFrEF. Reports EF improved from 30% to 60%. Not overtly fluid overloaded on exam.  Echo 3/11/25 with EF 55- 60%, and normal diastolic function. Mild AV sclerosis and mild TV regurg.   GDMT originally held in setting of post- op hypotension     - Continue metoprolol 12.5mg daily   - Hold jardiance, spironolactone and valsartan and restart as tolerated  VTE Risk Mitigation (From admission, onward)           Ordered     enoxaparin injection 40 mg  Every 24 hours         03/11/25 1352     IP VTE LOW RISK PATIENT  Once         03/11/25 0514                    Discharge Planning   GILMER: 3/17/2025     Code Status: Full Code   Medical Readiness for Discharge Date:   Discharge Plan A: Skilled Nursing Facility      Thank you for your consult, medicine consults will sign off at this time. Please re-consult for further questions or change in clinical  condition.      Ladarius Newman MD  Department of Hospital Medicine   [facility] - Neurosurgery ([facility])

## 2025-03-17 NOTE — SUBJECTIVE & OBJECTIVE
Interval History: No acute overnight events. Successful bowel movement after brown bomb yesterday afternoon. Will continue aggressive bowel regimen to keep BMs regular despite acute pain management with opioids. Otherwise hyponatremia improved.    Review of Systems   Constitutional:  Negative for appetite change.   Cardiovascular:  Negative for chest pain.   Gastrointestinal:  Negative for abdominal pain, constipation, nausea and vomiting.   Musculoskeletal:  Positive for back pain.   Neurological:  Negative for dizziness, light-headedness and headaches.     Objective:     Vital Signs (Most Recent):  Temp: 98.8 °F (37.1 °C) (03/17/25 0732)  Pulse: 68 (03/17/25 0732)  Resp: 18 (03/17/25 0732)  BP: (!) 152/74 (03/17/25 0732)  SpO2: 98 % (03/17/25 0732) Vital Signs (24h Range):  Temp:  [98.2 °F (36.8 °C)-99.3 °F (37.4 °C)] 98.8 °F (37.1 °C)  Pulse:  [68-96] 68  Resp:  [18-20] 18  SpO2:  [93 %-98 %] 98 %  BP: (102-152)/(62-74) 152/74     Weight: 107.5 kg (236 lb 15.9 oz)  Body mass index is 36.03 kg/m².    Intake/Output Summary (Last 24 hours) at 3/17/2025 0759  Last data filed at 3/17/2025 0600  Gross per 24 hour   Intake --   Output 915 ml   Net -915 ml         Physical Exam  Constitutional:       General: She is not in acute distress.     Appearance: She is not ill-appearing or diaphoretic.   HENT:      Head: Normocephalic and atraumatic.      Right Ear: External ear normal.      Left Ear: External ear normal.      Nose: Nose normal.      Mouth/Throat:      Mouth: Mucous membranes are moist.   Eyes:      Extraocular Movements: Extraocular movements intact.   Cardiovascular:      Rate and Rhythm: Normal rate and regular rhythm.      Heart sounds: No murmur heard.  Pulmonary:      Effort: Pulmonary effort is normal. No respiratory distress.      Breath sounds: No wheezing or rhonchi.   Abdominal:      General: Abdomen is flat. There is no distension.      Tenderness: There is no abdominal tenderness.   Musculoskeletal:          General: Normal range of motion.      Cervical back: Normal range of motion.      Right lower leg: No edema.      Left lower leg: No edema.      Comments: Surgical drain in place, serosanguinous output   Skin:     General: Skin is warm.   Neurological:      General: No focal deficit present.      Mental Status: She is alert.         Significant Labs: All pertinent labs within the past 24 hours have been reviewed.    Significant Imaging: I have reviewed all pertinent imaging results/findings within the past 24 hours.

## 2025-03-17 NOTE — CARE UPDATE
Plastic Surgery Care Update     Patient seen and evaluated. Notified by neurosurgery PA that there was plan for discharge today pending our plan. Prevena removed at bedside. Incision is cdi. Island dressing placed.     Plan:  - OK to Dc from plastics perspective with drains in place. Will fu in 1 wk   - will need drain education prior to dc.    -bring log to clinic visit.

## 2025-03-17 NOTE — ASSESSMENT & PLAN NOTE
S/p elective T11-pelvis posterior fusion, L4-S1 TLIF and L1-L4 SPO for deformity correction and bilateral paraspinal muscle flap closure for with NSGY and plastic surgery 3/11/25. POD 4. Reports pain well controlled currently    - continue management per NSGY primary team  - MM Pain control: tylenol 1g, baclofen scheduled, lyrica 150 daily 300 nightly scheduled. PRN oxy 5 for moderate pain and oxy 10 for severe pain.   - Home chronic pain regimen: Tramadol 50 mg q4h (60 MME's daily)  - TLSO brace per primary

## 2025-03-17 NOTE — ASSESSMENT & PLAN NOTE
Anemia is likely due to acute blood loss which was from T11-pelvis fusion on 3/11. Most recent hemoglobin and hematocrit are listed below.  Recent Labs     03/15/25  0712 03/16/25  0810 03/17/25  0509   HGB 7.8* 8.2* 8.5*   HCT 24.3* 25.9* 26.7*     Plan  - Monitor serial CBC: Daily  - Transfuse PRBC if patient becomes hemodynamically unstable, symptomatic or H/H drops below 7/21.  - Patient has received 4 units of PRBCs on 3/11 and 3/14  - Patient's anemia is currently stable  - Continue to monitor

## 2025-03-17 NOTE — PLAN OF CARE
Problem: Adult Inpatient Plan of Care  Goal: Plan of Care Review  Outcome: Progressing  Goal: Readiness for Transition of Care  Outcome: Progressing     Problem: Infection  Goal: Absence of Infection Signs and Symptoms  Outcome: Progressing     Problem: Wound  Goal: Skin Health and Integrity  Outcome: Progressing     Problem: Skin Injury Risk Increased  Goal: Skin Health and Integrity  Outcome: Progressing     Problem: Fall Injury Risk  Goal: Absence of Fall and Fall-Related Injury  Outcome: Progressing     Problem: Pain Acute  Goal: Optimal Pain Control and Function  Outcome: Progressing

## 2025-03-17 NOTE — PLAN OF CARE
03/17/25 1152   Post-Acute Status   Post-Acute Authorization Home Health;HME   HME Status Referrals Sent   Home Health Status Referrals Sent   [facility]age [facility]   Hospital Resources/Appts/Education Provided Provided education on problems/symptoms using teachback   Discharge Delays None known at this time   Discharge Plan   Discharge Plan A Home Health   Discharge Plan B Home Health;Home with family     Discharge Plan A and Plan B have been determined by review of patient's clinical status, future medical and therapeutic needs, and coverage/benefits for post-acute care in coordination with multidisciplinary team members.     CM reviewed additional order for BS, faxed [facility], and sent discharge summary. [facility] sent finial discharge orders to .     12:56 PM  Recalled [facility] rep Sindi states received paperwork and discharge summary patient has auth for  and good to go from her end. Cm reviewed above with patient at bedside.     Tammy Diop RN  Case Management  [phone]

## 2025-03-17 NOTE — ASSESSMENT & PLAN NOTE
Reports hx of chronic constipation. Says that at home she takes lactulose,senna, and probiotic. Bowel movement 3/16 after brown-bomb. Will plan aggressive oral regimen to keep patient regular.    - prn bisacodyl suppository   - Continue scheduled senna-doc BID  - continue scheduled lactulose daily; can up-titrate if needed  - continue scheduled miralax BID

## 2025-03-17 NOTE — ASSESSMENT & PLAN NOTE
Hyponatremia is likely due to unclear etiology. The patient's most recent sodium results are listed below.  Recent Labs     03/15/25  0712 03/16/25  0810 03/17/25  0509   * 132*  135* 137     Plan  - Correct the sodium by 4-6mEq in 24 hours.   - Will follow up on urine Na, urine Osm, Serum Osm to evaluate etiology  - Monitor sodium Daily.   - Patient hyponatremia is stable  - Unclear etiology of hyponatremia, unlikely hypovolemic in setting of good fluid intake. Possibly hypervolemic d/t excess fluid intake or medication related.

## 2025-03-17 NOTE — PLAN OF CARE
Unable to get pt. Scheduled for her PCP appointment. Called pt. She states she will call and make her appointment.          Roxanne Amin W  Case Management   [phone]

## 2025-03-17 NOTE — DISCHARGE SUMMARY
[facility] - Neurosurgery ([facility])  Neurosurgery  Discharge Summary      Patient Name: Fior Mckeon  MRN: [id number]  Admission Date: 3/11/2025  Hospital Length of Stay: 6 days  Discharge Date and Time:  03/17/2025 10:44 AM  Attending Physician: Sharad Coe MD   Discharging Provider: Connie Epperson PA-C  Primary Care Provider: Matias Downs MD    HPI:   HPI from 10/24/2024:  Patient presents for follow-up of low back pain. S/p L4-5 MIS laminectomy with Dr. Walters 10/23/23. Previous decompression at L5-S1 at OSH. Progressively worsening since 2020. She has tried L5-S1 facet injections, PT, and massage therapy without relief. The pain is constant. Occasional neck stiffness. Denies arm pain, leg pain, b/b dysfunction, hand clumsiness. She walks flexed forward as she is unable to stand upright without pain. She reports intermittent foot numbness following surgeries in both feet in the early 2000s. She underwent cardiac stent placement 3/2024 and is on plavix now. She states her cardiologist won't clear her until 1 year post-op to undergo spine fusion. She also uses nicotine but says she will quit in order to have surgery. [facility]      Interval Hx:   Today the patient presents to clinic to discuss surgery. Denies changes in symptoms since last visit. Continues to report debilitating back pain. She cannot walk long distances or stand for long periods of time without severe pain. She's been participating in pre-hab since the last visit. She denies any bowel/bladder issues.        Procedure(s) (LRB):  *AIRO* T10-PELVIS POSTERIOR INSTRUMENTED FUSION (N/A)  CLOSURE, SURGICAL WOUND OR DEHISCENCE, EXTENSIVE OR COMPLEX, SECONDARY     Hospital Course: 3/12: POD 1.  BP controlled off of fluids and pressors. Drains with 230/145 from deep drains. Neurologically intact. Having significant low back pain. Labs appropriate  3/13: POD 2. Significant low back pain limiting  mobility.  Walked 30 feet with PT.  Will increase pain medication today and obtain XR TL Spine.  3/14: POD 3. XR obtained, deep drains with 60/160 output, superficial drain and prevena per PRS.  Walked 100 ft yesterday.  Voiding.  Pain better controlled.   3/15: POD 4. Neuro exam stable. Had 2 episodes of hypotension yesterday afternoon, Hgb 7.5, got 1U of blood, got CTA AP showed no active bleeding / hematoma, put patient on pulse ox and tele for monitoring. Repeat Hgb after transfusion was 8.5. Satting well on 2L NC due to dropping sat when sleep per nurse. BP has been stable. Drains in place with output: ANDREINA: 340cc (L), 450cc (R) and HV: 0cc (L), 10cc (R).   3/16: NAEON. H/H 8/26 this AM. Pending BM, c/o gas pain. HV and ANDREINA output downtrending. HM following, appreciate assistanc  3/17: NAEON. AFVSS. Exam stable. Pain controlled. H/H stable. Large BM yesterday. Voiding spontaneously. Plan for HV removal today. Prevena removed per plastic surgery for discharge. Keep 2 ANDREINA drains and f/u in 1 week with plastics.     Neurosurgery Physical Exam  5/5 strength BLE  Sensation present to light touch in BLE  CN intact  Incision with island border dressing c/d/i    Goals of Care Treatment Preferences:  Code Status: Full Code      Consults:   Consults (From admission, onward)          Status Ordering Provider     Inpatient consult to Social Work  Once        Provider:  (Not yet assigned)    Acknowledged VINH ALBARADO     Inpatient consult to [facility]-General  Once        Provider:  (Not yet assigned)    Completed VINH ALBARADO            Significant Diagnostic Studies: Labs: BMP:   Recent Labs   Lab 03/16/25  0810 03/17/25  0509   GLU 87  86 88   *  135* 137   K 3.8  3.8 3.9     100 101   CO2 25 25 26   BUN 8  8 9   CREATININE 0.6  0.6 0.7   CALCIUM 8.3*  8.2* 8.4*   MG 2.0 2.1   , CBC   Recent Labs   Lab 03/16/25  0810 03/17/25  0509   WBC 18.97* 20.54*   HGB 8.2* 8.5*   HCT 25.9* 26.7*   PLT  "177 207   , and All labs within the past 24 hours have been reviewed  Radiology: XR thoracolumbar, KUB, CTA abd/pelvis, CXR      Pending Diagnostic Studies:       Procedure Component Value Units Date/Time    CT Interoperative Limited [[id number]] Resulted: 03/11/25 1001    Order Status: Sent Lab Status: In process Updated: 03/11/25 1001          Final Active Diagnoses:    Diagnosis Date Noted POA    PRINCIPAL PROBLEM:  Spinal stenosis of lumbar region without neurogenic claudication [M48.061] 10/23/2023 Yes    Constipation [K59.00] 03/15/2025 No    Osteoarthritis [M19.90] 03/12/2025 Yes    Scoliosis [M41.9] 03/12/2025 Yes    Tobacco dependency [F17.200] 03/11/2025 Yes    Acute blood loss anemia [D62] 03/11/2025 Yes    HFrEF (heart failure with reduced ejection fraction) [I50.20] 03/11/2025 Yes    History of alcohol abuse [F10.11] 03/11/2025 Yes    CLL (chronic lymphocytic leukemia) [C91.10] 10/23/2023 Yes     Chronic    DDD (degenerative disc disease), lumbar [M51.369] 10/23/2023 Yes      Problems Resolved During this Admission:    Diagnosis Date Noted Date Resolved POA    Hyponatremia [E87.1] 03/15/2025 03/17/2025 Unknown    Postoperative hypotension [I95.81] 03/11/2025 03/16/2025 Yes      Discharged Condition: good     Disposition: Home-Health Care Svc    Follow Up: 1 week plastic surgery, 6 weeks neurosurgery    Patient Instructions:      WALKER FOR HOME USE     Order Specific Question Answer Comments   Type of Walker: Adult (5'4"-6'6")    With wheels? Yes    Height: 5' 8" (1.727 m)    Weight: 107.5 kg (236 lb 15.9 oz)    Length of need (1-99 months): 99    Does patient have medical equipment at home? crutches, forearm    Please check all that apply: Patient is unable to safely ambulate without equipment.      Ambulatory referral/consult to Home Health   Standing Status: Future   Referral Priority: Routine Referral Type: Home Health   Referral Reason: Specialty Services Required   Requested Specialty: Home Health " Services   Number of Visits Requested: 1     Notify your health care provider if you experience any of the following:  temperature >100.4     Notify your health care provider if you experience any of the following:  persistent nausea and vomiting or diarrhea     Notify your health care provider if you experience any of the following:  severe uncontrolled pain     Notify your health care provider if you experience any of the following:  redness, tenderness, or signs of infection (pain, swelling, redness, odor or green/yellow discharge around incision site)     Notify your health care provider if you experience any of the following:  difficulty breathing or increased cough     Notify your health care provider if you experience any of the following:  severe persistent headache     Notify your health care provider if you experience any of the following:  worsening rash     Notify your health care provider if you experience any of the following:  persistent dizziness, light-headedness, or visual disturbances     Notify your health care provider if you experience any of the following:  increased confusion or weakness     Reason for not Prescribing Nicotine Replacement     Order Specific Question Answer Comments   Reason for not Prescribing: Not medically appropriate at this time no current tobacco use     Reason for not Ordering Smoking Cessation Referral     Order Specific Question Answer Comments   Reason for not ordering: Not medically appropriate at this time no current tobacco use     Activity as tolerated     Medications:  Reconciled Home Medications:      Medication List        START taking these medications      diazePAM 5 MG tablet  Commonly known as: VALIUM  Take 1 tablet (5 mg total) by mouth every 8 (eight) hours as needed (muscle spasms).     oxyCODONE 10 mg Tab immediate release tablet  Commonly known as: ROXICODONE  Take 1 tablet (10 mg total) by mouth every 4 (four) hours as needed for Pain.      polyethylene glycol 17 gram Pwpk  Commonly known as: GLYCOLAX  Take 17 g by mouth once daily.            CHANGE how you take these medications      aspirin 81 MG EC tablet  Commonly known as: ECOTRIN  Take 81 mg by mouth once. On hold  What changed: additional instructions            CONTINUE taking these medications      acetaminophen 500 MG tablet  Commonly known as: TYLENOL  Take 2 tablets (1,000 mg total) by mouth every 8 (eight) hours.     apple cider vinegar 250 mg Chew  Take by mouth once daily.     atorvastatin 20 mg/5 mL (4 mg/mL) Susp  Take 20 mg by mouth once daily. On hold until after surgery     * baclofen 20 MG tablet  Commonly known as: LIORESAL  10 mg. TAKES 10 MG     * carboxymethylcellulose 0.5 % Dpet  Commonly known as: REFRESH PLUS  1 drop 3 (three) times daily as needed.     * carboxymethylcellulose 0.5 % Dpet  Commonly known as: REFRESH PLUS  1 drop 3 (three) times daily as needed.     clotrimazole 1 % cream  Commonly known as: LOTRIMIN  APPLY MODERATE AMOUNT TOPICALLY TWICE A DAY FOR FUNGAL INFECTION     ergocalciferol (vitamin D2) 10 mcg (400 unit) Tab  Take 400 Units by mouth once a week.     ferrous gluconate 324 MG tablet  Commonly known as: FERGON  Take 324 mg by mouth daily with breakfast.     furosemide 40 MG tablet  Commonly known as: LASIX  20 mg as needed.     JARDIANCE ORAL     Lactobacillus rhamnosus GG 10 billion cell capsule  Commonly known as: CULTURELLE  Take 1 capsule by mouth once daily.     levocetirizine 5 MG tablet  Commonly known as: XYZAL  Take 5 mg by mouth every evening.     magnesium oxide 400 mg (241.3 mg magnesium) tablet  Commonly known as: MAG-OX  Take 400 mg by mouth once daily.     metoprolol succinate 25 MG 24 hr tablet  Commonly known as: TOPROL-XL  TAKE 12.5 MG (ONE-HALF TABLET) BY MOUTH EVERY DAY FOR CHRONIC HEART FAILURE     pantoprazole 20 MG tablet  Commonly known as: PROTONIX  Take 20 mg by mouth once daily.     pregabalin 150 MG capsule  Commonly  known as: LYRICA  Take 150 mg by mouth 2 (two) times daily.     senna 8.6 mg tablet  Commonly known as: SENOKOT  Take 8.6 mg by mouth.     spironolactone 25 MG tablet  Commonly known as: ALDACTONE  Take 25 mg by mouth.     triamcinolone acetonide 0.1% 0.1 % cream  Commonly known as: KENALOG  Apply topically 2 (two) times daily. prn     valsartan 40 MG tablet  Commonly known as: DIOVAN  40 mg once daily.     VITAMIN C 250 MG tablet  Generic drug: ascorbic acid (vitamin C)  Take 250 mg by mouth once daily.     zinc sulfate 50 mg zinc (220 mg) capsule  Commonly known as: ZINCATE  Take 1 capsule by mouth once daily.           * This list has 3 medication(s) that are the same as other medications prescribed for you. Read the directions carefully, and ask your doctor or other care provider to review them with you.                STOP taking these medications      traMADoL 50 mg tablet  Commonly known as: ULTRAM            ASK your doctor about these medications      * baclofen 10 MG tablet  Commonly known as: LIORESAL  Take 10 mg by mouth 3 (three) times daily.           * This list has 1 medication(s) that are the same as other medications prescribed for you. Read the directions carefully, and ask your doctor or other care provider to review them with you.                  Connie Epperson PA-C  Neurosurgery  [facility] - Neurosurgery [facility])

## 2025-03-17 NOTE — ASSESSMENT & PLAN NOTE
Reported history during COVID. Stopped cold turkey in 8664-1089 with resultant seizure. Reports only drinks only occasionally now and no recent alcohol use.     - monitor for signs/symptoms of alcohol withdrawal

## 2025-03-18 ENCOUNTER — TELEPHONE (OUTPATIENT)
Dept: NEUROSURGERY | Facility: CLINIC | Age: 65
End: 2025-03-18
Payer: OTHER GOVERNMENT

## 2025-03-18 NOTE — TELEPHONE ENCOUNTER
----- Message from Ashkan sent at 3/18/2025 11:04 AM CDT -----  Regarding: Reschedule Appointment  Contact: [phone]  Calling to reschedule appointment to 03/26/25 so she can make 1 trip. Please contact patient as soon as possible.

## 2025-03-18 NOTE — TELEPHONE ENCOUNTER
Pc to pt. Discussed with dylan. Pt. Needs to see plastic surgery for post op wound care.  My appt was cancelled.pt.verbalized understanding

## 2025-03-19 NOTE — OP NOTE
DATE OF SURGERY: 3/11/25     PREOPERATIVE DIAGNOSIS:  1. Flat back deformity and neurogenic claudication  2. Hx of L4-5 and L5-S1 laminectomy, CAD s/p stents and Plavix, prior craniotomy  3. Class II obesity     POSTOPERATIVE DIAGNOSIS:  Same.     PROCEDURE PERFORMED:  1. Posterior spinal fusion, T11 to pelvis   2. Posterior segmental spinal fixation, T11 to S1 (DePuy Synthes)  3. Pelvic fixation with S2AI screws (Depuy Synthes)  4. Posterior lumbar interbody fusion, L4-5 and L5-S1  5.  Application of titanium interbody spacer, L4-5 and L5-S1 (DePuy Synthes)  6.  Maddie osteotomy, L1-L4  7.  Use of intraoperative neuro-monitoring with MEPs  8. Use of intraoperative CT neuronavigation  9. Infuse and local bone grafting     SURGEON: Sharad Coe M.D.    COSURGEON: Abhinav Chawla M.D., Plastic Surgery     ASSISTANT: Bennett Collins M.D.     ANESTHESIA: GETA     ESTIMATED BLOOD LOSS: 1500mL     COMPLICATIONS: None     DRAINS: Two deep Hemovacs, two superficial JPs     SPECIMENS SENT: None     FINDINGS: None     INDICATIONS:     64F with multiple major medical comorbidities presents with debilitating axial low back pain, stooped posture and neurogenic claudication in the setting of the above diagnoses, which failed conservative measures. I recommended the above procedure. R/B/A/I/M were reviewed in detail and the patient wished to proceed. All questions were answered and no guarantees were made.  .  PROCEDURE:     The patient was brought into the operating room where she was intubated and placed under general anesthesia without difficulty.  All lines were placed. She was repositioned prone onto the operating room table with appropriate padding all pressure points. The region of interest was localized with AP and lateral x-ray.  The skin was marked and the area was prepped and draped in the usual sterile fashion.  A timeout was performed prior to procedure.  20 mL's of lidocaine with epinephrine were injected in the  skin.     A midline linear incision was made with a 10 blade from approximately T11 to sacrum.  Supra and subfascial midline dissection was carried out with Bovie electrocautery.  Subperiosteal dissection was carried out with Bovie electrocautery and Grossman elevators to expose the posterior elements from T11 to sacrum.  Medial facetectomies were performed bilaterally at T11-12 through L5-S1 with the osteotome.       The CT neuro navigation array was docked onto the right PSIS throught a separate stab incision and a CT spin was acquired.  The intraoperative CT confirmed levels..  Using neuro-navigation pedicle screws were placed bilaterally from T11 to L5. S1 screws were left out as they would have abutted the planned S2 screws.  All lumbar screws were stimulated with the neuromonitoring probe and found to stimulate within safe range. Bilateral pelvic fixation was achieved with S2AI screws, using navigation. Spinal xrays confirmed good hardware position. We augmented bilateral T11 and T12 screws with cement under flouroscopy, and we confirmed no significant cement extravasation.     Attention was turned to performance of a posterior lumbar interbody fusion at L5-S1 from a right-sided approach. First an L5-S1 laminectomy was performed to access the thecal sac, nerve roots and disc space. It was performed in standard fashion with the high speed drill and rongeurs. Adequate decompression of the thecal sac and nerve roots were confirmed with Bergen palpation.  Next, a nerve root retractor was used to retract the thecal sac medially and expose the L5-S1 disc space.  Epidural veins were cauterized and divided.  An annulotomy was performed with a 15 blade.  A pituitary rongeur was used to remove disc material.  The endplates were prepared with disc dionicio, rasps, and curettes. A titanium cage was countersunk into the L5-S1 disc space and had snug fit. The L5-S1 disc space was pre and post packed with local bone for  interbody arthrodesis. Xrays showed good cage position.     An L4-5 Maddie osteotomy was performed in standard fashion to reduce her spinal deformity using the osteotome. Then an L4-5 TLIF was performed in identical fashion as above. Xrays showed good cage position at each level.    L1, L2 and L3 Maddie osteotomies were performed in standard fashion as above to reduce her flat back deformity.    Bilateral titanium rods were sized, contoured and reduced into the tulip heads.  Set screws were tightened. Sequential compression was performed bilaterally across the L1-L4 Maddie osteotomies to promote lumbar lordosis. Final xrays showed excellent reduction of the deformity and excellent position of all hardware.  The wound was copiously irrigated with sterile normal saline and a dilute Betadine solution. Exposed bony surfaces from T11 to sacrum were decorticated bilaterally with a high-speed drill.  Infuse and local bone were placed posteriorly for arthrodesis from T11 to sacrum. Two deep Hemovacs were placed. We then turned the wound over to Plastic Surgery for complex wound revision. Please see their notes for details.     The patient appeared to tolerate the procedure well from a hemodynamic and neuromonitoring standpoint. MEPs were present and stable in all extremities throughout the case. I was present for all critical portions of the case, and at the end of the case all counts were correct. The patient was repositioned supine onto the hospital bed where she was extubated and allowed to emerge from anesthesia. She was sent to the PACU in stable condition for recovery.    JUSTIFICATION OF MODIFIER 22: This was a complex spinal deformity correction surgery due to her multiple major medical comorbidities listed above. We contended with higher than normal EBL due to her Plavix coagulopathy despite a maximal multimodal approach to hemostasis. Inherent risks and overall surgical time were significantly elevated in this case  compared to similar procedures.

## 2025-03-26 ENCOUNTER — OFFICE VISIT (OUTPATIENT)
Dept: PLASTIC SURGERY | Facility: CLINIC | Age: 65
End: 2025-03-26
Payer: OTHER GOVERNMENT

## 2025-03-26 ENCOUNTER — PATIENT MESSAGE (OUTPATIENT)
Dept: NEUROSURGERY | Facility: CLINIC | Age: 65
End: 2025-03-26
Payer: OTHER GOVERNMENT

## 2025-03-26 DIAGNOSIS — M41.9 SCOLIOSIS, UNSPECIFIED SCOLIOSIS TYPE, UNSPECIFIED SPINAL REGION: ICD-10-CM

## 2025-03-26 DIAGNOSIS — Z98.1 S/P SPINAL FUSION: Primary | ICD-10-CM

## 2025-03-26 DIAGNOSIS — Z09 SURGERY FOLLOW-UP EXAMINATION: Primary | ICD-10-CM

## 2025-03-26 PROCEDURE — 99999 PR PBB SHADOW E&M-EST. PATIENT-LVL II: CPT | Mod: PBBFAC,,, | Performed by: SURGERY

## 2025-03-26 PROCEDURE — 99024 POSTOP FOLLOW-UP VISIT: CPT | Mod: ,,, | Performed by: SURGERY

## 2025-03-26 PROCEDURE — 99212 OFFICE O/P EST SF 10 MIN: CPT | Mod: PBBFAC | Performed by: SURGERY

## 2025-03-26 RX ORDER — OXYCODONE HYDROCHLORIDE 5 MG/1
5 TABLET ORAL EVERY 4 HOURS PRN
Qty: 42 TABLET | Refills: 0 | Status: SHIPPED | OUTPATIENT
Start: 2025-03-26 | End: 2025-04-02

## 2025-03-26 NOTE — PROGRESS NOTES
Plastic Surgery Clinic Postop Visit  Fior Mckeon is a 64 y.o. year old female who presents to the Plastic Surgery Clinic for follow up visit status post bilateral paraspinous muscle flap closure of spinal wound on 3/11/2025 by Dr. Brandon Chawla. Right drain output < 30 ccs, left drain output 40-50 ccs.   Denies fever, chills, nausea, vomiting, or other systemic signs of infection.    REVIEW OF SYSTEMS:   Negative unless otherwise stated above in HPI    PHYSICAL EXAM:  There were no vitals filed for this visit.  WD WN NAD  VSS  Normal resp effort  Spine: incision CDI. No erythema or drainage. No signs of infection. No fluid collection palpated. Drains with SS output.     ASSESSMENT/PLAN:  64 y.o. female status post spinal closure  - Pt was seen and evaluated by myself and Dr. Brandon Chawla.  - Right drain removed today, no complications. Pt tolerated well. Left drain to remain. Pt to continue monitoring output.  - Return to clinic in 2 weeks or sooner if drain output low. Staff to schedule.    All questions were answered. The patient was advised to call the clinic with any questions or concerns prior to their next visit.     Felicita Marks PA-C  Plastic and Reconstructive Surgery  [phone]

## 2025-04-01 ENCOUNTER — TELEPHONE (OUTPATIENT)
Dept: NEUROSURGERY | Facility: CLINIC | Age: 65
End: 2025-04-01
Payer: OTHER GOVERNMENT

## 2025-04-01 DIAGNOSIS — Z98.1 S/P SPINAL FUSION: ICD-10-CM

## 2025-04-01 DIAGNOSIS — M41.9 SCOLIOSIS, UNSPECIFIED SCOLIOSIS TYPE, UNSPECIFIED SPINAL REGION: ICD-10-CM

## 2025-04-01 RX ORDER — OXYCODONE HYDROCHLORIDE 5 MG/1
5 TABLET ORAL EVERY 4 HOURS PRN
Qty: 42 TABLET | Refills: 0 | Status: SHIPPED | OUTPATIENT
Start: 2025-04-01 | End: 2025-04-08

## 2025-04-01 NOTE — TELEPHONE ENCOUNTER
Called patient back and was informed that she is taking her pain meds again. Patient requested refill and she is due for one on 04/02/25, informed her that we can refill the previous prescription and it will be ready either tomorrow or Thursday.  Patient voiced understanding and thanked me.

## 2025-04-02 ENCOUNTER — TELEPHONE (OUTPATIENT)
Dept: NEUROSURGERY | Facility: CLINIC | Age: 65
End: 2025-04-02
Payer: OTHER GOVERNMENT

## 2025-04-02 ENCOUNTER — PATIENT MESSAGE (OUTPATIENT)
Dept: PLASTIC SURGERY | Facility: CLINIC | Age: 65
End: 2025-04-02
Payer: OTHER GOVERNMENT

## 2025-04-02 ENCOUNTER — PATIENT MESSAGE (OUTPATIENT)
Dept: NEUROSURGERY | Facility: CLINIC | Age: 65
End: 2025-04-02
Payer: OTHER GOVERNMENT

## 2025-04-02 DIAGNOSIS — Z98.1 S/P SPINAL FUSION: Primary | ICD-10-CM

## 2025-04-02 NOTE — TELEPHONE ENCOUNTER
P/c to pt, explained that she will need to f/u with plastic surgery dept. Also sent them a message regarding reaching out to pt. (Pt asked me to) also, instructed pt. To upload pic in portal. She did and attached to a coversation that we had, I forwarded the pics to plastic surgery dept.

## 2025-04-02 NOTE — TELEPHONE ENCOUNTER
----- Message from Anne Marie Agee NP sent at 4/2/2025  2:04 PM CDT -----  Regarding: FW: Urgent Callback  Contact: [phone]    ----- Message -----  From: Ashkan Modi  Sent: 4/2/2025   1:59 PM CDT  To: Saravanan Orourke Staff; Anuel BARBER Staff  Subject: Urgent Callback                                  Patient calling requesting a callback from Massimo in regards to jennifer looking infected. She said she have pictures for the provider.  Please call back as soon as possible.

## 2025-04-03 ENCOUNTER — TELEPHONE (OUTPATIENT)
Dept: NEUROSURGERY | Facility: CLINIC | Age: 65
End: 2025-04-03
Payer: OTHER GOVERNMENT

## 2025-04-03 NOTE — TELEPHONE ENCOUNTER
L m on pt. V/m that dr. Chawla office trying to reach her as she hasn't read portal message from them. L./m asking pt to contact their office.

## 2025-04-04 RX ORDER — CEPHALEXIN 500 MG/1
500 CAPSULE ORAL EVERY 6 HOURS
Qty: 28 CAPSULE | Refills: 0 | Status: SHIPPED | OUTPATIENT
Start: 2025-04-04 | End: 2025-04-11

## 2025-04-07 ENCOUNTER — OFFICE VISIT (OUTPATIENT)
Dept: PLASTIC SURGERY | Facility: CLINIC | Age: 65
End: 2025-04-07
Payer: OTHER GOVERNMENT

## 2025-04-07 VITALS — BODY MASS INDEX: 35.92 KG/M2 | HEIGHT: 68 IN | WEIGHT: 237 LBS

## 2025-04-07 DIAGNOSIS — M41.9 SCOLIOSIS, UNSPECIFIED SCOLIOSIS TYPE, UNSPECIFIED SPINAL REGION: ICD-10-CM

## 2025-04-07 DIAGNOSIS — Z98.1 S/P SPINAL FUSION: ICD-10-CM

## 2025-04-07 DIAGNOSIS — Z09 SURGERY FOLLOW-UP EXAMINATION: Primary | ICD-10-CM

## 2025-04-07 PROCEDURE — 99024 POSTOP FOLLOW-UP VISIT: CPT | Mod: ,,, | Performed by: SURGERY

## 2025-04-07 PROCEDURE — 99214 OFFICE O/P EST MOD 30 MIN: CPT | Mod: PBBFAC | Performed by: SURGERY

## 2025-04-07 PROCEDURE — 99999 PR PBB SHADOW E&M-EST. PATIENT-LVL IV: CPT | Mod: PBBFAC,,, | Performed by: SURGERY

## 2025-04-07 RX ORDER — OXYCODONE HYDROCHLORIDE 5 MG/1
5 TABLET ORAL EVERY 4 HOURS PRN
Qty: 42 TABLET | Refills: 0 | Status: SHIPPED | OUTPATIENT
Start: 2025-04-07 | End: 2025-04-10 | Stop reason: SDUPTHER

## 2025-04-07 NOTE — PROGRESS NOTES
Patient presents to the Plastic surgery Clinic status post paraspinous muscle flap closure of spinal wound.  She has done very very well in the midline.  There was a small 2 cm incision used for instrumentation on the right lateral side.  This was closed using staples.  She had some erythema there which she did send photos.  I looked at this today.  There was some superficial exudate.  The staples were removed.  This does not tunnel or track anywhere.  It is in fact only about 0.5 cm deep.  Patient is currently on Keflex.  There was no cellulitis whatsoever.  We will see her back next week.

## 2025-04-10 ENCOUNTER — PATIENT MESSAGE (OUTPATIENT)
Dept: NEUROSURGERY | Facility: CLINIC | Age: 65
End: 2025-04-10
Payer: OTHER GOVERNMENT

## 2025-04-10 DIAGNOSIS — N64.9 DISORDER OF BREAST, UNSPECIFIED: Primary | ICD-10-CM

## 2025-04-10 DIAGNOSIS — M41.9 SCOLIOSIS, UNSPECIFIED SCOLIOSIS TYPE, UNSPECIFIED SPINAL REGION: ICD-10-CM

## 2025-04-10 DIAGNOSIS — Z98.1 S/P SPINAL FUSION: ICD-10-CM

## 2025-04-10 RX ORDER — OXYCODONE HYDROCHLORIDE 5 MG/1
5 TABLET ORAL EVERY 4 HOURS PRN
Qty: 42 TABLET | Refills: 0 | Status: SHIPPED | OUTPATIENT
Start: 2025-04-13 | End: 2025-04-20

## 2025-04-16 ENCOUNTER — OFFICE VISIT (OUTPATIENT)
Dept: PLASTIC SURGERY | Facility: CLINIC | Age: 65
End: 2025-04-16
Payer: OTHER GOVERNMENT

## 2025-04-16 DIAGNOSIS — Z09 SURGERY FOLLOW-UP EXAMINATION: Primary | ICD-10-CM

## 2025-04-16 PROCEDURE — 99024 POSTOP FOLLOW-UP VISIT: CPT | Mod: ,,, | Performed by: SURGERY

## 2025-04-16 PROCEDURE — 99211 OFF/OP EST MAY X REQ PHY/QHP: CPT | Mod: PBBFAC | Performed by: SURGERY

## 2025-04-16 PROCEDURE — 99999 PR PBB SHADOW E&M-EST. PATIENT-LVL I: CPT | Mod: PBBFAC,,, | Performed by: SURGERY

## 2025-04-16 NOTE — PROGRESS NOTES
Patient presents Plastic surgery Clinic status post bilateral paraspinous muscle flap closure of spinal wound.  She has done well the incisions well healed drains were removed.  She did have a small opening where the instrumentations lateral to the central incision was performed.  This is clean and healing well there was no problems here no cellulitis no sign of infection.  See the patient back in 6 weeks.

## 2025-04-17 RX ORDER — OXYCODONE HYDROCHLORIDE 5 MG/1
5 TABLET ORAL EVERY 4 HOURS PRN
Qty: 42 TABLET | Refills: 0 | Status: SHIPPED | OUTPATIENT
Start: 2025-04-19

## 2025-04-24 ENCOUNTER — OFFICE VISIT (OUTPATIENT)
Dept: NEUROSURGERY | Facility: CLINIC | Age: 65
End: 2025-04-24
Payer: OTHER GOVERNMENT

## 2025-04-24 ENCOUNTER — HOSPITAL ENCOUNTER (OUTPATIENT)
Dept: RADIOLOGY | Facility: HOSPITAL | Age: 65
Discharge: HOME | End: 2025-04-24
Attending: NEUROLOGICAL SURGERY
Payer: OTHER GOVERNMENT

## 2025-04-24 ENCOUNTER — PATIENT MESSAGE (OUTPATIENT)
Dept: NEUROSURGERY | Facility: CLINIC | Age: 65
End: 2025-04-24

## 2025-04-24 VITALS — HEART RATE: 60 BPM | SYSTOLIC BLOOD PRESSURE: 136 MMHG | DIASTOLIC BLOOD PRESSURE: 81 MMHG

## 2025-04-24 DIAGNOSIS — M43.27 FUSION OF SPINE, LUMBOSACRAL REGION: Primary | ICD-10-CM

## 2025-04-24 DIAGNOSIS — Z98.1 ARTHRODESIS STATUS: ICD-10-CM

## 2025-04-24 DIAGNOSIS — Z98.1 S/P SPINAL FUSION: ICD-10-CM

## 2025-04-24 PROCEDURE — 99999 PR PBB SHADOW E&M-EST. PATIENT-LVL II: CPT | Mod: PBBFAC,,, | Performed by: PHYSICIAN ASSISTANT

## 2025-04-24 PROCEDURE — 72082 X-RAY EXAM ENTIRE SPI 2/3 VW: CPT | Mod: 26,,, | Performed by: RADIOLOGY

## 2025-04-24 PROCEDURE — 99212 OFFICE O/P EST SF 10 MIN: CPT | Mod: PBBFAC,25 | Performed by: PHYSICIAN ASSISTANT

## 2025-04-24 PROCEDURE — 99024 POSTOP FOLLOW-UP VISIT: CPT | Mod: ,,, | Performed by: PHYSICIAN ASSISTANT

## 2025-04-24 PROCEDURE — 72082 X-RAY EXAM ENTIRE SPI 2/3 VW: CPT | Mod: TC

## 2025-04-24 NOTE — PROGRESS NOTES
[facility] - Neurosurgery 8th Fl  Neurosurgery    SUBJECTIVE:     History of Present Illness:  Fior Mckeon is a 64 y.o. female s/p T11-pelvis fusion, L4-S1 PLIF, L1-4 joseph osteotomies 3/17/25 with Dr. Coe who presents for 6 week postop evaluation. Compliant with brace. Endorses left buttock pain but states her preop back pain has significantly improved. She feels that she is standing more upright. She endorses incisional pain. Denies b/b dysfunction, leg pain. She has decreased her opioid use to q6h prn. She has delayed wound healing of the right stab incision over PSIS. She is being followed by plastic surgery for this. Denies fever, chills, drainage, tenderness.       Review of patient's allergies indicates:   Allergen Reactions    Adhesive tape-silicones      Tegaderm ok per Pt. Pt reports high allergy to paper tape as well    Codeine      Has to take benadryl with      Flunisolide      Unsure of reaction    Gabapentin Other (See Comments)     [place] drowsy    Hydrocodone Itching     Can take with benadryl    Oxycodone Itching     Can take with benadryl        Past Medical History:   Diagnosis Date    Arthritis     Bulging lumbar disc     Cancer     Osteoarthritis        Past Surgical History:   Procedure Laterality Date    ABDOMINOPLASTY      APPENDECTOMY      BACK SURGERY      breast aug Bilateral     CHOLECYSTECTOMY      CLOSURE, SURGICAL WOUND OR DEHISCENCE, EXTENSIVE OR COMPLEX, SECONDARY  3/11/2025    Procedure: CLOSURE, SURGICAL WOUND OR DEHISCENCE, EXTENSIVE OR COMPLEX, SECONDARY;  Surgeon: Brandon Chawla MD;  Location: [facility] OR [address] [place] [address];  Service: Plastics;;    FOOT SURGERY      GANGLION CYST EXCISION      GASTRIC BYPASS      HIP SURGERY      HYSTERECTOMY      KNEE SURGERY      LUMBAR FUSION N/A 3/11/2025    Procedure: *AIRO* T10-PELVIS POSTERIOR INSTRUMENTED FUSION;  Surgeon: Sharad Coe MD;  Location: [facility] OR [address] [place] [address];  Service: Neurosurgery;  Laterality: N/A;  T-10 TO PELVIS  POSTERIOR INSTRUMENTED FUSION    LUMBAR LAMINECTOMY N/A 10/23/2023    Procedure: MINIMALLY INVASIVE LAMINECTOMY, SPINE, LUMBAR L4-5;  Surgeon: Adam Walters MD;  Location: [facility] OR [address] [place] [address];  Service: Neurosurgery;  Laterality: N/A;    SHOULDER SURGERY      TOTAL REDUCTION MAMMOPLASTY          Family History   Problem Relation Name Age of Onset    Hypertension Mother      Hypertension Father      Diabetes Father      Cancer Father         Social History[1]    Review of Systems:  Per HPI    OBJECTIVE:     Vital Signs (Most Recent):  Vitals:    04/24/25 1445   BP: 136/81   Pulse: 60       Physical Exam:  General: well developed, well nourished, no distress.   Head: normocephalic, atraumatic  Neurologic: Alert and oriented. Thought content appropriate.  GCS: Motor: 6/Verbal: 5/Eyes: 4 GCS Total: 15  Mental Status: Awake, Alert, Oriented x 4  Language: No aphasia  Speech: No dysarthria  Cranial nerves: face symmetric, tongue midline, CN II-XII grossly intact.   Eyes: pupils equal, round, reactive to light with accomodation, EOMI.  Pulmonary: normal respirations, no signs of respiratory distress  Abdomen: soft, non-distended, not tender to palpation  Sensory: intact to light touch throughout    Motor Strength: Moves all extremities spontaneously with good tone.  Full strength upper and lower extremities. No abnormal movements seen.     Strength  Deltoids Triceps Biceps Wrist Extension Wrist Flexion Hand    Upper: R 5/5 5/5 5/5 5/5 5/5 5/5    L 5/5 5/5 5/5 5/5 5/5 5/5     Iliopsoas Quadriceps Knee  Flexion Tibialis  anterior Gastro- cnemius EHL   Lower: R 5/5 5/5 5/5 5/5 5/5 5/5    L 5/5 5/5 5/5 5/5 5/5 5/5     Skin: Skin is warm, dry and intact.  Gait: stable with use of canes    T/L incision well healed. Right PSIS stab incision with superficial dehiscence, no tunneling. No fluctuance, induration, drainage.         Diagnostic Results:  I have personally reviewed all pertinent imaging.    XR scoliosis 4/24/25:  -  satisfactory placement of hardware    ASSESSMENT/PLAN:     Fior Mckeon is a 64 y.o. female s/p T11-pelvis fusion, L4-S1 PLIF, L1-4 joseph osteotomies 3/17/25 with Dr. Coe who presents for 6 week postop evaluation. Neuro exam stable. Continue wound care per plastic surgery for right PSIS incision. F/u 6 weeks with CT T/L to assess for bony fusion. We discussed concerning signs and symptoms that would prompt return to clinic or urgent medical attention, patient v/u. All questions answered. Encouraged to call the clinic with questions/concerns prior to the next visit.      Connie Epperson PA-C  Neurosurgery      Note dictated with voice recognition software, please excuse any grammatical errors.             [1]   Social History  Socioeconomic History    Marital status:    Tobacco Use    Smoking status: Every Day     Current packs/day: 0.50     Average packs/day: 0.5 packs/day for 2.0 years (1.0 ttl pk-yrs)     Types: Vaping with nicotine, Cigarettes, Vaping w/o nicotine     Start date: 4/1/2023     Last attempt to quit: 2/11/2025    Smokeless tobacco: Never   Substance and Sexual Activity    Alcohol use: Yes    Drug use: Never     Social Drivers of Health     Financial Resource Strain: Low Risk  (3/12/2025)    Overall Financial Resource Strain (CARDIA)     Difficulty of Paying Living Expenses: Not hard at all   Food Insecurity: No Food Insecurity (3/12/2025)    Hunger Vital Sign     Worried About Running Out of Food in the Last Year: Never true     Ran Out of Food in the Last Year: Never true   Transportation Needs: Unmet Transportation Needs (3/11/2025)    PRAPARE - Transportation     Lack of Transportation (Medical): No     Lack of Transportation (Non-Medical): Yes   Physical Activity: Inactive (3/12/2025)    Exercise Vital Sign     Days of Exercise per Week: 0 days     Minutes of Exercise per Session: 0 min   Stress: No Stress Concern Present (3/12/2025)    [employer] - Occupational Stress Questionnaire     Feeling of Stress : Not at all   Housing Stability: Low Risk  (3/12/2025)    Housing Stability Vital Sign     Unable to Pay for Housing in the Last Year: No     Number of Times Moved in the Last Year: 0     Homeless in the Last Year: No

## 2025-05-01 DIAGNOSIS — Z98.1 S/P SPINAL FUSION: ICD-10-CM

## 2025-05-01 RX ORDER — OXYCODONE HYDROCHLORIDE 5 MG/1
5 TABLET ORAL EVERY 8 HOURS PRN
Qty: 28 TABLET | Refills: 0 | Status: SHIPPED | OUTPATIENT
Start: 2025-05-01

## 2025-05-14 ENCOUNTER — OFFICE VISIT (OUTPATIENT)
Dept: PLASTIC SURGERY | Facility: CLINIC | Age: 65
End: 2025-05-14
Payer: OTHER GOVERNMENT

## 2025-05-14 DIAGNOSIS — Z09 SURGERY FOLLOW-UP EXAMINATION: Primary | ICD-10-CM

## 2025-05-14 PROCEDURE — 99999 PR PBB SHADOW E&M-EST. PATIENT-LVL II: CPT | Mod: PBBFAC,,,

## 2025-05-14 PROCEDURE — 99024 POSTOP FOLLOW-UP VISIT: CPT | Mod: ,,,

## 2025-05-14 PROCEDURE — 99212 OFFICE O/P EST SF 10 MIN: CPT | Mod: PBBFAC

## 2025-05-14 NOTE — PROGRESS NOTES
Plastic Surgery Clinic Postop Visit  Fior Mckeon is a 64 y.o. year old female who presents to the Plastic Surgery Clinic for follow up visit status post bilateral paraspinous muscle flap closure on 3/11/2025 by Dr. Brandon Chawla. Pt reports she has some drainage her incision. Otherwise she states she is very pleased  Denies fever, chills, nausea, vomiting, or other systemic signs of infection.    REVIEW OF SYSTEMS:   Negative unless otherwise stated above in HPI    PHYSICAL EXAM:  There were no vitals filed for this visit.  WD WN NAD  VSS  Normal resp effort  Spine: some drainage from the inferior incision. Fluid collection palpated.     ASSESSMENT/PLAN:  64 y.o. female status post spine closure  - Pt was seen and evaluated by myself and Dr. Brandon Chawla.  - Removed roughly 150 ccs. Pt tolerated well. Pt was instructed to have some compression along her incision along with an abdominal binder. She is in agreement.   - Return to clinic in 2 weeks. Staff to schedule.    All questions were answered. The patient was advised to call the clinic with any questions or concerns prior to their next visit.     Felicita Marks  Plastic and Reconstructive Surgery  [phone]

## 2025-05-16 ENCOUNTER — PATIENT MESSAGE (OUTPATIENT)
Dept: PLASTIC SURGERY | Facility: CLINIC | Age: 65
End: 2025-05-16
Payer: OTHER GOVERNMENT

## 2025-05-28 ENCOUNTER — OFFICE VISIT (OUTPATIENT)
Dept: PLASTIC SURGERY | Facility: CLINIC | Age: 65
End: 2025-05-28
Payer: OTHER GOVERNMENT

## 2025-05-28 VITALS
DIASTOLIC BLOOD PRESSURE: 81 MMHG | WEIGHT: 237 LBS | BODY MASS INDEX: 35.92 KG/M2 | HEART RATE: 60 BPM | SYSTOLIC BLOOD PRESSURE: 136 MMHG | HEIGHT: 68 IN

## 2025-05-28 DIAGNOSIS — Z09 SURGERY FOLLOW-UP EXAMINATION: Primary | ICD-10-CM

## 2025-05-28 PROCEDURE — 99999 PR PBB SHADOW E&M-EST. PATIENT-LVL III: CPT | Mod: PBBFAC,,,

## 2025-05-28 PROCEDURE — 99213 OFFICE O/P EST LOW 20 MIN: CPT | Mod: PBBFAC

## 2025-05-28 PROCEDURE — 99024 POSTOP FOLLOW-UP VISIT: CPT | Mod: ,,,

## 2025-05-28 NOTE — PROGRESS NOTES
Plastic Surgery Clinic Postop Visit  Fior Mckeon is a 64 y.o. year old female who presents to the Plastic Surgery Clinic for follow up visit status post bilateral paraspinous muscle flap closure on 3/11/2025 by Dr. Brandon Chawla. Pt reports she has some drainage her incision. Otherwise she states she is very pleased  Denies fever, chills, nausea, vomiting, or other systemic signs of infection.    Interval History 5/28/2025:  Pt reports she has been using compression and a binder.     REVIEW OF SYSTEMS:   Negative unless otherwise stated above in HPI    PHYSICAL EXAM:  Vitals:    05/28/25 1049   BP: 136/81   Pulse: 60     WD WN NAD  VSS  Normal resp effort  Spine: some drainage from the inferior incision. Fluid collection palpated.    ASSESSMENT/PLAN:  64 y.o. female status post spine closure  - Pt was seen and evaluated by myself and Dr. Brandon Chawla.  - Removed roughly 80 ccs of SS fluid. Pt tolerated well. Pt was instructed to have some compression along her incision along with an abdominal binder. She is in agreement.   - Staff to coordinate IR drain placement. Pt will need a drain placed next week.   - Return to clinic in 2 weeks. Staff to schedule.    All questions were answered. The patient was advised to call the clinic with any questions or concerns prior to their next visit.     Felicita Marks  Plastic and Reconstructive Surgery  [phone]

## 2025-05-29 DIAGNOSIS — Z09 SURGERY FOLLOW-UP EXAMINATION: Primary | ICD-10-CM

## 2025-05-30 ENCOUNTER — TELEPHONE (OUTPATIENT)
Dept: INTERVENTIONAL RADIOLOGY/VASCULAR | Facility: HOSPITAL | Age: 65
End: 2025-05-30
Payer: OTHER GOVERNMENT

## 2025-05-30 DIAGNOSIS — Z09 SURGERY FOLLOW-UP EXAMINATION: Primary | ICD-10-CM

## 2025-06-02 ENCOUNTER — HOSPITAL ENCOUNTER (OUTPATIENT)
Dept: INTERVENTIONAL RADIOLOGY/VASCULAR | Facility: HOSPITAL | Age: 65
Discharge: HOME | End: 2025-06-02
Attending: SURGERY
Payer: OTHER GOVERNMENT

## 2025-06-02 VITALS
DIASTOLIC BLOOD PRESSURE: 58 MMHG | OXYGEN SATURATION: 100 % | RESPIRATION RATE: 22 BRPM | BODY MASS INDEX: 35.77 KG/M2 | SYSTOLIC BLOOD PRESSURE: 111 MMHG | WEIGHT: 236 LBS | HEART RATE: 80 BPM | HEIGHT: 68 IN

## 2025-06-02 DIAGNOSIS — Z09 SURGERY FOLLOW-UP EXAMINATION: ICD-10-CM

## 2025-06-02 PROCEDURE — 99152 MOD SED SAME PHYS/QHP 5/>YRS: CPT

## 2025-06-02 PROCEDURE — 99153 MOD SED SAME PHYS/QHP EA: CPT

## 2025-06-02 PROCEDURE — C1769 GUIDE WIRE: HCPCS

## 2025-06-02 PROCEDURE — 10030 IMG GID FLU COLL DRG SFT TIS: CPT | Performed by: STUDENT IN AN ORGANIZED HEALTH CARE EDUCATION/TRAINING PROGRAM

## 2025-06-02 PROCEDURE — 99152 MOD SED SAME PHYS/QHP 5/>YRS: CPT | Mod: ,,, | Performed by: STUDENT IN AN ORGANIZED HEALTH CARE EDUCATION/TRAINING PROGRAM

## 2025-06-02 PROCEDURE — 99153 MOD SED SAME PHYS/QHP EA: CPT | Performed by: STUDENT IN AN ORGANIZED HEALTH CARE EDUCATION/TRAINING PROGRAM

## 2025-06-02 PROCEDURE — 99152 MOD SED SAME PHYS/QHP 5/>YRS: CPT | Performed by: STUDENT IN AN ORGANIZED HEALTH CARE EDUCATION/TRAINING PROGRAM

## 2025-06-02 PROCEDURE — C1729 CATH, DRAINAGE: HCPCS

## 2025-06-02 PROCEDURE — 63600175 PHARM REV CODE 636 W HCPCS: Performed by: STUDENT IN AN ORGANIZED HEALTH CARE EDUCATION/TRAINING PROGRAM

## 2025-06-02 RX ORDER — MIDAZOLAM HYDROCHLORIDE 1 MG/ML
INJECTION, SOLUTION INTRAMUSCULAR; INTRAVENOUS
Status: COMPLETED | OUTPATIENT
Start: 2025-06-02 | End: 2025-06-02

## 2025-06-02 RX ORDER — FENTANYL CITRATE 50 UG/ML
INJECTION, SOLUTION INTRAMUSCULAR; INTRAVENOUS
Status: COMPLETED | OUTPATIENT
Start: 2025-06-02 | End: 2025-06-02

## 2025-06-02 RX ORDER — LIDOCAINE HYDROCHLORIDE 10 MG/ML
INJECTION, SOLUTION INFILTRATION; PERINEURAL
Status: COMPLETED | OUTPATIENT
Start: 2025-06-02 | End: 2025-06-02

## 2025-06-02 RX ADMIN — FENTANYL CITRATE 50 MCG: 50 INJECTION INTRAMUSCULAR; INTRAVENOUS at 01:06

## 2025-06-02 RX ADMIN — LIDOCAINE HYDROCHLORIDE 5 ML: 10 INJECTION, SOLUTION INFILTRATION; PERINEURAL at 01:06

## 2025-06-02 RX ADMIN — MIDAZOLAM 2 MG: 1 INJECTION INTRAMUSCULAR; INTRAVENOUS at 01:06

## 2025-06-05 ENCOUNTER — OFFICE VISIT (OUTPATIENT)
Dept: NEUROSURGERY | Facility: CLINIC | Age: 65
End: 2025-06-05
Payer: OTHER GOVERNMENT

## 2025-06-05 VITALS — DIASTOLIC BLOOD PRESSURE: 67 MMHG | HEART RATE: 80 BPM | SYSTOLIC BLOOD PRESSURE: 108 MMHG

## 2025-06-05 DIAGNOSIS — M41.9 SCOLIOSIS, UNSPECIFIED SCOLIOSIS TYPE, UNSPECIFIED SPINAL REGION: ICD-10-CM

## 2025-06-05 DIAGNOSIS — Z98.1 S/P SPINAL FUSION: Primary | ICD-10-CM

## 2025-06-05 DIAGNOSIS — M43.27 FUSION OF SPINE, LUMBOSACRAL REGION: ICD-10-CM

## 2025-06-05 PROCEDURE — 99024 POSTOP FOLLOW-UP VISIT: CPT | Mod: ,,, | Performed by: PHYSICIAN ASSISTANT

## 2025-06-05 PROCEDURE — 99999 PR PBB SHADOW E&M-EST. PATIENT-LVL II: CPT | Mod: PBBFAC,,, | Performed by: PHYSICIAN ASSISTANT

## 2025-06-05 PROCEDURE — 99212 OFFICE O/P EST SF 10 MIN: CPT | Mod: PBBFAC | Performed by: PHYSICIAN ASSISTANT

## 2025-06-06 ENCOUNTER — TELEPHONE (OUTPATIENT)
Dept: INTERVENTIONAL RADIOLOGY/VASCULAR | Facility: HOSPITAL | Age: 65
End: 2025-06-06
Payer: OTHER GOVERNMENT

## 2025-06-09 ENCOUNTER — TELEPHONE (OUTPATIENT)
Dept: INTERVENTIONAL RADIOLOGY/VASCULAR | Facility: HOSPITAL | Age: 65
End: 2025-06-09
Payer: OTHER GOVERNMENT

## 2025-06-09 NOTE — NURSING
Received call in IR clinic from Pt. S/P Abscess drain placement on 6/2/25 at [facility],  Pt reports drain only works when standing, when pinned up doesn't drain.  Yesterday drained 200 cc when standing stop draining when pinned up and lying or sitting down.  She also reported when pinned up it loses suction.  Denies fever, pain or any other symptoms. Contacted IR providers with patients concerns per In- basket message.

## 2025-06-10 ENCOUNTER — HOSPITAL ENCOUNTER (OUTPATIENT)
Dept: INTERVENTIONAL RADIOLOGY/VASCULAR | Facility: HOSPITAL | Age: 65
Discharge: HOME | End: 2025-06-10
Attending: PHYSICIAN ASSISTANT
Payer: OTHER GOVERNMENT

## 2025-06-10 VITALS
SYSTOLIC BLOOD PRESSURE: 103 MMHG | TEMPERATURE: 98 F | RESPIRATION RATE: 20 BRPM | HEIGHT: 68 IN | OXYGEN SATURATION: 94 % | DIASTOLIC BLOOD PRESSURE: 64 MMHG | WEIGHT: 236 LBS | HEART RATE: 65 BPM | BODY MASS INDEX: 35.77 KG/M2

## 2025-06-10 DIAGNOSIS — M48.061 SPINAL STENOSIS OF LUMBAR REGION WITHOUT NEUROGENIC CLAUDICATION: ICD-10-CM

## 2025-06-10 DIAGNOSIS — M48.061 SPINAL STENOSIS OF LUMBAR REGION WITHOUT NEUROGENIC CLAUDICATION: Primary | ICD-10-CM

## 2025-06-10 PROCEDURE — C1729 CATH, DRAINAGE: HCPCS

## 2025-06-10 PROCEDURE — C1769 GUIDE WIRE: HCPCS

## 2025-06-10 PROCEDURE — 63600175 PHARM REV CODE 636 W HCPCS: Performed by: RADIOLOGY

## 2025-06-10 RX ORDER — CEFTRIAXONE 1 G/1
1 INJECTION, POWDER, FOR SOLUTION INTRAMUSCULAR; INTRAVENOUS ONCE
Status: COMPLETED | OUTPATIENT
Start: 2025-06-10 | End: 2025-06-10

## 2025-06-10 RX ORDER — LIDOCAINE HYDROCHLORIDE 10 MG/ML
INJECTION, SOLUTION INFILTRATION; PERINEURAL
Status: COMPLETED | OUTPATIENT
Start: 2025-06-10 | End: 2025-06-10

## 2025-06-10 RX ADMIN — LIDOCAINE HYDROCHLORIDE 5 ML: 10 INJECTION, SOLUTION INFILTRATION; PERINEURAL at 02:06

## 2025-06-10 RX ADMIN — CEFTRIAXONE SODIUM 1 G: 1 INJECTION, POWDER, FOR SOLUTION INTRAMUSCULAR; INTRAVENOUS at 01:06

## 2025-06-10 NOTE — H&P
Radiology History & Physical      SUBJECTIVE:     Chief Complaint: back seroma    History of Present Illness:  Fior Mckeon is a 64 y.o. female who presents s/p left back seroma drain placement with positional drainage and pericatheter leakage.     Past Medical History:   Diagnosis Date    Arthritis     Bulging lumbar disc     Cancer     Osteoarthritis      Past Surgical History:   Procedure Laterality Date    ABDOMINOPLASTY      APPENDECTOMY      BACK SURGERY      breast aug Bilateral     CHOLECYSTECTOMY      CLOSURE, SURGICAL WOUND OR DEHISCENCE, EXTENSIVE OR COMPLEX, SECONDARY  3/11/2025    Procedure: CLOSURE, SURGICAL WOUND OR DEHISCENCE, EXTENSIVE OR COMPLEX, SECONDARY;  Surgeon: Brandon Chawla MD;  Location: [facility] OR [address] [place] [address];  Service: Plastics;;    FOOT SURGERY      GANGLION CYST EXCISION      GASTRIC BYPASS      HIP SURGERY      HYSTERECTOMY      KNEE SURGERY      LUMBAR FUSION N/A 3/11/2025    Procedure: *AIRO* T10-PELVIS POSTERIOR INSTRUMENTED FUSION;  Surgeon: Sharad Coe MD;  Location: [facility] OR [address] [place] [address];  Service: Neurosurgery;  Laterality: N/A;  T-10 TO PELVIS POSTERIOR INSTRUMENTED FUSION    LUMBAR LAMINECTOMY N/A 10/23/2023    Procedure: MINIMALLY INVASIVE LAMINECTOMY, SPINE, LUMBAR L4-5;  Surgeon: Adam Walters MD;  Location: [facility] OR [address] [place] [address];  Service: Neurosurgery;  Laterality: N/A;    SHOULDER SURGERY      TOTAL REDUCTION MAMMOPLASTY         Home Meds:   Prior to Admission medications    Medication Sig Start Date End Date Taking? Authorizing Provider   acetaminophen (TYLENOL) 500 MG tablet Take 2 tablets (1,000 mg total) by mouth every 8 (eight) hours. 10/24/23   Maryanne Ford PA-C   apple cider vinegar 250 mg Chew Take by mouth once daily.    Provider, Historical   ascorbic acid, vitamin C, (VITAMIN C) 250 MG tablet Take 250 mg by mouth once daily.    Provider, Historical   aspirin (ECOTRIN) 81 MG EC tablet Take 81 mg by mouth once. On hold 10/18/24   Provider,  Historical   atorvastatin 20 mg/5 mL (4 mg/mL) Susp Take 20 mg by mouth once daily. On hold until after surgery 3/27/24   Provider, Historical   baclofen (LIORESAL) 10 MG tablet Take 10 mg by mouth 3 (three) times daily.    Provider, Historical   baclofen (LIORESAL) 20 MG tablet 10 mg. TAKES 10 MG 1/16/24   Provider, Historical   carboxymethylcellulose (REFRESH PLUS) 0.5 % Dpet 1 drop 3 (three) times daily as needed.    Provider, Historical   carboxymethylcellulose (REFRESH PLUS) 0.5 % Dpet 1 drop 3 (three) times daily as needed.    Provider, Historical   clotrimazole (LOTRIMIN) 1 % cream APPLY MODERATE AMOUNT TOPICALLY TWICE A DAY FOR FUNGAL INFECTION 11/8/23   Provider, Historical   diazePAM (VALIUM) 5 MG tablet Take 1 tablet (5 mg total) by mouth every 8 (eight) hours as needed (muscle spasms). 3/14/25 3/27/25  Connie Eppreson PA-C   empagliflozin (JARDIANCE ORAL)  7/15/24   Provider, Historical   ergocalciferol, vitamin D2, 10 mcg (400 unit) Tab Take 400 Units by mouth once a week.    Provider, Historical   ferrous gluconate (FERGON) 324 MG tablet Take 324 mg by mouth daily with breakfast.    Provider, Historical   furosemide (LASIX) 40 MG tablet 20 mg as needed. 3/15/24   Provider, Historical   Lactobacillus rhamnosus GG (CULTURELLE) 10 billion cell capsule Take 1 capsule by mouth once daily.    Provider, Historical   levocetirizine (XYZAL) 5 MG tablet Take 5 mg by mouth every evening.    Provider, Historical   magnesium oxide (MAG-OX) 400 mg (241.3 mg magnesium) tablet Take 400 mg by mouth once daily.    Provider, Historical   metoprolol succinate (TOPROL-XL) 25 MG 24 hr tablet TAKE 12.5 MG (ONE-HALF TABLET) BY MOUTH EVERY DAY FOR CHRONIC HEART FAILURE 3/15/24   Provider, Historical   oxyCODONE (ROXICODONE) 5 MG immediate release tablet Take 1 tablet (5 mg total) by mouth every 8 (eight) hours as needed for Pain. 5/1/25   Anne Marie Agee, NP   pantoprazole (PROTONIX) 20 MG tablet Take 20 mg by mouth once  daily.    Provider, Historical   polyethylene glycol (GLYCOLAX) 17 gram PwPk Take 17 g by mouth once daily. 3/15/25   Connie Epperson PA-C   pregabalin (LYRICA) 150 MG capsule Take 150 mg by mouth 2 (two) times daily.    Provider, Historical   senna (SENOKOT) 8.6 mg tablet Take 8.6 mg by mouth. 9/9/24   Provider, Historical   spironolactone (ALDACTONE) 25 MG tablet Take 25 mg by mouth. 3/22/24   Provider, Historical   triamcinolone acetonide 0.1% (KENALOG) 0.1 % cream Apply topically 2 (two) times daily. prn 4/15/24   Provider, Historical   valsartan (DIOVAN) 40 MG tablet 40 mg once daily. 4/18/24   Provider, Historical   zinc sulfate (ZINCATE) 50 mg zinc (220 mg) capsule Take 1 capsule by mouth once daily. 5/19/23   Provider, Historical     Anticoagulants/Antiplatelets: no anticoagulation    Allergies:   Review of patient's allergies indicates:   Allergen Reactions    Adhesive tape-silicones      Tegaderm ok per Pt. Pt reports high allergy to paper tape as well    Codeine      Has to take benadryl with      Flunisolide      Unsure of reaction    Gabapentin Other (See Comments)     [place] drowsy    Hydrocodone Itching     Can take with benadryl    Oxycodone Itching     Can take with benadryl              OBJECTIVE:     Vital Signs (Most Recent)  Temp: 97.7 °F (36.5 °C) (06/10/25 1358)  Pulse: 65 (06/10/25 1416)  Resp: 20 (06/10/25 1416)  BP: 103/64 (06/10/25 1416)  SpO2: (!) 94 % (06/10/25 1416)    Physical Exam:  General: no acute distress  Mental Status: alert and oriented to person, place and time  HEENT: normocephalic, atraumatic  Chest: unlabored breathing  Heart: regular heart rate  Abdomen: nondistended  Extremity: moves all extremities    Laboratory  Lab Results   Component Value Date    INR 0.9 03/14/2025       Lab Results   Component Value Date    WBC 20.54 (H) 03/17/2025    HGB 8.5 (L) 03/17/2025    HCT 26.7 (L) 03/17/2025    MCV 93 03/17/2025     03/17/2025      Lab Results   Component Value Date     GLU 88 03/17/2025     03/17/2025    K 3.9 03/17/2025     03/17/2025    CO2 26 03/17/2025    BUN 9 03/17/2025    CREATININE 0.7 03/17/2025    CALCIUM 8.4 (L) 03/17/2025    MG 2.1 03/17/2025    ALT 11 03/13/2025    AST 19 03/13/2025    ALBUMIN 2.8 (L) 03/13/2025    BILITOT 0.5 03/13/2025       ASSESSMENT/PLAN:     Bedside US shows a moderate residual fluid collection. This was discussed with Dr. Chawla and the request was made to upsize the drain, which will be done under local anesthesia.     Ralph Wills MD  Interventional Radiology  Department of Radiology

## 2025-06-10 NOTE — SEDATION DOCUMENTATION
Pt arrived to suite for drain exchange. Pt oriented to unit and staff, Pt safely transferred from stretcher to procedural table. Fall risk reviewed and comfort measures utilized with interventions. Safety strap applied, position pillows to minimize pressure points. Blankets applied. Pt prepped and draped utilizing standard sterile technique. Patient placed on continuous monitoring. Timeouts implemented utilizing standard universal time-out per department and facility policy. RN to remain at bedside with continuous monitoring. Pt resting comfortably. Denies pain/discomfort.

## 2025-06-10 NOTE — DISCHARGE INSTRUCTIONS
Drain Care Instructions    Follow-up Care:  Follow-up care is an important part of your treatment and safety. Be sure to make and go to all follow-up appointments after your tube has been placed. You should expect to have follow-up care from both the provider who ordered the tube and Interventional Radiology.      Call your doctor if you are having problems.    Empty the drainage bag or bulb as needed. Make sure the stopcock is in the OFF position when emptying the bag/bulb. Once the bag is empty, turn the stopcock back on and reattach the bag.     Diet and Medication:  1. Unless specified on your personalized discharge instructions, continue previous medications and/or diet recommendations.   2. For questions about this please contact your primary care provider or the provider who ordered your drain.     Activity:   Avoid any activity that may result in pulling on the drain.    Do not submerge the drain. This means do not swim, sit in a hot tub, soak in a tub bath, or do anything that involves putting the drain under water.     Showering:  You may shower 24 hours after your drainage tube has been placed. If a dressing is used, change the dressing after your shower.     Drainage Tube Care   Change your dressing every 1 - 2 days or after taking a shower.   1. Wash your hands.   2. Remove the old bandage carefully. Try not to pull on the drain or stitches.   3. If the skin needs to be cleaned, use a gauze or cotton swab to gently clean it with soap and water.   4. Wait for your skin to dry.   5. Apply a sterile 4x4 gauze over the tube where it enters your body. Be careful not to kink the drain.   6. Secure with paper tape.   7. Wash your hands.     If you have any of the following symptoms, call the Interventional Radiology Clinic at [phone], 8:00 am - 5:00 pm, Monday through Friday:   Increased pain where the tube is inserted    Increased or more short of breath    Continuous vomiting    Tube stops draining  or the drainage drops to less than 10 mL per day for 3 days    Back or abdominal pain - this may indicate an infection    Fever (greater than 100.6 degrees F) or chills    Foul smelling drainage or abnormal bleeding from the tube site     Troubleshooting:  If the tube falls out   1. Cover the hole in the skin with gauze pads and tape.   2. Call the Interventional Radiology Clinic for instructions on how to get the tube replaced.   Interventional Radiology Clinic   For complications   [phone]. Monday - Friday, 8:00 am - 4:00 pm    [phone] After hours and on holidays. Ask to speak with the interventional radiologist on call.         Flushing drains:  If you are told to flush your drain You will need to flush your drain with 10 cc of saline each day.   1. Gather supplies: 10 mL of sterile normal saline solution, syringe, sterile gauze, paper tape, and rubbing alcohol (liquid or single-use alcohol wipes).   2. Wash your hands.   3. Turn stopcock to OFF.   4. Unscrew the drainage bag/bulb from drain (place a towel beneath to catch any drips).   5. Wipe drain with alcohol.   6. Fill the syringe with 10 mL of normal saline solution.   7. Attach syringe to drain.   8. Gently inject normal saline into drain. Do NOT pull back on the syringe.   9. Unscrew syringe.   10. Reattach drainage bag/bulb.   11. Turn stopcock to ON.   12. Wash your hands and dispose of supplies.     Call 911 if you have any of the following signs and symptoms, or if you think you need emergency care.    Shortness of breath    Chest pain    Passing out, fainting (loss of consciousness)    For Scheduling   [phone] Monday - Friday, 8:00 am - 4:00 pm

## 2025-06-10 NOTE — PROCEDURES
Radiology Post-Procedure Note    Pre Op Diagnosis: Seroma  Post Op Diagnosis: Same    Procedure: Seroma drain evaluation and upsizing    Procedure performed by: Ralph Wills MD    Written Informed Consent Obtained: Yes  Specimen Removed: NO  Estimated Blood Loss: Minimal    Findings:   Contrast injection into existing drain demonstrates a moderate persistent seroma cavity. The drain was removed over a wire and a new 16 Fr drain was placed without complication.     Patient tolerated procedure well.    Ralph Wills MD  Interventional Radiology  Department of Radiology

## 2025-06-11 ENCOUNTER — OFFICE VISIT (OUTPATIENT)
Dept: PLASTIC SURGERY | Facility: CLINIC | Age: 65
End: 2025-06-11
Payer: OTHER GOVERNMENT

## 2025-06-11 DIAGNOSIS — Z09 SURGERY FOLLOW-UP EXAMINATION: Primary | ICD-10-CM

## 2025-06-11 PROCEDURE — 99999 PR PBB SHADOW E&M-EST. PATIENT-LVL II: CPT | Mod: PBBFAC,,,

## 2025-06-11 PROCEDURE — 99212 OFFICE O/P EST SF 10 MIN: CPT | Mod: PBBFAC

## 2025-06-11 PROCEDURE — 99024 POSTOP FOLLOW-UP VISIT: CPT | Mod: ,,,

## 2025-06-11 RX ORDER — CEPHALEXIN 500 MG/1
500 CAPSULE ORAL EVERY 12 HOURS
Qty: 14 CAPSULE | Refills: 0 | Status: SHIPPED | OUTPATIENT
Start: 2025-06-11 | End: 2025-06-18

## 2025-06-11 NOTE — PROGRESS NOTES
Plastic Surgery Clinic Postop Visit  Fior Mckeon is a 64 y.o. year old female who presents to the Plastic Surgery Clinic for follow up visit status post bilateral paraspinous muscle flap closure on 3/11/2025 by Dr. Brandon Chawla. Pt reports she has some drainage her incision. Otherwise she states she is very pleased  Denies fever, chills, nausea, vomiting, or other systemic signs of infection.    Interval History 5/28/2025:  Pt reports she has been using compression and a binder.     Interval History 6/11/2025:  Pt recently saw IR who placed a drain. She reports her drain output is anywhere between  ccs.     REVIEW OF SYSTEMS:   Negative unless otherwise stated above in HPI    PHYSICAL EXAM:  There were no vitals filed for this visit.    WD WN NAD  VSS  Normal resp effort  Spine: incision seems to be intact. Drain in place w/ SS output.     ASSESSMENT/PLAN:  64 y.o. female status post spine closure  - Pt was seen and evaluated by myself and Dr. Brandon Chawla.  - Pt will continue to record her output. She will message us next week with her drain output.     All questions were answered. The patient was advised to call the clinic with any questions or concerns prior to their next visit.     Felicita Marks  Plastic and Reconstructive Surgery  [phone]

## 2025-06-12 ENCOUNTER — HOSPITAL ENCOUNTER (OUTPATIENT)
Dept: RADIOLOGY | Facility: OTHER | Age: 65
Discharge: HOME | End: 2025-06-12
Attending: FAMILY MEDICINE
Payer: OTHER GOVERNMENT

## 2025-06-12 DIAGNOSIS — N64.9 DISORDER OF BREAST, UNSPECIFIED: ICD-10-CM

## 2025-06-12 PROCEDURE — 77066 DX MAMMO INCL CAD BI: CPT | Mod: 26,,, | Performed by: RADIOLOGY

## 2025-06-12 PROCEDURE — 77062 BREAST TOMOSYNTHESIS BI: CPT | Mod: 26,,, | Performed by: RADIOLOGY

## 2025-06-12 PROCEDURE — 77066 DX MAMMO INCL CAD BI: CPT | Mod: TC

## 2025-06-17 ENCOUNTER — PATIENT MESSAGE (OUTPATIENT)
Dept: PLASTIC SURGERY | Facility: CLINIC | Age: 65
End: 2025-06-17
Payer: OTHER GOVERNMENT

## 2025-06-18 ENCOUNTER — OFFICE VISIT (OUTPATIENT)
Dept: PLASTIC SURGERY | Facility: CLINIC | Age: 65
End: 2025-06-18
Payer: OTHER GOVERNMENT

## 2025-06-18 VITALS
DIASTOLIC BLOOD PRESSURE: 64 MMHG | WEIGHT: 235.88 LBS | SYSTOLIC BLOOD PRESSURE: 103 MMHG | HEIGHT: 68 IN | BODY MASS INDEX: 35.75 KG/M2 | HEART RATE: 65 BPM

## 2025-06-18 DIAGNOSIS — Z09 SURGERY FOLLOW-UP EXAMINATION: Primary | ICD-10-CM

## 2025-06-18 PROCEDURE — 99213 OFFICE O/P EST LOW 20 MIN: CPT | Mod: PBBFAC | Performed by: SURGERY

## 2025-06-18 PROCEDURE — 99999 PR PBB SHADOW E&M-EST. PATIENT-LVL III: CPT | Mod: PBBFAC,,, | Performed by: SURGERY

## 2025-06-18 PROCEDURE — 99024 POSTOP FOLLOW-UP VISIT: CPT | Mod: ,,, | Performed by: SURGERY

## 2025-06-18 NOTE — PROGRESS NOTES
Patient presents Plastic surgery Clinic after having a bilateral paraspinous muscle flap closure of the spine.  She developed a chronic seroma.  This is her 2nd drain by Interventional Radiology.  This was a large drain.  She now states that 2 days ago put out 25 cc yesterday put out 10 cc and minimal today.  We went ahead and removed the drain.  We will go ahead and see her back in 2 weeks.  The incision itself is completely healed

## 2025-07-22 ENCOUNTER — PATIENT MESSAGE (OUTPATIENT)
Dept: PLASTIC SURGERY | Facility: CLINIC | Age: 65
End: 2025-07-22
Payer: OTHER GOVERNMENT

## (undated) DEVICE — SUT VICRYL PLUS 3-0 SH 18IN

## (undated) DEVICE — PENCIL ROCKER SWITCH 10FT CORD

## (undated) DEVICE — SEALER AQUAMANTYS 2.3 BIPOLAR

## (undated) DEVICE — DRESSING AQUACEL FOAM 5 X 5

## (undated) DEVICE — DRAPE C-ARM ELAS CLIP 42X120IN

## (undated) DEVICE — TRAY NEURO OMC

## (undated) DEVICE — SUT VICRYL PLUS 2-0 CT1 18

## (undated) DEVICE — SUT VICRYL PLUS 0 CT1 18IN

## (undated) DEVICE — KIT SURGIFLO HEMOSTATIC MATRIX

## (undated) DEVICE — TUBING NEPTUNE 2 SMOKE 10IN

## (undated) DEVICE — SUT 0 VICRYL / UR6 (J603)

## (undated) DEVICE — NDL SPINAL 18GX3.5 SPINOCAN

## (undated) DEVICE — DRAPE TOP 53X102IN

## (undated) DEVICE — SYR IRRIGATION BULB STER 60ML

## (undated) DEVICE — TRAY CATH 1-LYR URIMTR 16FR

## (undated) DEVICE — MARKER SKIN RULER STERILE

## (undated) DEVICE — CLIP MED TICALL

## (undated) DEVICE — ADHESIVE DERMABOND ADVANCED

## (undated) DEVICE — DRESSING AQUACEL FOAM 3 X 3

## (undated) DEVICE — TAP 8MM SPINAL POWER

## (undated) DEVICE — DRAPE STERI-DRAPE 1000 17X11IN

## (undated) DEVICE — SPONGE LAP 4X18 PREWASHED

## (undated) DEVICE — TAP 6MM SPINAL POWER

## (undated) DEVICE — TUBE FRAZIER 5MM 2FT SOFT TIP

## (undated) DEVICE — BLADE SURG CARBON STEEL SZ11

## (undated) DEVICE — SPHERE MARKER REFLECTIVE DISP

## (undated) DEVICE — BLADE 4IN EDGE INSULATED

## (undated) DEVICE — SPONGE NEURO 1/4X1/4

## (undated) DEVICE — DIFFUSER

## (undated) DEVICE — DRAPE ABDOMINAL TIBURON 14X11

## (undated) DEVICE — SUT SILK 3-0 BLK BR SH 30IN

## (undated) DEVICE — SYR 10CC LUER LOCK

## (undated) DEVICE — DRAPE STERI INSTRUMENT 1018

## (undated) DEVICE — TOWEL OR DISP STRL BLUE 4/PK

## (undated) DEVICE — DRESSING AQUACEL SACRAL 9 X 9

## (undated) DEVICE — KIT SPINAL PATIENT CARE JACK

## (undated) DEVICE — CORD BIPOLAR 12 FOOT

## (undated) DEVICE — ELECTRODE MEGADYNE RETURN DUAL

## (undated) DEVICE — CLIPPER BLADE MOD 4406 (CAREF)

## (undated) DEVICE — SUT STRATAFIX PDS PLUS VIO

## (undated) DEVICE — SUT CTD VICRYL 2-0 CR/CT-2

## (undated) DEVICE — KIT EVACUATOR 3-SPRING 1/8 DRN

## (undated) DEVICE — SUT VICRYL+ 1 CT1 18IN

## (undated) DEVICE — DRESSING MEPILEX BORDER 4 X 4

## (undated) DEVICE — APPLICATOR CHLORAPREP ORN 26ML

## (undated) DEVICE — CARTRIDGE OIL

## (undated) DEVICE — SEALANT ADHERUS AUTOSPRY DURAL

## (undated) DEVICE — TIP YANKAUERS BULB NO VENT

## (undated) DEVICE — DRAPE C-ARMOR EQUIPMENT COVER

## (undated) DEVICE — Device

## (undated) DEVICE — DRESSING AQUACEL FOAM 4 X 12

## (undated) DEVICE — SPONGE GAUZE 16PLY 4X4

## (undated) DEVICE — COVER BACK TBL HD 2-TIER 72IN

## (undated) DEVICE — SCREW SCHANZ SD SS 4X175MM
Type: IMPLANTABLE DEVICE | Site: SPINE LUMBAR | Status: NON-FUNCTIONAL
Removed: 2025-03-11

## (undated) DEVICE — SPONGE COTTON TRAY 4X4IN

## (undated) DEVICE — CANNULA EXPEDIUM OPN 16GX160MM

## (undated) DEVICE — BUR BONE CUT MICRO TPS 3X3.8MM

## (undated) DEVICE — ELECTRODE REM PLYHSV RETURN 9

## (undated) DEVICE — SCREW BONE SPINAL 5.5 6 X 40MM
Type: IMPLANTABLE DEVICE | Site: SPINE LUMBAR | Status: NON-FUNCTIONAL
Removed: 2025-03-11

## (undated) DEVICE — CAUTERY BOVIE PENCIL